# Patient Record
Sex: FEMALE | Race: WHITE | NOT HISPANIC OR LATINO
[De-identification: names, ages, dates, MRNs, and addresses within clinical notes are randomized per-mention and may not be internally consistent; named-entity substitution may affect disease eponyms.]

---

## 2017-07-20 ENCOUNTER — APPOINTMENT (OUTPATIENT)
Dept: BARIATRICS | Facility: CLINIC | Age: 62
End: 2017-07-20

## 2017-07-20 VITALS
DIASTOLIC BLOOD PRESSURE: 86 MMHG | SYSTOLIC BLOOD PRESSURE: 132 MMHG | WEIGHT: 198 LBS | HEIGHT: 64 IN | BODY MASS INDEX: 33.8 KG/M2

## 2017-07-20 RX ORDER — BUSPIRONE HYDROCHLORIDE 10 MG/1
10 TABLET ORAL
Qty: 60 | Refills: 0 | Status: COMPLETED | COMMUNITY
Start: 2017-04-12

## 2018-02-22 ENCOUNTER — OUTPATIENT (OUTPATIENT)
Dept: OUTPATIENT SERVICES | Facility: HOSPITAL | Age: 63
LOS: 1 days | End: 2018-02-22
Payer: COMMERCIAL

## 2018-02-22 ENCOUNTER — TRANSCRIPTION ENCOUNTER (OUTPATIENT)
Age: 63
End: 2018-02-22

## 2018-02-22 ENCOUNTER — APPOINTMENT (OUTPATIENT)
Dept: BARIATRICS | Facility: CLINIC | Age: 63
End: 2018-02-22
Payer: COMMERCIAL

## 2018-02-22 VITALS
BODY MASS INDEX: 33.46 KG/M2 | DIASTOLIC BLOOD PRESSURE: 100 MMHG | HEIGHT: 64 IN | OXYGEN SATURATION: 98 % | SYSTOLIC BLOOD PRESSURE: 150 MMHG | WEIGHT: 195.99 LBS | HEART RATE: 74 BPM

## 2018-02-22 DIAGNOSIS — E66.9 OBESITY, UNSPECIFIED: ICD-10-CM

## 2018-02-22 DIAGNOSIS — Z98.84 BARIATRIC SURGERY STATUS: ICD-10-CM

## 2018-02-22 PROCEDURE — 74220 X-RAY XM ESOPHAGUS 1CNTRST: CPT

## 2018-02-22 PROCEDURE — 74220 X-RAY XM ESOPHAGUS 1CNTRST: CPT | Mod: 26

## 2018-02-22 PROCEDURE — 99213 OFFICE O/P EST LOW 20 MIN: CPT

## 2018-02-22 RX ORDER — PAROXETINE HYDROCHLORIDE 20 MG/1
20 TABLET, FILM COATED ORAL
Qty: 30 | Refills: 0 | Status: COMPLETED | COMMUNITY
Start: 2017-01-31 | End: 2018-02-22

## 2018-09-13 ENCOUNTER — APPOINTMENT (OUTPATIENT)
Dept: BARIATRICS | Facility: CLINIC | Age: 63
End: 2018-09-13
Payer: COMMERCIAL

## 2018-09-13 VITALS
OXYGEN SATURATION: 99 % | HEART RATE: 70 BPM | SYSTOLIC BLOOD PRESSURE: 130 MMHG | DIASTOLIC BLOOD PRESSURE: 90 MMHG | WEIGHT: 201 LBS | BODY MASS INDEX: 34.31 KG/M2 | HEIGHT: 64 IN

## 2018-09-13 PROCEDURE — 99214 OFFICE O/P EST MOD 30 MIN: CPT

## 2019-01-16 ENCOUNTER — RX RENEWAL (OUTPATIENT)
Age: 64
End: 2019-01-16

## 2019-01-31 ENCOUNTER — APPOINTMENT (OUTPATIENT)
Dept: BARIATRICS | Facility: CLINIC | Age: 64
End: 2019-01-31
Payer: COMMERCIAL

## 2019-01-31 VITALS
HEIGHT: 64 IN | OXYGEN SATURATION: 98 % | HEART RATE: 82 BPM | WEIGHT: 208.33 LBS | BODY MASS INDEX: 35.57 KG/M2 | SYSTOLIC BLOOD PRESSURE: 118 MMHG | DIASTOLIC BLOOD PRESSURE: 80 MMHG

## 2019-01-31 PROCEDURE — 99214 OFFICE O/P EST MOD 30 MIN: CPT

## 2019-01-31 NOTE — REASON FOR VISIT
[Follow-Up Visit] : a follow-up visit for [Morbid Obesity (BMI<40)] : morbid obesity (bmi<40) [S/P Bariatric Surgery] : s/p bariatric surgery

## 2019-02-04 NOTE — ASSESSMENT
[FreeTextEntry1] : 63year old F s/p lap band surgery APS WITH GREATER CURVATURE PLICATION- here for follow up visit. Weight gain since last visit.  + hx of maladaptive eating patterns/ snacking grazing during day./ poor food choices. History of depression /anxiety. \par \par No Lap Band adjustment performed. \par \par Nutritional counseling has been provided. The patient is encouraged to remain calorie conscious and continue a low fat, low carbohydrate, protein focus diet. Pt encouraged to participate in a daily exercise regimen incorporating cardio and  strength training. Encouraged to keep a food journal.\par \par May consider attending  a nutrition support class. \par \par Continue to follow up regularly with private Psychiatrist. \par \par Return to office in 3 MONTHS or earlier if any concerns. \par

## 2019-02-04 NOTE — HISTORY OF PRESENT ILLNESS
[Procedure: ___] : Procedure performed: [unfilled]  [Date of Surgery: ___] : Date of Surgery:   [unfilled] [Surgeon Name:   ___] : Surgeon Name: Dr. BUSCH [Pre-Op Weight ___] : Pre-op weight was [unfilled] lbs [de-identified] : 63 year old F s/p lap band surgery APS WITH GREATER CURVATURE PLICATION- here for follow up visit. Weight gain since last visit. Pt continues to work on getting back  on track and admits she makes some poor food choices at times. - Snacking/ grazing between meals - skipping meals. due to being busy caring for 5 grandchildren. Currently seeing her private psychiatrist due to history of anxiety / depression. Stable on medication .Pt is aware she needs to work on consuming consistent protein focus meals and consuming a sufficient amount of zero calorie liquid per day.  Pt is aware she  needs to make better dietary choices at times. Refusing to follow up with nutritionist at this time - may consider attending a post op nutrition class. Denies any reflux symptoms. Mild constipation due to not consuming enough daily liquid during the day.. No nausea or vomiting. Pt indicated that she needs to increase daily  exercise incorporating cardio and strength training. Pt denies port site pain. Pt is not requesting a slight lap band adjustment today.\par

## 2019-02-04 NOTE — PHYSICAL EXAM
[Obese, well nourished, in no acute distress] : obese, well nourished, in no acute distress [Normal] : affect appropriate [de-identified] : normoactive bowel sounds, soft and non tender, no hepatosplenomegaly or masses appreciated.

## 2019-02-04 NOTE — REVIEW OF SYSTEMS
[Recent Change In Weight] : ~T recent weight change [Constipation] : constipation [Anxiety] : anxiety [Depression] : depression [Negative] : Heme/Lymph [Dysphagia] : no dysphagia [Chest Pain] : no chest pain [Palpitations] : no palpitations [Lower Ext Edema] : no lower extremity edema [Shortness Of Breath] : no shortness of breath [Abdominal Pain] : no abdominal pain [Vomiting] : no vomiting [Diarrhea] : no diarrhea [Reflux/Heartburn] : no reflux/ heartburn [Dysuria] : no dysuria [Incontinence] : no incontinence [Suicidal] : not suicidal [FreeTextEntry2] : weight gain  [FreeTextEntry7] : hx of intermittent constipation.  [de-identified] : history- stable on medication

## 2019-04-16 ENCOUNTER — TRANSCRIPTION ENCOUNTER (OUTPATIENT)
Age: 64
End: 2019-04-16

## 2019-04-16 ENCOUNTER — RX CHANGE (OUTPATIENT)
Age: 64
End: 2019-04-16

## 2019-05-14 ENCOUNTER — RX RENEWAL (OUTPATIENT)
Age: 64
End: 2019-05-14

## 2019-06-14 ENCOUNTER — RX RENEWAL (OUTPATIENT)
Age: 64
End: 2019-06-14

## 2019-07-25 ENCOUNTER — APPOINTMENT (OUTPATIENT)
Dept: INTERNAL MEDICINE | Facility: CLINIC | Age: 64
End: 2019-07-25
Payer: MEDICARE

## 2019-07-25 VITALS
WEIGHT: 203 LBS | SYSTOLIC BLOOD PRESSURE: 124 MMHG | RESPIRATION RATE: 16 BRPM | HEIGHT: 64 IN | BODY MASS INDEX: 34.66 KG/M2 | DIASTOLIC BLOOD PRESSURE: 82 MMHG

## 2019-07-25 PROCEDURE — 99213 OFFICE O/P EST LOW 20 MIN: CPT

## 2019-07-25 NOTE — HEALTH RISK ASSESSMENT
[Patient reported colonoscopy was normal] : Patient reported colonoscopy was normal [ColonoscopyDate] : 1/13 [ColonoscopyComments] : nj

## 2019-07-25 NOTE — HISTORY OF PRESENT ILLNESS
[FreeTextEntry1] : f/u anxiety [de-identified] : on effexor for anxiety (no chart) and depression--was seeing psyc--reviewed bariatric surgery notes--analilia 7 yrs ago.Saw nsx (jaylan) for hx meningioma--no progressive neuro symps

## 2019-08-08 ENCOUNTER — APPOINTMENT (OUTPATIENT)
Dept: BARIATRICS | Facility: CLINIC | Age: 64
End: 2019-08-08

## 2019-08-22 ENCOUNTER — APPOINTMENT (OUTPATIENT)
Dept: BARIATRICS | Facility: CLINIC | Age: 64
End: 2019-08-22
Payer: MEDICARE

## 2019-08-22 VITALS
BODY MASS INDEX: 36.17 KG/M2 | SYSTOLIC BLOOD PRESSURE: 120 MMHG | HEART RATE: 79 BPM | DIASTOLIC BLOOD PRESSURE: 80 MMHG | WEIGHT: 211.86 LBS | HEIGHT: 64 IN | OXYGEN SATURATION: 98 %

## 2019-08-22 PROCEDURE — 99214 OFFICE O/P EST MOD 30 MIN: CPT

## 2019-08-22 RX ORDER — BUSPIRONE HYDROCHLORIDE 15 MG/1
15 TABLET ORAL
Qty: 60 | Refills: 0 | Status: COMPLETED | COMMUNITY
Start: 2017-06-07 | End: 2019-08-22

## 2019-08-22 RX ORDER — VENLAFAXINE HYDROCHLORIDE 75 MG/1
75 CAPSULE, EXTENDED RELEASE ORAL
Qty: 90 | Refills: 0 | Status: COMPLETED | COMMUNITY
Start: 2017-06-07 | End: 2019-08-22

## 2019-08-27 NOTE — PHYSICAL EXAM
[Obese, well nourished, in no acute distress] : obese, well nourished, in no acute distress [Normal] : affect appropriate [de-identified] : normoactive bowel sounds, soft and non tender, no hepatosplenomegaly or masses appreciated.

## 2019-08-27 NOTE — HISTORY OF PRESENT ILLNESS
[Procedure: ___] : Procedure performed: [unfilled]  [Date of Surgery: ___] : Date of Surgery:   [unfilled] [Surgeon Name:   ___] : Surgeon Name: Dr. BUSCH [Pre-Op Weight ___] : Pre-op weight was [unfilled] lbs [de-identified] : 63 year old F s/p lap band surgery APS WITH GREATER CURVATURE PLICATION- here for follow up visit. Weight gain since last visit. Pt continues to work on getting back  on track and admits she makes some poor food choices at times. - Pt continues to work on making healthier food choices since last visit.Admits to snacking on cheese/ crackers / consuming 2-3 glasses of wine per night.  Pt states she is very busy caring for 5 grandchildren. Recently getting over gastroenteritis ~ 2 weeks. ago. Pain/ discomfort has fully resolved.  Currently seeing her private psychiatrist due to history of anxiety / depression.Pt is aware she needs to work on consuming consistent protein focus meals and consuming a sufficient amount of zero calorie liquid per day.  Pt is aware she needs to make better dietary choices at times. Refusing to follow up with nutritionist at this time - may consider attending a post op nutrition class. Denies any reflux symptoms. Mild constipation due to not consuming enough daily liquid during the day.. No nausea or vomiting. Pt indicated that she needs to increase daily  exercise incorporating cardio and strength training. Pt denies port site pain. Pt is not requesting a slight lap band adjustment today.\par

## 2019-08-27 NOTE — ASSESSMENT
[FreeTextEntry1] : 63year old F s/p lap band surgery APS WITH GREATER CURVATURE PLICATION- here for follow up visit. Weight gain since last visit.  + hx of maladaptive eating patterns/ snacking grazing during day./ poor food choices. History of depression /anxiety. \par \par No Lap Band adjustment performed. Aware she needs to make behavioral/ mechanical changes. \par \par Nutritional counseling has been provided. The patient is encouraged to remain calorie conscious and continue a low fat, low carbohydrate, protein focus diet. Pt encouraged to participate in a daily exercise regimen incorporating cardio and  strength training. Encouraged to keep a food journal.\par \par Plan to schedule follow up visit with Dr. Ledezma - psych - due to history of emotional / stress eating. \par \par Continue to follow up regularly with private Psychiatrist. \par \par Return to office in 6-8 weeks or earlier if any concerns. \par

## 2019-08-27 NOTE — REVIEW OF SYSTEMS
[Recent Change In Weight] : ~T recent weight change [Anxiety] : anxiety [Depression] : depression [Negative] : Heme/Lymph [Dysphagia] : dysphagia [Chest Pain] : no chest pain [Palpitations] : no palpitations [Lower Ext Edema] : no lower extremity edema [Shortness Of Breath] : no shortness of breath [Abdominal Pain] : no abdominal pain [Vomiting] : no vomiting [Constipation] : no constipation [Diarrhea] : no diarrhea [Reflux/Heartburn] : no reflux/ heartburn [Dysuria] : no dysuria [Incontinence] : no incontinence [Suicidal] : not suicidal [FreeTextEntry2] : weight gain  [FreeTextEntry4] : occasionally with solid foods if eating too fast.  [de-identified] : history- stable on medication

## 2019-09-20 ENCOUNTER — APPOINTMENT (OUTPATIENT)
Dept: BARIATRICS | Facility: CLINIC | Age: 64
End: 2019-09-20
Payer: MEDICARE

## 2019-09-20 ENCOUNTER — OUTPATIENT (OUTPATIENT)
Dept: OUTPATIENT SERVICES | Facility: HOSPITAL | Age: 64
LOS: 1 days | End: 2019-09-20
Payer: MEDICARE

## 2019-09-20 VITALS
OXYGEN SATURATION: 98 % | HEIGHT: 64 IN | SYSTOLIC BLOOD PRESSURE: 118 MMHG | HEART RATE: 71 BPM | DIASTOLIC BLOOD PRESSURE: 74 MMHG | BODY MASS INDEX: 35.64 KG/M2 | WEIGHT: 208.77 LBS

## 2019-09-20 DIAGNOSIS — R63.4 ABNORMAL WEIGHT LOSS: ICD-10-CM

## 2019-09-20 DIAGNOSIS — E66.9 OBESITY, UNSPECIFIED: ICD-10-CM

## 2019-09-20 DIAGNOSIS — Z98.84 BARIATRIC SURGERY STATUS: ICD-10-CM

## 2019-09-20 DIAGNOSIS — Z72.89 OTHER PROBLEMS RELATED TO LIFESTYLE: ICD-10-CM

## 2019-09-20 PROCEDURE — 99214 OFFICE O/P EST MOD 30 MIN: CPT

## 2019-09-20 PROCEDURE — 74220 X-RAY XM ESOPHAGUS 1CNTRST: CPT

## 2019-09-20 PROCEDURE — 74220 X-RAY XM ESOPHAGUS 1CNTRST: CPT | Mod: 26

## 2019-09-23 NOTE — PHYSICAL EXAM
[Obese, well nourished, in no acute distress] : obese, well nourished, in no acute distress [Normal] : affect appropriate [de-identified] : normoactive bowel sounds, soft and non tender, no hepatosplenomegaly or masses appreciated.

## 2019-09-23 NOTE — ASSESSMENT
[FreeTextEntry1] : 63 year old F s/p lap band surgery APS WITH GREATER CURVATURE PLICATION- here for follow up visit. Weight loss since last visit. History of maladaptive eating patterns/ snacking grazing during day./ poor food choices. History of depression /anxiety. \par \par Pt saw nutritionist today - Amanda GARCES discussed and reviewed nutritional guidelines and weight loss strategies.\par \par No Lap Band adjustment performed. Discussed with patient that she will continue to work on making behavioral/ mechanical change prior to making lap band tighter/ will continue to work on eating slower.  \par \par Nutritional counseling has been provided. The patient is encouraged to remain calorie conscious and continue a low fat, low carbohydrate, protein focus diet. Pt encouraged to participate in a daily exercise regimen incorporating cardio. Pt is aware she needs to start some strength training. Encouraged to keep a food journal.\par \par Pt scheduled visit with Dr. Ledezma  - psych - in October 2019. due to history of emotional / stress eating. \par \par Return to office in 6-8 weeks or earlier if any concerns. \par

## 2019-09-23 NOTE — DATA REVIEWED
[FreeTextEntry1] :   PROCEDURE DATE: 09/20/2019     INTERPRETATION: Clinical information: Morbid obesity, bariatric surgery  status    view demonstrates lap band left upper quadrant, lap band port, left  lower quadrant. This exam was video recorded. Surgical clips present right  upper quadrant.   Under fluoroscopic observation the patient ingested barium. There was no  evidence of obstruction. Barium flowed through the esophagus, passed the lap  band, and filled the stomach.   There was no evidence of prolapse.   IMPRESSION: No evidence of prolapse. No evidence of obstruction.          MARCO A MONTEIRO M.D.,ATTENDING RADIOLOGIST  This document has been electronically signed. Sep 20 2019 10:00AM

## 2019-09-23 NOTE — HISTORY OF PRESENT ILLNESS
[Procedure: ___] : Procedure performed: [unfilled]  [Date of Surgery: ___] : Date of Surgery:   [unfilled] [Surgeon Name:   ___] : Surgeon Name: Dr. BUSCH [Pre-Op Weight ___] : Pre-op weight was [unfilled] lbs [de-identified] : 63 year old F s/p lap band surgery APS WITH GREATER CURVATURE PLICATION- here for follow up visit. Weight loss since last visit. Pt states she is making healthier food choices  since last visit.Pt is not consuming any ETOH since last visit.  Pt states she is very busy caring for 5 grandchildren. Recently getting over gastroenteritis ~ 2 weeks. ago. Pain/ discomfort has fully resolved. Patient continues to see her private psychiatrist due to history of anxiety / depression.Pt is aware she needs to work on consuming consistent protein focus meals and consuming a sufficient amount of zero calorie liquid per day. Requesting to see nutritionist today to discuss and review nutritional guidelines and weight loss strategies. Denies any reflux symptoms. Mild constipation due to not consuming enough daily liquid during the day.. No nausea or vomiting. Pt indicated that she needs to increase daily  exercise incorporating cardio and strength training. Pt denies port site pain. Pt would like to discuss whether she would benefit from having a slight lap band adjustment today.\par \par Video Esophagram performed today - No obstruction No prolapse noted. No pouch dilatation. Unremarkable video esophagram. \par

## 2019-09-23 NOTE — REVIEW OF SYSTEMS
[Recent Change In Weight] : ~T recent weight change [Anxiety] : anxiety [Depression] : depression [Negative] : Heme/Lymph [Dysphagia] : no dysphagia [Chest Pain] : no chest pain [Palpitations] : no palpitations [Lower Ext Edema] : no lower extremity edema [Shortness Of Breath] : no shortness of breath [Vomiting] : no vomiting [Abdominal Pain] : no abdominal pain [Constipation] : no constipation [Reflux/Heartburn] : no reflux/ heartburn [Diarrhea] : no diarrhea [Dysuria] : no dysuria [Suicidal] : not suicidal [Incontinence] : no incontinence [FreeTextEntry2] : weight loss  [FreeTextEntry4] : continues to work on eating slower.  [de-identified] : history- stable on medication

## 2019-10-07 ENCOUNTER — TRANSCRIPTION ENCOUNTER (OUTPATIENT)
Age: 64
End: 2019-10-07

## 2019-10-10 ENCOUNTER — APPOINTMENT (OUTPATIENT)
Dept: INTERNAL MEDICINE | Facility: CLINIC | Age: 64
End: 2019-10-10

## 2019-10-17 ENCOUNTER — APPOINTMENT (OUTPATIENT)
Dept: BARIATRICS/WEIGHT MGMT | Facility: CLINIC | Age: 64
End: 2019-10-17

## 2019-10-30 ENCOUNTER — APPOINTMENT (OUTPATIENT)
Dept: NEUROSURGERY | Facility: CLINIC | Age: 64
End: 2019-10-30
Payer: MEDICARE

## 2019-10-30 PROCEDURE — 99202 OFFICE O/P NEW SF 15 MIN: CPT

## 2019-11-01 ENCOUNTER — APPOINTMENT (OUTPATIENT)
Dept: BARIATRICS | Facility: CLINIC | Age: 64
End: 2019-11-01

## 2020-02-03 ENCOUNTER — RX RENEWAL (OUTPATIENT)
Age: 65
End: 2020-02-03

## 2020-03-25 ENCOUNTER — APPOINTMENT (OUTPATIENT)
Dept: INTERNAL MEDICINE | Facility: CLINIC | Age: 65
End: 2020-03-25
Payer: MEDICARE

## 2020-03-25 VITALS
WEIGHT: 211 LBS | HEART RATE: 80 BPM | TEMPERATURE: 99.2 F | BODY MASS INDEX: 36.02 KG/M2 | HEIGHT: 64 IN | SYSTOLIC BLOOD PRESSURE: 110 MMHG | DIASTOLIC BLOOD PRESSURE: 60 MMHG | OXYGEN SATURATION: 99 %

## 2020-03-25 DIAGNOSIS — Z87.440 PERSONAL HISTORY OF URINARY (TRACT) INFECTIONS: ICD-10-CM

## 2020-03-25 LAB
BILIRUB UR QL STRIP: NORMAL
CLARITY UR: NORMAL
COLLECTION METHOD: NORMAL
GLUCOSE UR-MCNC: NEGATIVE
HCG UR QL: 0.2 EU/DL
HGB UR QL STRIP.AUTO: NORMAL
KETONES UR-MCNC: NEGATIVE
LEUKOCYTE ESTERASE UR QL STRIP: NORMAL
NITRITE UR QL STRIP: POSITIVE
PH UR STRIP: 6
PROT UR STRIP-MCNC: NORMAL
SP GR UR STRIP: 1.02

## 2020-03-25 PROCEDURE — 99213 OFFICE O/P EST LOW 20 MIN: CPT | Mod: 25

## 2020-03-25 PROCEDURE — G0444 DEPRESSION SCREEN ANNUAL: CPT | Mod: 59

## 2020-03-25 PROCEDURE — 81002 URINALYSIS NONAUTO W/O SCOPE: CPT

## 2020-03-25 NOTE — HISTORY OF PRESENT ILLNESS
[FreeTextEntry8] : developed lower abd discomfort on right with blood tinged urine and dysuria for 24hrs--no fever or urinary retention\par No GI or resp symps\par Saw Dr. Sherman (nsx) to have possible meningioma excision in next few months\par stable on effexor

## 2020-03-25 NOTE — PHYSICAL EXAM
[Normal] : normal rate, regular rhythm, normal S1 and S2 and no murmur heard [de-identified] : mild right lower abd pain--no rebound--no cvat

## 2020-03-25 NOTE — ASSESSMENT
[FreeTextEntry1] : UA dip\par cipro 500mg bid for 5 days\par pt to contact provider if persistent hematuria, urinary symps after abx finished

## 2020-03-28 ENCOUNTER — TRANSCRIPTION ENCOUNTER (OUTPATIENT)
Age: 65
End: 2020-03-28

## 2020-03-28 LAB — BACTERIA UR CULT: ABNORMAL

## 2020-06-02 ENCOUNTER — RX RENEWAL (OUTPATIENT)
Age: 65
End: 2020-06-02

## 2020-06-10 ENCOUNTER — OUTPATIENT (OUTPATIENT)
Dept: OUTPATIENT SERVICES | Facility: HOSPITAL | Age: 65
LOS: 1 days | End: 2020-06-10
Payer: MEDICARE

## 2020-06-10 ENCOUNTER — APPOINTMENT (OUTPATIENT)
Dept: NEUROSURGERY | Facility: CLINIC | Age: 65
End: 2020-06-10
Payer: MEDICARE

## 2020-06-10 ENCOUNTER — APPOINTMENT (OUTPATIENT)
Dept: MRI IMAGING | Facility: CLINIC | Age: 65
End: 2020-06-10
Payer: MEDICARE

## 2020-06-10 DIAGNOSIS — D32.9 BENIGN NEOPLASM OF MENINGES, UNSPECIFIED: ICD-10-CM

## 2020-06-10 PROCEDURE — 70553 MRI BRAIN STEM W/O & W/DYE: CPT | Mod: 26

## 2020-06-10 PROCEDURE — 70553 MRI BRAIN STEM W/O & W/DYE: CPT

## 2020-06-10 PROCEDURE — A9585: CPT

## 2020-06-10 PROCEDURE — 99213 OFFICE O/P EST LOW 20 MIN: CPT

## 2020-06-10 NOTE — PHYSICAL EXAM
[General Appearance - In No Acute Distress] : in no acute distress [General Appearance - Alert] : alert [Oriented To Time, Place, And Person] : oriented to person, place, and time [Impaired Insight] : insight and judgment were intact [Affect] : the affect was normal [Person] : oriented to person [Place] : oriented to place [Short Term Intact] : short term memory intact [Time] : oriented to time [Span Intact] : the attention span was normal [Remote Intact] : remote memory intact [Concentration Intact] : normal concentrating ability [Comprehension] : comprehension intact [Fluency] : fluency intact [Vocabulary] : adequate range of vocabulary [Past History] : adequate knowledge of personal past history [Current Events] : adequate knowledge of current events [Cranial Nerves Optic (II)] : visual acuity intact bilaterally,  pupils equal round and reactive to light [Cranial Nerves Oculomotor (III)] : extraocular motion intact [Cranial Nerves Trigeminal (V)] : facial sensation intact symmetrically [Cranial Nerves Facial (VII)] : face symmetrical [Cranial Nerves Vestibulocochlear (VIII)] : hearing was intact bilaterally [Cranial Nerves Accessory (XI - Cranial And Spinal)] : head turning and shoulder shrug symmetric [Cranial Nerves Glossopharyngeal (IX)] : tongue and palate midline [Cranial Nerves Hypoglossal (XII)] : there was no tongue deviation with protrusion [Motor Tone] : muscle tone was normal in all four extremities [Motor Strength] : muscle strength was normal in all four extremities [No Muscle Atrophy] : normal bulk in all four extremities [Sensation Tactile Decrease] : light touch was intact [Abnormal Walk] : normal gait [Balance] : balance was intact [2+] : Patella left 2+ [Past-pointing] : there was no past-pointing [Tremor] : no tremor present

## 2020-06-10 NOTE — REASON FOR VISIT
[Follow-Up: _____] : a [unfilled] follow-up visit [Spouse] : spouse [FreeTextEntry1] : Patient comes to discusses treatment options for enlarging right parasagittal parietal meningioma.\par \par Patient fell in 2011, had a CT and MRI of her brain which showed a small right parasagittal parietal dural lesion. She has been followed since then. Lately the lesion markedly enlarged. The patient denies HA, vertigo, left sided sensory or motor dysfunction. No seizures, no visual dysfunction. She noted some forgetfulness, no other cognitive problems. \par \par The patient suffers from bipolar disorder and takes efexor; no other medication.

## 2020-06-10 NOTE — ASSESSMENT
[FreeTextEntry1] : - asymptomatic right parietal meningoma, no adjacent brain edema. Despite growth of the lesion between 2011 and 09/19, there has been barely any change in size between 09/19 and today's MRI. For this reason we recommend further observation, MRI w/o contrast in 12 month.

## 2020-10-27 ENCOUNTER — NON-APPOINTMENT (OUTPATIENT)
Age: 65
End: 2020-10-27

## 2020-10-27 ENCOUNTER — APPOINTMENT (OUTPATIENT)
Dept: INTERNAL MEDICINE | Facility: CLINIC | Age: 65
End: 2020-10-27
Payer: MEDICARE

## 2020-10-27 VITALS
WEIGHT: 215 LBS | RESPIRATION RATE: 16 BRPM | BODY MASS INDEX: 36.7 KG/M2 | OXYGEN SATURATION: 97 % | HEART RATE: 80 BPM | HEIGHT: 64 IN | TEMPERATURE: 98.5 F

## 2020-10-27 VITALS — SYSTOLIC BLOOD PRESSURE: 128 MMHG | DIASTOLIC BLOOD PRESSURE: 78 MMHG

## 2020-10-27 PROCEDURE — G0439: CPT

## 2020-10-27 PROCEDURE — 36415 COLL VENOUS BLD VENIPUNCTURE: CPT

## 2020-10-27 PROCEDURE — 93000 ELECTROCARDIOGRAM COMPLETE: CPT | Mod: 59

## 2020-10-27 PROCEDURE — G0444 DEPRESSION SCREEN ANNUAL: CPT | Mod: 59

## 2020-10-27 NOTE — ASSESSMENT
[FreeTextEntry1] : pt to contact her gastro about colon\par mammo\par gyn f/u\par labs\par ecg\par wt loss\par

## 2020-10-27 NOTE — HISTORY OF PRESENT ILLNESS
[FreeTextEntry1] : cpx [de-identified] : cpx\par reviewed nsx notes\par no new cardiac symps--hx LBBB\par on stable dose effexor

## 2020-10-27 NOTE — HEALTH RISK ASSESSMENT
[With Significant Other] : lives with significant other [] :  [Fully functional (bathing, dressing, toileting, transferring, walking, feeding)] : Fully functional (bathing, dressing, toileting, transferring, walking, feeding) [Fully functional (using the telephone, shopping, preparing meals, housekeeping, doing laundry, using] : Fully functional and needs no help or supervision to perform IADLs (using the telephone, shopping, preparing meals, housekeeping, doing laundry, using transportation, managing medications and managing finances) [MammogramDate] : 8/19

## 2020-10-28 ENCOUNTER — TRANSCRIPTION ENCOUNTER (OUTPATIENT)
Age: 65
End: 2020-10-28

## 2020-10-28 ENCOUNTER — NON-APPOINTMENT (OUTPATIENT)
Age: 65
End: 2020-10-28

## 2020-10-28 LAB
ALBUMIN SERPL ELPH-MCNC: 5 G/DL
ALP BLD-CCNC: 57 U/L
ALT SERPL-CCNC: 31 U/L
ANION GAP SERPL CALC-SCNC: 12 MMOL/L
APPEARANCE: CLEAR
AST SERPL-CCNC: 24 U/L
BACTERIA: NEGATIVE
BASOPHILS # BLD AUTO: 0.05 K/UL
BASOPHILS NFR BLD AUTO: 0.8 %
BILIRUB SERPL-MCNC: 0.4 MG/DL
BILIRUBIN URINE: NEGATIVE
BLOOD URINE: NORMAL
BUN SERPL-MCNC: 17 MG/DL
CALCIUM SERPL-MCNC: 9.1 MG/DL
CHLORIDE SERPL-SCNC: 100 MMOL/L
CHOLEST SERPL-MCNC: 289 MG/DL
CO2 SERPL-SCNC: 26 MMOL/L
COLOR: YELLOW
CREAT SERPL-MCNC: 0.68 MG/DL
EOSINOPHIL # BLD AUTO: 0.11 K/UL
EOSINOPHIL NFR BLD AUTO: 1.8 %
ESTIMATED AVERAGE GLUCOSE: 120 MG/DL
GLUCOSE QUALITATIVE U: NEGATIVE
GLUCOSE SERPL-MCNC: 100 MG/DL
HBA1C MFR BLD HPLC: 5.8 %
HCT VFR BLD CALC: 43.6 %
HDLC SERPL-MCNC: 77 MG/DL
HGB BLD-MCNC: 14.2 G/DL
HYALINE CASTS: 1 /LPF
IMM GRANULOCYTES NFR BLD AUTO: 0.3 %
KETONES URINE: NEGATIVE
LDLC SERPL CALC-MCNC: 178 MG/DL
LEUKOCYTE ESTERASE URINE: NEGATIVE
LYMPHOCYTES # BLD AUTO: 2.38 K/UL
LYMPHOCYTES NFR BLD AUTO: 38.4 %
MAN DIFF?: NORMAL
MCHC RBC-ENTMCNC: 29.4 PG
MCHC RBC-ENTMCNC: 32.6 GM/DL
MCV RBC AUTO: 90.3 FL
MICROSCOPIC-UA: NORMAL
MONOCYTES # BLD AUTO: 0.55 K/UL
MONOCYTES NFR BLD AUTO: 8.9 %
NEUTROPHILS # BLD AUTO: 3.09 K/UL
NEUTROPHILS NFR BLD AUTO: 49.8 %
NITRITE URINE: NEGATIVE
NONHDLC SERPL-MCNC: 212 MG/DL
PH URINE: 5.5
PLATELET # BLD AUTO: 218 K/UL
POTASSIUM SERPL-SCNC: 4.4 MMOL/L
PROT SERPL-MCNC: 7.1 G/DL
PROTEIN URINE: NORMAL
RBC # BLD: 4.83 M/UL
RBC # FLD: 13 %
RED BLOOD CELLS URINE: 3 /HPF
SODIUM SERPL-SCNC: 139 MMOL/L
SPECIFIC GRAVITY URINE: 1.03
SQUAMOUS EPITHELIAL CELLS: 5 /HPF
TRIGL SERPL-MCNC: 170 MG/DL
TSH SERPL-ACNC: 1.34 UIU/ML
UROBILINOGEN URINE: NORMAL
WBC # FLD AUTO: 6.2 K/UL
WHITE BLOOD CELLS URINE: 4 /HPF

## 2020-11-05 ENCOUNTER — RX RENEWAL (OUTPATIENT)
Age: 65
End: 2020-11-05

## 2020-12-23 PROBLEM — Z87.440 HISTORY OF URINARY TRACT INFECTION: Status: RESOLVED | Noted: 2020-03-25 | Resolved: 2020-12-23

## 2021-02-22 ENCOUNTER — APPOINTMENT (OUTPATIENT)
Dept: OBGYN | Facility: CLINIC | Age: 66
End: 2021-02-22
Payer: MEDICARE

## 2021-02-22 VITALS
WEIGHT: 220 LBS | DIASTOLIC BLOOD PRESSURE: 108 MMHG | SYSTOLIC BLOOD PRESSURE: 177 MMHG | BODY MASS INDEX: 37.56 KG/M2 | HEIGHT: 64 IN

## 2021-02-22 PROCEDURE — G0101: CPT

## 2021-02-22 PROCEDURE — 99386 PREV VISIT NEW AGE 40-64: CPT | Mod: GY

## 2021-02-22 NOTE — DISCUSSION/SUMMARY
[FreeTextEntry1] : 65 yr old new patient had a lapband and gained weight again and  watches her grandchildren and then stays up late at night and told her not good for health and should get enough sleep. Referral for mammography and beast sonogram. She  has not has a GYN exam for 10 yr. has 2 children  male 1981 and female in 1982 both vaginal ; Sent Estrace to pharmacy and explained how to take. her B/P was 177/108.  We were talking a lot and did not realize her high B/P and called her cell and home and left message to call me as want to put her on  Altace. I will keep calling her. her Dr took her off meds after she lost a lot of weight but gained again. got a hold of patient and told her high blood pressure and sent altace 2..5 to pharmacy.

## 2021-02-22 NOTE — PHYSICAL EXAM
[Appropriately responsive] : appropriately responsive [Alert] : alert [No Acute Distress] : no acute distress [No Lymphadenopathy] : no lymphadenopathy [Regular Rate Rhythm] : regular rate rhythm [No Murmurs] : no murmurs [Clear to Auscultation B/L] : clear to auscultation bilaterally [Oriented x3] : oriented x3 [Examination Of The Breasts] : a normal appearance [No Masses] : no breast masses were palpable [Labia Majora] : normal [Normal] : normal [FreeTextEntry3] : no thyromegaly [FreeTextEntry7] : large and gained weight afttg lap band [FreeTextEntry5] : non tender PAP done [FreeTextEntry4] : she feels dry and painful sex [FreeTextEntry6] : no uterine or adnexal masses [FreeTextEntry8] : nl bimanual

## 2021-02-26 LAB — CYTOLOGY CVX/VAG DOC THIN PREP: ABNORMAL

## 2021-05-05 ENCOUNTER — RX RENEWAL (OUTPATIENT)
Age: 66
End: 2021-05-05

## 2021-06-02 ENCOUNTER — RX RENEWAL (OUTPATIENT)
Age: 66
End: 2021-06-02

## 2021-06-09 ENCOUNTER — OUTPATIENT (OUTPATIENT)
Dept: OUTPATIENT SERVICES | Facility: HOSPITAL | Age: 66
LOS: 1 days | End: 2021-06-09
Payer: MEDICARE

## 2021-06-09 ENCOUNTER — APPOINTMENT (OUTPATIENT)
Dept: MRI IMAGING | Facility: IMAGING CENTER | Age: 66
End: 2021-06-09
Payer: MEDICARE

## 2021-06-09 ENCOUNTER — APPOINTMENT (OUTPATIENT)
Dept: NEUROSURGERY | Facility: CLINIC | Age: 66
End: 2021-06-09
Payer: MEDICARE

## 2021-06-09 DIAGNOSIS — D32.9 BENIGN NEOPLASM OF MENINGES, UNSPECIFIED: ICD-10-CM

## 2021-06-09 PROCEDURE — 70551 MRI BRAIN STEM W/O DYE: CPT | Mod: 26,MH

## 2021-06-09 PROCEDURE — 70551 MRI BRAIN STEM W/O DYE: CPT

## 2021-06-09 PROCEDURE — 99213 OFFICE O/P EST LOW 20 MIN: CPT

## 2021-06-15 VITALS
DIASTOLIC BLOOD PRESSURE: 88 MMHG | OXYGEN SATURATION: 99 % | HEART RATE: 75 BPM | RESPIRATION RATE: 16 BRPM | SYSTOLIC BLOOD PRESSURE: 138 MMHG

## 2021-06-15 NOTE — PHYSICAL EXAM
[General Appearance - Alert] : alert [General Appearance - In No Acute Distress] : in no acute distress [Oriented To Time, Place, And Person] : oriented to person, place, and time [Impaired Insight] : insight and judgment were intact [Affect] : the affect was normal [Person] : oriented to person [Place] : oriented to place [Time] : oriented to time [Short Term Intact] : short term memory intact [Remote Intact] : remote memory intact [Span Intact] : the attention span was normal [Concentration Intact] : normal concentrating ability [Fluency] : fluency intact [Comprehension] : comprehension intact [Current Events] : adequate knowledge of current events [Past History] : adequate knowledge of personal past history [Vocabulary] : adequate range of vocabulary [Cranial Nerves Oculomotor (III)] : extraocular motion intact [Cranial Nerves Optic (II)] : visual acuity intact bilaterally,  pupils equal round and reactive to light [Cranial Nerves Trigeminal (V)] : facial sensation intact symmetrically [Cranial Nerves Facial (VII)] : face symmetrical [Cranial Nerves Vestibulocochlear (VIII)] : hearing was intact bilaterally [Cranial Nerves Glossopharyngeal (IX)] : tongue and palate midline [Cranial Nerves Accessory (XI - Cranial And Spinal)] : head turning and shoulder shrug symmetric [Cranial Nerves Hypoglossal (XII)] : there was no tongue deviation with protrusion [Motor Strength] : muscle strength was normal in all four extremities [No Muscle Atrophy] : normal bulk in all four extremities [Sensation Tactile Decrease] : light touch was intact [Balance] : balance was intact [Past-pointing] : there was no past-pointing [Tremor] : no tremor present [2+] : Patella left 2+ [Sclera] : the sclera and conjunctiva were normal [PERRL With Normal Accommodation] : pupils were equal in size, round, reactive to light, with normal accommodation [Extraocular Movements] : extraocular movements were intact [Outer Ear] : the ears and nose were normal in appearance [Oropharynx] : the oropharynx was normal [Abnormal Walk] : normal gait [Nail Clubbing] : no clubbing  or cyanosis of the fingernails [Musculoskeletal - Swelling] : no joint swelling seen [Motor Tone] : muscle strength and tone were normal [Skin Color & Pigmentation] : normal skin color and pigmentation [Skin Turgor] : normal skin turgor [] : no rash

## 2021-06-15 NOTE — REASON FOR VISIT
[Follow-Up: _____] : a [unfilled] follow-up visit [Family Member] : family member [FreeTextEntry1] : Patient here for follow-up for meningioma and MRI review.\par Patient without new complaints. She continues to have frontal sinus pressure headaches. she also reports mild short term memory difficulties. No weakness, seizures noted.

## 2021-06-15 NOTE — DATA REVIEWED
[de-identified] : EXAM: MR BRAIN\par \par \par PROCEDURE DATE: 06/09/2021\par \par \par \par INTERPRETATION: Non-contrast MRI of the brain.\par \par CLINICAL INDICATION: Follow-up for meningioma\par \par TECHNIQUE: Multiplanar, multisequence MR images of the brain were obtained without the administration of intravenous contrast.\par \par COMPARISON: Brain MRI dated 6/10/2020\par \par FINDINGS:Redemonstrated is a right parietal extra-axial mass measuring 3.3 cm AP by 3.1 cm transverse by 1.9 cm cc, not significantly changed in appearance or size when compared with the prior examination.\par \par No hydrocephalus, midline shift or extra-axial collections are present.\par \par Major flow-voids at the skull base are within normal limits.\par \par The orbits are not remarkable in appearance.\par \par The visualized paranasal sinuses and tympanomastoid cavities are free of acute disease. A small left maxillary sinus polyp versus retention cyst is noted.\par \par IMPRESSION:\par \par Unchanged right parietal meningioma.

## 2021-06-15 NOTE — ASSESSMENT
[FreeTextEntry1] : Discussion:\par - Reviewed MRI results. Stable exam. No growth noted. No cerebral edema.\par - Patient remains asymptomatic.\par - No neurosurgical intervention is recommended.\par - Continue to follow with yearly MRI no contract and OV.

## 2021-06-15 NOTE — HISTORY OF PRESENT ILLNESS
[FreeTextEntry1] : Incidental finding of right parasagittal meningioma in 2011.\par Monitoring for growth with MRI. \par Patient has been asymptomatic.

## 2021-09-07 ENCOUNTER — NON-APPOINTMENT (OUTPATIENT)
Age: 66
End: 2021-09-07

## 2021-09-07 ENCOUNTER — RX RENEWAL (OUTPATIENT)
Age: 66
End: 2021-09-07

## 2021-11-16 ENCOUNTER — APPOINTMENT (OUTPATIENT)
Dept: INTERNAL MEDICINE | Facility: CLINIC | Age: 66
End: 2021-11-16
Payer: MEDICARE

## 2021-11-16 ENCOUNTER — NON-APPOINTMENT (OUTPATIENT)
Age: 66
End: 2021-11-16

## 2021-11-16 VITALS
BODY MASS INDEX: 37.9 KG/M2 | HEIGHT: 64 IN | WEIGHT: 222 LBS | HEART RATE: 75 BPM | TEMPERATURE: 98.3 F | OXYGEN SATURATION: 97 %

## 2021-11-16 VITALS — DIASTOLIC BLOOD PRESSURE: 74 MMHG | SYSTOLIC BLOOD PRESSURE: 132 MMHG

## 2021-11-16 DIAGNOSIS — I44.7 LEFT BUNDLE-BRANCH BLOCK, UNSPECIFIED: ICD-10-CM

## 2021-11-16 PROCEDURE — 93000 ELECTROCARDIOGRAM COMPLETE: CPT

## 2021-11-16 PROCEDURE — 36415 COLL VENOUS BLD VENIPUNCTURE: CPT

## 2021-11-16 PROCEDURE — G0439: CPT

## 2021-11-16 NOTE — HISTORY OF PRESENT ILLNESS
[FreeTextEntry1] : cpx [de-identified] : cpx\par wt gain noted\par started on altace 2.5mg by gyn in 2/21 for elevated bp ---she admits to missing doses\par pt is covid vaccinated

## 2021-11-17 ENCOUNTER — NON-APPOINTMENT (OUTPATIENT)
Age: 66
End: 2021-11-17

## 2021-11-17 LAB
ALBUMIN SERPL ELPH-MCNC: 4.7 G/DL
ALP BLD-CCNC: 48 U/L
ALT SERPL-CCNC: 35 U/L
ANION GAP SERPL CALC-SCNC: 15 MMOL/L
APPEARANCE: CLEAR
AST SERPL-CCNC: 26 U/L
BACTERIA: ABNORMAL
BASOPHILS # BLD AUTO: 0.03 K/UL
BASOPHILS NFR BLD AUTO: 0.5 %
BILIRUB SERPL-MCNC: 0.2 MG/DL
BILIRUBIN URINE: NEGATIVE
BLOOD URINE: ABNORMAL
BUN SERPL-MCNC: 17 MG/DL
CALCIUM SERPL-MCNC: 9.7 MG/DL
CHLORIDE SERPL-SCNC: 102 MMOL/L
CHOLEST SERPL-MCNC: 293 MG/DL
CO2 SERPL-SCNC: 24 MMOL/L
COLOR: NORMAL
CREAT SERPL-MCNC: 0.8 MG/DL
EOSINOPHIL # BLD AUTO: 0.14 K/UL
EOSINOPHIL NFR BLD AUTO: 2.5 %
ESTIMATED AVERAGE GLUCOSE: 128 MG/DL
GLUCOSE QUALITATIVE U: NEGATIVE
GLUCOSE SERPL-MCNC: 122 MG/DL
HBA1C MFR BLD HPLC: 6.1 %
HCT VFR BLD CALC: 44.6 %
HDLC SERPL-MCNC: 67 MG/DL
HGB BLD-MCNC: 14.1 G/DL
HYALINE CASTS: 0 /LPF
IMM GRANULOCYTES NFR BLD AUTO: 0.2 %
KETONES URINE: NEGATIVE
LDLC SERPL CALC-MCNC: 152 MG/DL
LDLC SERPL DIRECT ASSAY-MCNC: 176 MG/DL
LEUKOCYTE ESTERASE URINE: ABNORMAL
LYMPHOCYTES # BLD AUTO: 1.91 K/UL
LYMPHOCYTES NFR BLD AUTO: 34.7 %
MAN DIFF?: NORMAL
MCHC RBC-ENTMCNC: 29.6 PG
MCHC RBC-ENTMCNC: 31.6 GM/DL
MCV RBC AUTO: 93.5 FL
MICROSCOPIC-UA: NORMAL
MONOCYTES # BLD AUTO: 0.48 K/UL
MONOCYTES NFR BLD AUTO: 8.7 %
NEUTROPHILS # BLD AUTO: 2.93 K/UL
NEUTROPHILS NFR BLD AUTO: 53.4 %
NITRITE URINE: NEGATIVE
NONHDLC SERPL-MCNC: 227 MG/DL
PH URINE: 6
PLATELET # BLD AUTO: 208 K/UL
POTASSIUM SERPL-SCNC: 4.8 MMOL/L
PROT SERPL-MCNC: 6.9 G/DL
PROTEIN URINE: NEGATIVE
RBC # BLD: 4.77 M/UL
RBC # FLD: 13.1 %
RED BLOOD CELLS URINE: 3 /HPF
SODIUM SERPL-SCNC: 141 MMOL/L
SPECIFIC GRAVITY URINE: 1.02
SQUAMOUS EPITHELIAL CELLS: 2 /HPF
TRIGL SERPL-MCNC: 374 MG/DL
TSH SERPL-ACNC: 2.12 UIU/ML
UROBILINOGEN URINE: NORMAL
WBC # FLD AUTO: 5.5 K/UL
WHITE BLOOD CELLS URINE: 24 /HPF

## 2021-11-23 ENCOUNTER — RX RENEWAL (OUTPATIENT)
Age: 66
End: 2021-11-23

## 2022-05-18 ENCOUNTER — RX RENEWAL (OUTPATIENT)
Age: 67
End: 2022-05-18

## 2022-06-13 ENCOUNTER — OUTPATIENT (OUTPATIENT)
Dept: OUTPATIENT SERVICES | Facility: HOSPITAL | Age: 67
LOS: 1 days | End: 2022-06-13
Payer: MEDICARE

## 2022-06-13 ENCOUNTER — APPOINTMENT (OUTPATIENT)
Dept: NEUROSURGERY | Facility: CLINIC | Age: 67
End: 2022-06-13
Payer: MEDICARE

## 2022-06-13 ENCOUNTER — APPOINTMENT (OUTPATIENT)
Dept: MRI IMAGING | Facility: IMAGING CENTER | Age: 67
End: 2022-06-13
Payer: MEDICARE

## 2022-06-13 DIAGNOSIS — D32.9 BENIGN NEOPLASM OF MENINGES, UNSPECIFIED: ICD-10-CM

## 2022-06-13 PROCEDURE — 70551 MRI BRAIN STEM W/O DYE: CPT | Mod: 26,MH

## 2022-06-13 PROCEDURE — 99213 OFFICE O/P EST LOW 20 MIN: CPT

## 2022-06-13 PROCEDURE — 70551 MRI BRAIN STEM W/O DYE: CPT

## 2022-06-13 NOTE — REASON FOR VISIT
[Follow-Up: _____] : a [unfilled] follow-up visit [Spouse] : spouse [FreeTextEntry1] : Annual follow up visit with yearly MRI brain non con done 6/13/2022

## 2022-06-13 NOTE — ASSESSMENT
[FreeTextEntry1] : IMPRESSION: 66F with right parasagittal parietal meningioma\par \par Annual MRI brain non con done 6/13 shows no interval change in the size of the RIGHT parietal convexity meningioma compared to MRI from 2021. It currently measures 2.7 cm AP by 3.5 cm TRV by 2.0 cm CC. Minimal mucosal thickening in the posterior LEFT ethmoid sinus.\par \par Discussion:\par - Reviewed MRI results. Stable exam. No growth noted. No cerebral edema.\par - Patient remains asymptomatic.\par - No neurosurgical intervention is recommended.\par - Continue to follow with yearly MRI no contrast and OV. \par \par \par \par PLAN:\par MRI brain non con next year\par Follow up in office to review results

## 2022-06-13 NOTE — HISTORY OF PRESENT ILLNESS
[FreeTextEntry1] : MANDO MAYO is a right handed 66 year old female s/p fall in 2011, incidental finding of right parasagittal meningioma in 2011. Being conservatively managed and monitored for growth with serial scans. Patient has been asymptomatic. Denies headaches, weakness, seizure activity and other symptoms of motor, sensory, speech, or visual abnormalities. States she has noticed she drops things more frequently from the right hand.

## 2022-06-16 ENCOUNTER — RX RENEWAL (OUTPATIENT)
Age: 67
End: 2022-06-16

## 2022-06-29 ENCOUNTER — APPOINTMENT (OUTPATIENT)
Dept: INTERNAL MEDICINE | Facility: CLINIC | Age: 67
End: 2022-06-29

## 2022-06-29 VITALS
HEART RATE: 99 BPM | WEIGHT: 227 LBS | BODY MASS INDEX: 38.76 KG/M2 | HEIGHT: 64 IN | TEMPERATURE: 99 F | OXYGEN SATURATION: 97 %

## 2022-06-29 VITALS — DIASTOLIC BLOOD PRESSURE: 78 MMHG | SYSTOLIC BLOOD PRESSURE: 122 MMHG

## 2022-06-29 PROCEDURE — 99213 OFFICE O/P EST LOW 20 MIN: CPT

## 2022-06-29 NOTE — HISTORY OF PRESENT ILLNESS
[FreeTextEntry1] : f/u htn and mood [de-identified] : lost wt with intermittent fasting then regained when she had her kitchen redone\par reviewed nsx notes\par didn’t take meds today

## 2022-06-30 ENCOUNTER — TRANSCRIPTION ENCOUNTER (OUTPATIENT)
Age: 67
End: 2022-06-30

## 2022-06-30 LAB
ANION GAP SERPL CALC-SCNC: 12 MMOL/L
BUN SERPL-MCNC: 22 MG/DL
CALCIUM SERPL-MCNC: 9.4 MG/DL
CHLORIDE SERPL-SCNC: 104 MMOL/L
CHOLEST SERPL-MCNC: 281 MG/DL
CO2 SERPL-SCNC: 26 MMOL/L
CREAT SERPL-MCNC: 1.16 MG/DL
EGFR: 53 ML/MIN/1.73M2
ESTIMATED AVERAGE GLUCOSE: 137 MG/DL
GLUCOSE SERPL-MCNC: 119 MG/DL
HBA1C MFR BLD HPLC: 6.4 %
HDLC SERPL-MCNC: 60 MG/DL
LDLC SERPL CALC-MCNC: 159 MG/DL
NONHDLC SERPL-MCNC: 221 MG/DL
POTASSIUM SERPL-SCNC: 4.3 MMOL/L
SODIUM SERPL-SCNC: 142 MMOL/L
TRIGL SERPL-MCNC: 307 MG/DL

## 2022-11-21 ENCOUNTER — APPOINTMENT (OUTPATIENT)
Dept: INTERNAL MEDICINE | Facility: CLINIC | Age: 67
End: 2022-11-21

## 2022-11-21 VITALS — SYSTOLIC BLOOD PRESSURE: 138 MMHG | DIASTOLIC BLOOD PRESSURE: 82 MMHG

## 2022-11-21 VITALS
HEART RATE: 82 BPM | TEMPERATURE: 98.1 F | OXYGEN SATURATION: 98 % | WEIGHT: 228 LBS | HEIGHT: 64 IN | BODY MASS INDEX: 38.93 KG/M2

## 2022-11-21 DIAGNOSIS — F41.9 ANXIETY DISORDER, UNSPECIFIED: ICD-10-CM

## 2022-11-21 DIAGNOSIS — Z00.00 ENCOUNTER FOR GENERAL ADULT MEDICAL EXAMINATION W/OUT ABNORMAL FINDINGS: ICD-10-CM

## 2022-11-21 PROCEDURE — 36415 COLL VENOUS BLD VENIPUNCTURE: CPT

## 2022-11-21 PROCEDURE — G0009: CPT

## 2022-11-21 PROCEDURE — G0008: CPT

## 2022-11-21 PROCEDURE — G0447 BEHAVIOR COUNSEL OBESITY 15M: CPT | Mod: 59

## 2022-11-21 PROCEDURE — G0439: CPT

## 2022-11-21 PROCEDURE — 90677 PCV20 VACCINE IM: CPT

## 2022-11-21 PROCEDURE — 90686 IIV4 VACC NO PRSV 0.5 ML IM: CPT

## 2022-11-21 NOTE — COUNSELING
[Potential consequences of obesity discussed] : Potential consequences of obesity discussed [Structured Weight Management Program suggested:] : Structured weight management program suggested [Encouraged to maintain food diary] : Encouraged to maintain food diary [Encouraged to increase physical activity] : Encouraged to increase physical activity [Good understanding] : Patient has a good understanding of disease, goals and obesity follow-up plan [FreeTextEntry2] : discussed GLP1-a tx. [FreeTextEntry4] : 15

## 2022-11-21 NOTE — HISTORY OF PRESENT ILLNESS
[FreeTextEntry1] : cpx [de-identified] : some  upper GI symps s/p lapband--plans to see bariatric surgeon\par same meds--effexor /ramipril--not today\par seeing cardio ([facility]) for workup for cardiac type symps\par seeing nsx for yearly eval for meningioma

## 2022-11-22 ENCOUNTER — TRANSCRIPTION ENCOUNTER (OUTPATIENT)
Age: 67
End: 2022-11-22

## 2022-11-22 LAB
ALBUMIN SERPL ELPH-MCNC: 4.6 G/DL
ALP BLD-CCNC: 50 U/L
ALT SERPL-CCNC: 33 U/L
ANION GAP SERPL CALC-SCNC: 11 MMOL/L
APPEARANCE: ABNORMAL
AST SERPL-CCNC: 22 U/L
BACTERIA: NEGATIVE
BASOPHILS # BLD AUTO: 0.05 K/UL
BASOPHILS NFR BLD AUTO: 1 %
BILIRUB SERPL-MCNC: 0.2 MG/DL
BILIRUBIN URINE: NEGATIVE
BLOOD URINE: ABNORMAL
BUN SERPL-MCNC: 16 MG/DL
CALCIUM SERPL-MCNC: 9.3 MG/DL
CHLORIDE SERPL-SCNC: 102 MMOL/L
CHOLEST SERPL-MCNC: 261 MG/DL
CO2 SERPL-SCNC: 27 MMOL/L
COLOR: NORMAL
CREAT SERPL-MCNC: 0.69 MG/DL
EGFR: 97 ML/MIN/1.73M2
EOSINOPHIL # BLD AUTO: 0.12 K/UL
EOSINOPHIL NFR BLD AUTO: 2.4 %
ESTIMATED AVERAGE GLUCOSE: 128 MG/DL
GLUCOSE QUALITATIVE U: NEGATIVE
GLUCOSE SERPL-MCNC: 123 MG/DL
HBA1C MFR BLD HPLC: 6.1 %
HCT VFR BLD CALC: 44.9 %
HDLC SERPL-MCNC: 66 MG/DL
HGB BLD-MCNC: 14.2 G/DL
HYALINE CASTS: 0 /LPF
IMM GRANULOCYTES NFR BLD AUTO: 0.2 %
KETONES URINE: NEGATIVE
LDLC SERPL CALC-MCNC: 166 MG/DL
LEUKOCYTE ESTERASE URINE: NEGATIVE
LYMPHOCYTES # BLD AUTO: 1.83 K/UL
LYMPHOCYTES NFR BLD AUTO: 37 %
MAN DIFF?: NORMAL
MCHC RBC-ENTMCNC: 30.5 PG
MCHC RBC-ENTMCNC: 31.6 GM/DL
MCV RBC AUTO: 96.6 FL
MICROSCOPIC-UA: NORMAL
MONOCYTES # BLD AUTO: 0.49 K/UL
MONOCYTES NFR BLD AUTO: 9.9 %
NEUTROPHILS # BLD AUTO: 2.45 K/UL
NEUTROPHILS NFR BLD AUTO: 49.5 %
NITRITE URINE: NEGATIVE
NONHDLC SERPL-MCNC: 195 MG/DL
PH URINE: 5.5
PLATELET # BLD AUTO: 194 K/UL
POTASSIUM SERPL-SCNC: 4.4 MMOL/L
PROT SERPL-MCNC: 6.7 G/DL
PROTEIN URINE: NORMAL
RBC # BLD: 4.65 M/UL
RBC # FLD: 13.6 %
RED BLOOD CELLS URINE: 1 /HPF
SODIUM SERPL-SCNC: 140 MMOL/L
SPECIFIC GRAVITY URINE: 1.02
SQUAMOUS EPITHELIAL CELLS: 7 /HPF
TRIGL SERPL-MCNC: 146 MG/DL
TSH SERPL-ACNC: 2.15 UIU/ML
URIC ACID CRYSTALS: ABNORMAL
UROBILINOGEN URINE: NORMAL
WBC # FLD AUTO: 4.95 K/UL
WHITE BLOOD CELLS URINE: 2 /HPF

## 2022-11-28 ENCOUNTER — TRANSCRIPTION ENCOUNTER (OUTPATIENT)
Age: 67
End: 2022-11-28

## 2022-12-22 ENCOUNTER — RX RENEWAL (OUTPATIENT)
Age: 67
End: 2022-12-22

## 2023-04-08 ENCOUNTER — NON-APPOINTMENT (OUTPATIENT)
Age: 68
End: 2023-04-08

## 2023-04-20 ENCOUNTER — OUTPATIENT (OUTPATIENT)
Dept: OUTPATIENT SERVICES | Facility: HOSPITAL | Age: 68
LOS: 1 days | End: 2023-04-20
Payer: MEDICARE

## 2023-04-20 ENCOUNTER — APPOINTMENT (OUTPATIENT)
Dept: BARIATRICS | Facility: CLINIC | Age: 68
End: 2023-04-20
Payer: MEDICARE

## 2023-04-20 VITALS
DIASTOLIC BLOOD PRESSURE: 78 MMHG | OXYGEN SATURATION: 99 % | BODY MASS INDEX: 39.67 KG/M2 | SYSTOLIC BLOOD PRESSURE: 130 MMHG | HEIGHT: 64 IN | HEART RATE: 87 BPM | TEMPERATURE: 97 F | WEIGHT: 232.36 LBS

## 2023-04-20 DIAGNOSIS — Z98.84 BARIATRIC SURGERY STATUS: ICD-10-CM

## 2023-04-20 DIAGNOSIS — R13.10 DYSPHAGIA, UNSPECIFIED: ICD-10-CM

## 2023-04-20 PROCEDURE — 74220 X-RAY XM ESOPHAGUS 1CNTRST: CPT

## 2023-04-20 PROCEDURE — S2083 ADJUSTMENT GASTRIC BAND: CPT

## 2023-04-20 PROCEDURE — 74220 X-RAY XM ESOPHAGUS 1CNTRST: CPT | Mod: 26

## 2023-04-20 PROCEDURE — 99215 OFFICE O/P EST HI 40 MIN: CPT | Mod: 25

## 2023-04-20 RX ORDER — CIPROFLOXACIN HYDROCHLORIDE 500 MG/1
500 TABLET, FILM COATED ORAL
Qty: 10 | Refills: 0 | Status: DISCONTINUED | COMMUNITY
Start: 2020-03-25 | End: 2023-04-20

## 2023-04-20 RX ORDER — ESTRADIOL 0.1 MG/G
0.1 CREAM VAGINAL
Qty: 1 | Refills: 6 | Status: DISCONTINUED | COMMUNITY
Start: 2021-02-22 | End: 2023-04-20

## 2023-05-01 NOTE — REVIEW OF SYSTEMS
[Constipation] : constipation [Reflux/Heartburn] : reflux/heartburn [Negative] : Allergic/Immunologic [FreeTextEntry7] : see HPI

## 2023-05-01 NOTE — PROCEDURE
[FreeTextEntry1] : BAND adjustment was performed today. Using standard sterile technique, a 20 - gauge Esposito needle was inserted into the LAP- BAND port.\par \par Under sterile conditions, the port was accessed without complication using manual guidance and non-coring needle. On access, no fluid was found, so port was flushed but with resistance.

## 2023-05-01 NOTE — ADDENDUM
[FreeTextEntry1] : Pt seen with PA. \par VE Performed today.  Personally reviewed and findings shared with patent.\par No evidence of prolapse or erosion and orientation of the band remains appropriate however reflux is noted.   Attempts at adjusting the band were met with a great deal of resistance.  Suspect kink in tubing at abdominal wall fracture of tubing resulting in collapse with negative pressure.\par \par The patient has gained weight since previous encounter and may benefit from revisional surgery (ie Band to sleeve).  I"ve recommended she undergo EGD and or manometry. Studies to assess the integrity of the esophagus after which more appropriate discussion as to revision vs lap band removal alone will be had.

## 2023-05-01 NOTE — PHYSICAL EXAM
[Normal] : affect appropriate [de-identified] : No visualized JVD or audible bruits  [de-identified] : Equal chest rise, non-labored respirations, no audible wheezing  [de-identified] : Regular rate, no audible carotid bruits or JVD appreciated  [de-identified] : soft, NT, ND, no diastasis or hernias appreciated, incision from band well healed. port to lateral Left flank without tenderness, erythema or swelling [de-identified] : BRIDGETTE

## 2023-05-01 NOTE — HISTORY OF PRESENT ILLNESS
[Procedure: ___] : Procedure performed: [unfilled]  [Date of Surgery: ___] : Date of Surgery:   [unfilled] [Surgeon Name:   ___] : Surgeon Name: Dr. BUSCH [Pre-Op Weight ___] : Pre-op weight was [unfilled] lbs [de-identified] : 67 year old F is s/p lap band placement. Presents today for routine follow up. Weight gain since last visit 2019. Consuming 2 meals/day, having coffee for breakfast. Daytime reflux to solid foods for about a year. Pt adds epigastric pain occasionally after eating for the last few months. Otherwise no nausea or vomiting. Drinking 36 oz/day of zero calorie liquid. Pt adds drinks half a glass of wine as stress relief. Reports constipation, taking Dulcolax daily. Pt would like to discuss alternative weight loss options if band was to be removed.\par

## 2023-05-01 NOTE — ASSESSMENT
[FreeTextEntry1] : 67 year old F is s/p lap band placement. Presents today for routine follow up. Weight gain since last visit 2019. Pt would like to discuss alternative weight loss options if band was to be removed.\par Doing well overall.\par \par VE today normal: no obstruction, prolapse or other abnormality\par Most likely band not functioning due to resistance noted on accessing the port \par Discussed surgical options with pt. Risks/benefits/alternatives also discussed.\par \par Greater than 15 minutes spent with pt discussing importance of health maintenance principles including dietary and lifestyle changes as well as long-term weight maintenance\par Reviewed guidelines about nutrition and snacking - protein focus diet with 3 meals/day\par Encouraged better food choices - maintain food log\par Daily zero calorie liquid intake goal of 64 oz/day\par Encouraged to participate in a daily exercise regimen incorporating cardio and strength training\par Track steps - goal approximately 8-10k steps per day\par Decrease caffeine intake\par Stress reduction modalities\par \par GI referral for reflux workup\par Return to office after reflux workup\par Alternative options to weight loss discussed - surgical option will be dependent on findings of reflux workup\par All questions answered\par \par Pt seen with Dr. Kowalski \par \par Additional time spent before and after visit reviewing chart.

## 2023-05-24 ENCOUNTER — RX RENEWAL (OUTPATIENT)
Age: 68
End: 2023-05-24

## 2023-05-25 ENCOUNTER — RX RENEWAL (OUTPATIENT)
Age: 68
End: 2023-05-25

## 2023-06-07 ENCOUNTER — OUTPATIENT (OUTPATIENT)
Dept: OUTPATIENT SERVICES | Facility: HOSPITAL | Age: 68
LOS: 1 days | End: 2023-06-07
Payer: MEDICARE

## 2023-06-07 ENCOUNTER — APPOINTMENT (OUTPATIENT)
Dept: NEUROSURGERY | Facility: CLINIC | Age: 68
End: 2023-06-07
Payer: MEDICARE

## 2023-06-07 ENCOUNTER — APPOINTMENT (OUTPATIENT)
Dept: MRI IMAGING | Facility: IMAGING CENTER | Age: 68
End: 2023-06-07
Payer: MEDICARE

## 2023-06-07 VITALS — OXYGEN SATURATION: 98 % | BODY MASS INDEX: 39.27 KG/M2 | RESPIRATION RATE: 16 BRPM | HEIGHT: 64 IN | WEIGHT: 230 LBS

## 2023-06-07 DIAGNOSIS — D32.9 BENIGN NEOPLASM OF MENINGES, UNSPECIFIED: ICD-10-CM

## 2023-06-07 PROCEDURE — 70551 MRI BRAIN STEM W/O DYE: CPT

## 2023-06-07 PROCEDURE — 99214 OFFICE O/P EST MOD 30 MIN: CPT

## 2023-06-07 PROCEDURE — 70551 MRI BRAIN STEM W/O DYE: CPT | Mod: 26,MH

## 2023-06-07 NOTE — ASSESSMENT
[FreeTextEntry1] : Ms Portre is a 67 year old female with an incidental right parasagittal meningoma in 2011. She has been monitored yearly with serial MRIs. She reports feeling well and reports no neurological deficits. \par \par Discussion\par The tumor has grown significantly from 2011  \par Radiation is not the best option because of the size of tumor \par Resecting the meningioma is the best option- the tumor will likely not grow back or require radiation \par Pt and  agree  would like tumor to be removed before symptoms manifest- would prefer surgery in 7/2023\par Functional MRI preop.\par Medical clearance \par PST\par \par Surgery recommendation was result of shared decision making.All surgical  and non-surgical options were discussed. After detailed imaging review and patient examination surgical intervention was discussed with the patient to halt progression of symptoms. \par Potential surgical risks and benefits and alternative discussed with time allowed for questions and answers given. \par Pre-op requirements and postop recovery and expectations discussed regarding the benefits and risks for surgery.\par Instructions to Stop taking all non-steroidal anti-inflammatory medicines (ibuprofen, naproxen, etc.) and blood thinners (Coumadin, Plavix, aspirin, etc.) 7-10 days before surgery. Stop using nicotine and drinking alcohol 1 week before and 2 weeks after surgery to avoid bleeding and healing problems.\par \par .Filemon ABDI evaluated the patient with the nurse practitioner Joann Barthelemy and established the plan of care. I personally discuss this patient with the nurse practitioner at the time of the visit. I agree with the assessment and plan as written, unless noted below. \par  \par \par

## 2023-06-07 NOTE — PHYSICAL EXAM
[General Appearance - Alert] : alert [General Appearance - In No Acute Distress] : in no acute distress [General Appearance - Well Nourished] : well nourished [Oriented To Time, Place, And Person] : oriented to person, place, and time [Impaired Insight] : insight and judgment were intact [Affect] : the affect was normal [Memory Recent] : recent memory was not impaired [Memory Remote] : remote memory was not impaired [Person] : oriented to person [Place] : oriented to place [Time] : oriented to time [Motor Strength] : muscle strength was normal in all four extremities [Balance] : balance was intact [PERRL With Normal Accommodation] : pupils were equal in size, round, reactive to light, with normal accommodation [Extraocular Movements] : extraocular movements were intact [Neck Appearance] : the appearance of the neck was normal [] : no respiratory distress [Abnormal Walk] : normal gait [Involuntary Movements] : no involuntary movements were seen [Motor Tone] : muscle strength and tone were normal

## 2023-06-07 NOTE — REASON FOR VISIT
[Follow-Up: _____] : a [unfilled] follow-up visit [Spouse] : spouse [FreeTextEntry1] : MANDO MAYO is a right handed 67 year old female s/p fall in 2011, incidental finding of right parasagittal meningioma in 2011. Being conservatively managed and monitored for growth with serial scans. Patient has been asymptomatic. \par \par Today she reports feeling well. Denies headaches, weakness, seizure activity and other symptoms of motor, sensory, speech, or visual abnormalities.

## 2023-06-22 ENCOUNTER — APPOINTMENT (OUTPATIENT)
Dept: GASTROENTEROLOGY | Facility: CLINIC | Age: 68
End: 2023-06-22
Payer: MEDICARE

## 2023-06-22 VITALS
BODY MASS INDEX: 39.27 KG/M2 | OXYGEN SATURATION: 97 % | TEMPERATURE: 97.2 F | WEIGHT: 230 LBS | SYSTOLIC BLOOD PRESSURE: 125 MMHG | HEIGHT: 64 IN | DIASTOLIC BLOOD PRESSURE: 81 MMHG | HEART RATE: 145 BPM

## 2023-06-22 DIAGNOSIS — K59.09 OTHER CONSTIPATION: ICD-10-CM

## 2023-06-22 PROCEDURE — 99205 OFFICE O/P NEW HI 60 MIN: CPT

## 2023-06-22 NOTE — PHYSICAL EXAM
[Alert] : alert [Normal Voice/Communication] : normal voice/communication [Healthy Appearing] : healthy appearing [No Acute Distress] : no acute distress [Sclera] : the sclera and conjunctiva were normal [Hearing Threshold Finger Rub Not Armstrong] : hearing was normal [Normal Lips/Gums] : the lips and gums were normal [Oropharynx] : the oropharynx was normal [Normal Appearance] : the appearance of the neck was normal [No Neck Mass] : no neck mass was observed [No Respiratory Distress] : no respiratory distress [No Acc Muscle Use] : no accessory muscle use [Respiration, Rhythm And Depth] : normal respiratory rhythm and effort [Auscultation Breath Sounds / Voice Sounds] : lungs were clear to auscultation bilaterally [Heart Rate And Rhythm] : heart rate was normal and rhythm regular [Normal S1, S2] : normal S1 and S2 [Murmurs] : no murmurs [Bowel Sounds] : normal bowel sounds [Abdomen Tenderness] : non-tender [No Masses] : no abdominal mass palpated [Abdomen Soft] : soft [] : no hepatosplenomegaly [Oriented To Time, Place, And Person] : oriented to person, place, and time

## 2023-06-22 NOTE — ASSESSMENT
[FreeTextEntry1] : 67-year-old female with BMI of 39.5/history of lap band/HTN/HLD/depression here for evaluation of reflux preceding potential weight loss surgery.  Patient will need lap band to be removed, if patient has abnormal reflux profile, then she should undergo Jean Pierre-en-Y bypass.  If her reflux profile appears normal, then she should also be considered for a potential gastric sleeve.\par \par Patient to undergo meningioma surgery mid July, I counseled her that she should have her reflux studies performed postsurgically, typically we will allow recovery time of 4 weeks to ensure healing.\par \par 1.  EGD plus Bravo 96-hour pH capsule off of antacids\par The risks, benefits and alternatives of the procedure were discussed with the patient in detail. Risks include bleeding, infection, bowel perforation, dysphagia and chest discomfort from the BRAVO capsule. The patient was also instructed that an MRI is not permitted within 30 days after BRAVO is placed, unless there is confirmed expulsion of the capsule. All questions were answered. The patient expressed understanding of these risks and is agreeable to proceed. I instructed the patient to discontinue his/her PPI x 7 days prior to procedure.\par \par

## 2023-06-22 NOTE — CONSULT LETTER
[Dear  ___] : Dear  [unfilled], [Consult Letter:] : I had the pleasure of evaluating your patient, [unfilled]. [Please see my note below.] : Please see my note below. [Consult Closing:] : Thank you very much for allowing me to participate in the care of this patient.  If you have any questions, please do not hesitate to contact me. [Sincerely,] : Sincerely, [FreeTextEntry2] : Dr. Jarred Kowalski [FreeTextEntry3] : Jennifer Espinoza MD\par Director of GI Motility, [facility]\par  of Medicine\par Division of Gastroenterology\par [facility] at [address], [address]\[place] [address]\par Tel: [phone]\par Motility Appts-Motility Coordinator: Ade Balbuena: [phone]\par

## 2023-06-22 NOTE — HISTORY OF PRESENT ILLNESS
[FreeTextEntry1] : 67-year-old pleasant female with history of obesity/HLD/being referred by Dr. Jarred Kowalski for reflux work-up\par Patient has history of lap band placement, has gained weight since 2019, she has seen her bariatric surgeon to discuss alternate weight loss options if band were to be removed.  Dr. Kowalski attempted to deflate the band, but found it to be difficult to deflate.  Subsequently the patient underwent esophagram which demonstrated mild reflux.  There was mild esophageal dysmotility.  Gastric band was in place.  There was free passage of contrast.\par \par On GI review of systems, patient does have intermittent dysphagia to specific solids, such as bread.  She has no dysphagia to liquids.  She denies any regurgitant symptoms.  She denies any chest pain, retrosternal burning or classic symptoms of heartburn.  She has a known meningioma and is scheduled for surgery July 18.  Her last endoscopy was done in 2013.  Her last colonoscopy was performed 9/2022, 3 polyps were removed.

## 2023-06-27 ENCOUNTER — OUTPATIENT (OUTPATIENT)
Dept: OUTPATIENT SERVICES | Facility: HOSPITAL | Age: 68
LOS: 1 days | End: 2023-06-27
Payer: MEDICARE

## 2023-06-27 VITALS
HEART RATE: 88 BPM | TEMPERATURE: 98 F | RESPIRATION RATE: 16 BRPM | DIASTOLIC BLOOD PRESSURE: 80 MMHG | SYSTOLIC BLOOD PRESSURE: 122 MMHG | HEIGHT: 64 IN | WEIGHT: 231.04 LBS | OXYGEN SATURATION: 97 %

## 2023-06-27 DIAGNOSIS — Z01.818 ENCOUNTER FOR OTHER PREPROCEDURAL EXAMINATION: ICD-10-CM

## 2023-06-27 DIAGNOSIS — Z29.9 ENCOUNTER FOR PROPHYLACTIC MEASURES, UNSPECIFIED: ICD-10-CM

## 2023-06-27 DIAGNOSIS — G93.89 OTHER SPECIFIED DISORDERS OF BRAIN: ICD-10-CM

## 2023-06-27 DIAGNOSIS — G47.33 OBSTRUCTIVE SLEEP APNEA (ADULT) (PEDIATRIC): ICD-10-CM

## 2023-06-27 DIAGNOSIS — Z98.84 BARIATRIC SURGERY STATUS: Chronic | ICD-10-CM

## 2023-06-27 DIAGNOSIS — I10 ESSENTIAL (PRIMARY) HYPERTENSION: ICD-10-CM

## 2023-06-27 DIAGNOSIS — D32.9 BENIGN NEOPLASM OF MENINGES, UNSPECIFIED: ICD-10-CM

## 2023-06-27 DIAGNOSIS — Z86.59 PERSONAL HISTORY OF OTHER MENTAL AND BEHAVIORAL DISORDERS: ICD-10-CM

## 2023-06-27 LAB
ALBUMIN SERPL ELPH-MCNC: 4.8 G/DL — SIGNIFICANT CHANGE UP (ref 3.3–5)
ALP SERPL-CCNC: 52 U/L — SIGNIFICANT CHANGE UP (ref 40–120)
ALT FLD-CCNC: 23 U/L — SIGNIFICANT CHANGE UP (ref 10–45)
ANION GAP SERPL CALC-SCNC: 16 MMOL/L — SIGNIFICANT CHANGE UP (ref 5–17)
AST SERPL-CCNC: 21 U/L — SIGNIFICANT CHANGE UP (ref 10–40)
BILIRUB SERPL-MCNC: 0.4 MG/DL — SIGNIFICANT CHANGE UP (ref 0.2–1.2)
BLD GP AB SCN SERPL QL: NEGATIVE — SIGNIFICANT CHANGE UP
BUN SERPL-MCNC: 19 MG/DL — SIGNIFICANT CHANGE UP (ref 7–23)
CALCIUM SERPL-MCNC: 9.6 MG/DL — SIGNIFICANT CHANGE UP (ref 8.4–10.5)
CHLORIDE SERPL-SCNC: 103 MMOL/L — SIGNIFICANT CHANGE UP (ref 96–108)
CO2 SERPL-SCNC: 24 MMOL/L — SIGNIFICANT CHANGE UP (ref 22–31)
CREAT SERPL-MCNC: 1.08 MG/DL — SIGNIFICANT CHANGE UP (ref 0.5–1.3)
EGFR: 57 ML/MIN/1.73M2 — LOW
GLUCOSE SERPL-MCNC: 141 MG/DL — HIGH (ref 70–99)
HCT VFR BLD CALC: 42.4 % — SIGNIFICANT CHANGE UP (ref 34.5–45)
HGB BLD-MCNC: 14.1 G/DL — SIGNIFICANT CHANGE UP (ref 11.5–15.5)
MCHC RBC-ENTMCNC: 29.4 PG — SIGNIFICANT CHANGE UP (ref 27–34)
MCHC RBC-ENTMCNC: 33.3 GM/DL — SIGNIFICANT CHANGE UP (ref 32–36)
MCV RBC AUTO: 88.5 FL — SIGNIFICANT CHANGE UP (ref 80–100)
NRBC # BLD: 0 /100 WBCS — SIGNIFICANT CHANGE UP (ref 0–0)
PLATELET # BLD AUTO: 209 K/UL — SIGNIFICANT CHANGE UP (ref 150–400)
POTASSIUM SERPL-MCNC: 4.2 MMOL/L — SIGNIFICANT CHANGE UP (ref 3.5–5.3)
POTASSIUM SERPL-SCNC: 4.2 MMOL/L — SIGNIFICANT CHANGE UP (ref 3.5–5.3)
PROT SERPL-MCNC: 7.3 G/DL — SIGNIFICANT CHANGE UP (ref 6–8.3)
RBC # BLD: 4.79 M/UL — SIGNIFICANT CHANGE UP (ref 3.8–5.2)
RBC # FLD: 13.1 % — SIGNIFICANT CHANGE UP (ref 10.3–14.5)
RH IG SCN BLD-IMP: POSITIVE — SIGNIFICANT CHANGE UP
SODIUM SERPL-SCNC: 143 MMOL/L — SIGNIFICANT CHANGE UP (ref 135–145)
WBC # BLD: 5.86 K/UL — SIGNIFICANT CHANGE UP (ref 3.8–10.5)
WBC # FLD AUTO: 5.86 K/UL — SIGNIFICANT CHANGE UP (ref 3.8–10.5)

## 2023-06-27 PROCEDURE — G0463: CPT

## 2023-06-27 PROCEDURE — 86850 RBC ANTIBODY SCREEN: CPT

## 2023-06-27 PROCEDURE — 87641 MR-STAPH DNA AMP PROBE: CPT

## 2023-06-27 PROCEDURE — 87640 STAPH A DNA AMP PROBE: CPT

## 2023-06-27 PROCEDURE — 85027 COMPLETE CBC AUTOMATED: CPT

## 2023-06-27 PROCEDURE — 86901 BLOOD TYPING SEROLOGIC RH(D): CPT

## 2023-06-27 PROCEDURE — 86900 BLOOD TYPING SEROLOGIC ABO: CPT

## 2023-06-27 PROCEDURE — 80053 COMPREHEN METABOLIC PANEL: CPT

## 2023-06-27 PROCEDURE — 83036 HEMOGLOBIN GLYCOSYLATED A1C: CPT

## 2023-06-27 RX ORDER — CHLORHEXIDINE GLUCONATE 213 G/1000ML
1 SOLUTION TOPICAL ONCE
Refills: 0 | Status: DISCONTINUED | OUTPATIENT
Start: 2023-07-18 | End: 2023-07-22

## 2023-06-27 NOTE — H&P PST ADULT - HISTORY OF PRESENT ILLNESS
2011 fainted [facility] brain tumor, meningioma,   gastric band 2013  right repaired meniscus  laminectomy lumbar  gallbladder  colonoscopy 9/2022 -   covid    68 yo female, PMH HTN, HLD, depression, pre-diabetic, fatty liver, morbid obesity s/p lap band 2013, lost weight and gained it back - recent visit to bariatric GI - unable to deflate due to resistance on accessing port (note in chart) Pt. reports that in 2011 she fainted while in [employer], taken to [facility] and CT scan revealed a cerebral tumor, which was later diagnosed as a meningioma - she has been under observation and it has grown in size. Pt. presents to PST for scheduled Right Craniotomy for Tumor on 7/18/23. Pt. reports SCOTT which is unchanged, does not have an active lifestyle Denies dizziness, vision changes, headaches, recent fever, chills, chest pain, SOB, changes in bowel/urinary habits or recent exposure to COVID-19 infection. Pt. denies clotting or bleeding disorders.

## 2023-06-27 NOTE — H&P PST ADULT - EYES
Medication name: tramadol-acetaminophen (ULTRACET) 37.5-325 MG per tablet  Last Refill Details: Date 04/13/2023, Quantity:  15, refills 0   Ordering provider name: Washington Perez MD  Last office visit: 01/10/2023 with provider Washington Perez MD   Unable to refill medication per established guidelines, request forwarded to provider for review.  Caller advised to contact office for follow-up.     PERRL/conjunctiva clear

## 2023-06-27 NOTE — H&P PST ADULT - NEGATIVE ENMT SYMPTOMS
no sinus symptoms/no nasal congestion/no throat pain/no dysphagia no vertigo/no sinus symptoms/no nasal congestion/no throat pain/no dysphagia

## 2023-06-27 NOTE — H&P PST ADULT - NEGATIVE NEUROLOGICAL SYMPTOMS
no weakness/no paresthesias/no syncope/no vertigo/no headache no weakness/no paresthesias/no generalized seizures/no focal seizures/no syncope/no tremors/no vertigo/no loss of sensation/no difficulty walking/no headache/no confusion

## 2023-06-27 NOTE — H&P PST ADULT - ANESTHESIA, PREVIOUS REACTION, PROFILE
none last time under anethesia 9/2022 without having lap band deflated - no issues with aspiration/none

## 2023-06-27 NOTE — H&P PST ADULT - ASSESSMENT
Activity: Can walk 2-3 blocks, one flight of stairs, carries laundry    DASI scale:     Mallampati: 3    Dentition: Denies loose teeth/dentures, saw dentist 4 years ago Activity: Can walk 2-3 blocks, one flight of stairs, carries laundry    DASI scale: 4.64    Mallampati: 3    Dentition: Denies loose teeth/dentures, saw dentist 4 years ago    CAPRINI VTE 2.0 SCORE [CLOT updated 2019]    AGE RELATED RISK FACTORS                                                       MOBILITY RELATED FACTORS  [ ] Age 41-60 years                                            (1 Point)                    [ ] Bed rest                                                        (1 Point)  [x ] Age: 61-74 years                                           (2 Points)                  [ ] Plaster cast                                                   (2 Points)  [ ] Age= 75 years                                              (3 Points)                    [ ] Bed bound for more than 72 hours                 (2 Points)    DISEASE RELATED RISK FACTORS                                               GENDER SPECIFIC FACTORS  [ ] Edema in the lower extremities                       (1 Point)              [ ] Pregnancy                                                     (1 Point)  [ ] Varicose veins                                               (1 Point)                     [ ] Post-partum < 6 weeks                                   (1 Point)             [x ] BMI > 25 Kg/m2                                            (1 Point)                     [ ] Hormonal therapy  or oral contraception          (1 Point)                 [ ] Sepsis (in the previous month)                        (1 Point)               [ ] History of pregnancy complications                 (1 point)  [ ] Pneumonia or serious lung disease                                               [ ] Unexplained or recurrent                     (1 Point)           (in the previous month)                               (1 Point)  [ ] Abnormal pulmonary function test                     (1 Point)                 SURGERY RELATED RISK FACTORS  [ ] Acute myocardial infarction                              (1 Point)               [ ]  Section                                             (1 Point)  [ ] Congestive heart failure (in the previous month)  (1 Point)      [ ] Minor surgery                                                  (1 Point)   [ ] Inflammatory bowel disease                             (1 Point)               [ ] Arthroscopic surgery                                        (2 Points)  [ ] Central venous access                                      (2 Points)                [ x] General surgery lasting more than 45 minutes (2 points)  [ ] Malignancy- Present or previous                   (2 Points)                [ ] Elective arthroplasty                                         (5 points)    [ ] Stroke (in the previous month)                          (5 Points)                                                                                                                                                           HEMATOLOGY RELATED FACTORS                                                 TRAUMA RELATED RISK FACTORS  [ ] Prior episodes of VTE                                     (3 Points)                [ ] Fracture of the hip, pelvis, or leg                       (5 Points)  [ ] Positive family history for VTE                         (3 Points)             [ ] Acute spinal cord injury (in the previous month)  (5 Points)  [ ] Prothrombin 73543 A                                     (3 Points)               [ ] Paralysis  (less than 1 month)                             (5 Points)  [ ] Factor V Leiden                                             (3 Points)                  [ ] Multiple Trauma within 1 month                        (5 Points)  [ ] Lupus anticoagulants                                     (3 Points)                                                           [ ] Anticardiolipin antibodies                               (3 Points)                                                       [ ] High homocysteine in the blood                      (3 Points)                                             [ ] Other congenital or acquired thrombophilia      (3 Points)                                                [ ] Heparin induced thrombocytopenia                  (3 Points)                                     Total Score [    5      ]

## 2023-06-27 NOTE — H&P PST ADULT - ATTENDING COMMENTS
The patient has a right parietal meningioma that has continued to enlarge steadily on MRI over the last decade. She has no deficit from this. No edema on MRI. She requested removal of the tumor to avoid further enlargement and then developing symptoms in the coming years.     P) Right craniotomy for tumor removal. I have discussed the risks, benefits, and alternatives to surgery with the patient and her . In particular, the risks of bleeding, infection, seizures, and new neurological deficit (especially left sided weakness and sensory loss) were discussed. All questions were answered.

## 2023-06-27 NOTE — H&P PST ADULT - NSICDXPASTSURGICALHX_GEN_ALL_CORE_FT
PAST SURGICAL HISTORY:  S/P cholecystectomy 2011    S/P knee surgery right knee meniscus repair 2010    S/P laminectomy 2000    Uterine disorder ablation 2005     PAST SURGICAL HISTORY:  H/O laparoscopic adjustable gastric banding     S/P cholecystectomy 2011    S/P knee surgery right knee meniscus repair 2010    S/P laminectomy 2000    Uterine disorder ablation 2005

## 2023-06-27 NOTE — H&P PST ADULT - ENMT COMMENTS
sometimes food gets stuck and goes away after I drink water "sometimes food gets stuck and goes away after I drink water"

## 2023-06-27 NOTE — H&P PST ADULT - PROBLEM SELECTOR PLAN 1
Right Craniotomy for Tumor on 7/18/23  Pre-op instructions, including Chlorhexidine soap, provided - all questions answered  Labs done at PST

## 2023-06-27 NOTE — H&P PST ADULT - OTHER CARE PROVIDERS
Dr. Jarred Kowalski, GI, [phone], Dr. Norberto Soto, [phone]; Dr. Jarred Kowalski, , [phone],

## 2023-06-27 NOTE — H&P PST ADULT - NSICDXPASTMEDICALHX_GEN_ALL_CORE_FT
PAST MEDICAL HISTORY:  Depression     Diabetes mellitus Patient reports that she had started Metformin and c/o dizziness. PMD decided to d/c medication d/t r/o hypoglycemia. Patient reports not taking any medication at this time    Hemangioma     Hypertension     Sleep apnea uses CPAP machine    Tinea versicolor      PAST MEDICAL HISTORY:  Depression     H/O colonoscopy     Hemangioma     Hypertension     Meningioma     Pre-diabetes     Sleep apnea uses CPAP machine    Tinea versicolor

## 2023-06-27 NOTE — H&P PST ADULT - NEGATIVE GENERAL GENITOURINARY SYMPTOMS
no bladder infections/no incontinence/normal urinary frequency no bladder infections/no dysuria/normal urinary frequency

## 2023-06-28 LAB
A1C WITH ESTIMATED AVERAGE GLUCOSE RESULT: 6.7 % — HIGH (ref 4–5.6)
ESTIMATED AVERAGE GLUCOSE: 146 MG/DL — HIGH (ref 68–114)
MRSA PCR RESULT.: SIGNIFICANT CHANGE UP
S AUREUS DNA NOSE QL NAA+PROBE: SIGNIFICANT CHANGE UP

## 2023-07-07 RX ORDER — RAMIPRIL 2.5 MG/1
2.5 CAPSULE ORAL
Qty: 90 | Refills: 1 | Status: DISCONTINUED | COMMUNITY
Start: 2021-02-22 | End: 2023-07-07

## 2023-07-09 ENCOUNTER — TRANSCRIPTION ENCOUNTER (OUTPATIENT)
Age: 68
End: 2023-07-09

## 2023-07-10 ENCOUNTER — APPOINTMENT (OUTPATIENT)
Dept: MRI IMAGING | Facility: HOSPITAL | Age: 68
End: 2023-07-10

## 2023-07-10 ENCOUNTER — OUTPATIENT (OUTPATIENT)
Dept: OUTPATIENT SERVICES | Facility: HOSPITAL | Age: 68
LOS: 1 days | End: 2023-07-10
Payer: MEDICARE

## 2023-07-10 DIAGNOSIS — Z98.84 BARIATRIC SURGERY STATUS: Chronic | ICD-10-CM

## 2023-07-10 DIAGNOSIS — Z00.00 ENCOUNTER FOR GENERAL ADULT MEDICAL EXAMINATION WITHOUT ABNORMAL FINDINGS: ICD-10-CM

## 2023-07-10 DIAGNOSIS — D32.9 BENIGN NEOPLASM OF MENINGES, UNSPECIFIED: ICD-10-CM

## 2023-07-10 PROCEDURE — 70553 MRI BRAIN STEM W/O & W/DYE: CPT | Mod: 26,MH

## 2023-07-10 PROCEDURE — 70555 FMRI BRAIN BY PHYS/PSYCH: CPT

## 2023-07-10 PROCEDURE — 70553 MRI BRAIN STEM W/O & W/DYE: CPT

## 2023-07-10 PROCEDURE — 70555 FMRI BRAIN BY PHYS/PSYCH: CPT | Mod: 26,59,MH

## 2023-07-10 PROCEDURE — A9585: CPT

## 2023-07-12 ENCOUNTER — APPOINTMENT (OUTPATIENT)
Dept: INTERNAL MEDICINE | Facility: CLINIC | Age: 68
End: 2023-07-12
Payer: MEDICARE

## 2023-07-12 VITALS
BODY MASS INDEX: 39.95 KG/M2 | WEIGHT: 234 LBS | SYSTOLIC BLOOD PRESSURE: 123 MMHG | TEMPERATURE: 98.1 F | HEART RATE: 99 BPM | OXYGEN SATURATION: 98 % | HEIGHT: 64 IN | DIASTOLIC BLOOD PRESSURE: 76 MMHG

## 2023-07-12 DIAGNOSIS — Z23 ENCOUNTER FOR IMMUNIZATION: ICD-10-CM

## 2023-07-12 DIAGNOSIS — Z92.29 PERSONAL HISTORY OF OTHER DRUG THERAPY: ICD-10-CM

## 2023-07-12 DIAGNOSIS — Z01.419 ENCOUNTER FOR GYNECOLOGICAL EXAMINATION (GENERAL) (ROUTINE) W/OUT ABNORMAL FINDINGS: ICD-10-CM

## 2023-07-12 DIAGNOSIS — F54 PSYCHOLOGICAL AND BEHAVIORAL FACTORS ASSOCIATED WITH DISORDERS OR DISEASES CLASSIFIED ELSEWHERE: ICD-10-CM

## 2023-07-12 DIAGNOSIS — F32.A DEPRESSION, UNSPECIFIED: ICD-10-CM

## 2023-07-12 DIAGNOSIS — N94.10 UNSPECIFIED DYSPAREUNIA: ICD-10-CM

## 2023-07-12 DIAGNOSIS — R14.2 ERUCTATION: ICD-10-CM

## 2023-07-12 DIAGNOSIS — I10 ESSENTIAL (PRIMARY) HYPERTENSION: ICD-10-CM

## 2023-07-12 DIAGNOSIS — R73.03 PREDIABETES.: ICD-10-CM

## 2023-07-12 DIAGNOSIS — Z01.818 ENCOUNTER FOR OTHER PREPROCEDURAL EXAMINATION: ICD-10-CM

## 2023-07-12 DIAGNOSIS — E66.9 OBESITY, UNSPECIFIED: ICD-10-CM

## 2023-07-12 DIAGNOSIS — D32.9 BENIGN NEOPLASM OF MENINGES, UNSPECIFIED: ICD-10-CM

## 2023-07-12 PROCEDURE — 99214 OFFICE O/P EST MOD 30 MIN: CPT

## 2023-07-12 NOTE — ASSESSMENT
[Patient Optimized for Surgery] : Patient optimized for surgery [Continue medications as is] : Continue current medications [FreeTextEntry4] : CARDIAC CLEARANCE UNDER SEPARATE COVER (ADEEL)

## 2023-07-12 NOTE — HISTORY OF PRESENT ILLNESS
[Moderate (4-6 METs)] : Moderate (4-6 METs) [FreeTextEntry1] : Craniotomy [FreeTextEntry2] : 7/18/23 [FreeTextEntry3] : Nick [FreeTextEntry4] : on metoprolol, effexor, crestor [FreeTextEntry7] : echo 8/22 nlm

## 2023-07-17 ENCOUNTER — TRANSCRIPTION ENCOUNTER (OUTPATIENT)
Age: 68
End: 2023-07-17

## 2023-07-17 NOTE — PROGRESS NOTE ADULT - SUBJECTIVE AND OBJECTIVE BOX
67 F admitted with history of headache and history of giddiness while shopping [place] [employer] [place].   No history of speech or sensory motor deficits    On examination   Moves all extremities 5/5. Pupils equally reactive and symmetrical    MRI  Right parietal Convexity meningioma (Dural based enhancing lesion) T1 iso to hypo and T2 hyper    Hb/TLC/PLT: 14.2/ 5800/ 410K  Na/K: 142/4.3; U/Cr 21/0.7  Plan: Right parietal craniotomy and tumour excision  Under Dr Filemon Gonzales  on 07/18/23 67 F admitted with history of headache and history of giddiness while shopping at [employer] mall.   No history of speech or sensory motor deficits    On examination   AOx3, FC, PERRL, EOMI, no facial   5/5 throughout, no drift  SILT  no clonus    MRI  Right parietal Convexity meningioma (Dural based enhancing lesion) T1 iso to hypo and T2 hyper    Hb/TLC/PLT: 14.2/ 5800/ 410K  Na/K: 142/4.3; U/Cr 21/0.7  Plan: Right parietal craniotomy and tumour excision  Under Dr Filemon Gonzales  on 07/18/23

## 2023-07-17 NOTE — PROGRESS NOTE ADULT - ASSESSMENT
PLAN  For OR today; likely lateral with phase reversal/mep/ssep, r craniotomy for tumor resection (likely meningioma over motor strip). Risks benefits of surgery discussed with patient. Possible small csf cleft around medial/posterior aspect of lesion.  PLAN  For OR today; likely lateral with phase reversal/mep/ssep, r craniotomy for tumor resection (likely meningioma over motor strip). Risks benefits of surgery discussed with patient. Possible small csf cleft around medial/posterior aspect of lesion.     LIZZ TORRES.

## 2023-07-18 ENCOUNTER — INPATIENT (INPATIENT)
Facility: HOSPITAL | Age: 68
LOS: 3 days | Discharge: HOME | DRG: 25 | End: 2023-07-22
Attending: NEUROLOGICAL SURGERY | Admitting: NEUROLOGICAL SURGERY
Payer: MEDICARE

## 2023-07-18 ENCOUNTER — APPOINTMENT (OUTPATIENT)
Dept: NEUROSURGERY | Facility: HOSPITAL | Age: 68
End: 2023-07-18

## 2023-07-18 ENCOUNTER — RESULT REVIEW (OUTPATIENT)
Age: 68
End: 2023-07-18

## 2023-07-18 ENCOUNTER — TRANSCRIPTION ENCOUNTER (OUTPATIENT)
Age: 68
End: 2023-07-18

## 2023-07-18 VITALS
WEIGHT: 231.04 LBS | OXYGEN SATURATION: 97 % | HEART RATE: 94 BPM | HEIGHT: 64 IN | RESPIRATION RATE: 18 BRPM | SYSTOLIC BLOOD PRESSURE: 127 MMHG | DIASTOLIC BLOOD PRESSURE: 87 MMHG | TEMPERATURE: 98 F

## 2023-07-18 DIAGNOSIS — Z98.890 OTHER SPECIFIED POSTPROCEDURAL STATES: Chronic | ICD-10-CM

## 2023-07-18 DIAGNOSIS — G93.89 OTHER SPECIFIED DISORDERS OF BRAIN: ICD-10-CM

## 2023-07-18 DIAGNOSIS — Z98.84 BARIATRIC SURGERY STATUS: Chronic | ICD-10-CM

## 2023-07-18 LAB
ALBUMIN SERPL ELPH-MCNC: 3.8 G/DL — SIGNIFICANT CHANGE UP (ref 3.3–5)
ALP SERPL-CCNC: 50 U/L — SIGNIFICANT CHANGE UP (ref 40–120)
ALT FLD-CCNC: 79 U/L — HIGH (ref 10–45)
ANION GAP SERPL CALC-SCNC: 13 MMOL/L — SIGNIFICANT CHANGE UP (ref 5–17)
ANION GAP SERPL CALC-SCNC: 16 MMOL/L — SIGNIFICANT CHANGE UP (ref 5–17)
AST SERPL-CCNC: 103 U/L — HIGH (ref 10–40)
BASE EXCESS BLDA CALC-SCNC: -3.9 MMOL/L — LOW (ref -2–3)
BASE EXCESS BLDA CALC-SCNC: -5.5 MMOL/L — LOW (ref -2–3)
BILIRUB SERPL-MCNC: 0.5 MG/DL — SIGNIFICANT CHANGE UP (ref 0.2–1.2)
BUN SERPL-MCNC: 16 MG/DL — SIGNIFICANT CHANGE UP (ref 7–23)
BUN SERPL-MCNC: 17 MG/DL — SIGNIFICANT CHANGE UP (ref 7–23)
CALCIUM SERPL-MCNC: 8.5 MG/DL — SIGNIFICANT CHANGE UP (ref 8.4–10.5)
CALCIUM SERPL-MCNC: 8.7 MG/DL — SIGNIFICANT CHANGE UP (ref 8.4–10.5)
CHLORIDE SERPL-SCNC: 101 MMOL/L — SIGNIFICANT CHANGE UP (ref 96–108)
CHLORIDE SERPL-SCNC: 103 MMOL/L — SIGNIFICANT CHANGE UP (ref 96–108)
CO2 BLDA-SCNC: 24 MMOL/L — SIGNIFICANT CHANGE UP (ref 19–24)
CO2 BLDA-SCNC: 24 MMOL/L — SIGNIFICANT CHANGE UP (ref 19–24)
CO2 SERPL-SCNC: 20 MMOL/L — LOW (ref 22–31)
CO2 SERPL-SCNC: 22 MMOL/L — SIGNIFICANT CHANGE UP (ref 22–31)
CREAT SERPL-MCNC: 0.63 MG/DL — SIGNIFICANT CHANGE UP (ref 0.5–1.3)
CREAT SERPL-MCNC: 0.63 MG/DL — SIGNIFICANT CHANGE UP (ref 0.5–1.3)
EGFR: 98 ML/MIN/1.73M2 — SIGNIFICANT CHANGE UP
EGFR: 98 ML/MIN/1.73M2 — SIGNIFICANT CHANGE UP
GLUCOSE BLDC GLUCOMTR-MCNC: 125 MG/DL — HIGH (ref 70–99)
GLUCOSE BLDC GLUCOMTR-MCNC: 130 MG/DL — HIGH (ref 70–99)
GLUCOSE BLDC GLUCOMTR-MCNC: 174 MG/DL — HIGH (ref 70–99)
GLUCOSE BLDC GLUCOMTR-MCNC: 178 MG/DL — HIGH (ref 70–99)
GLUCOSE SERPL-MCNC: 183 MG/DL — HIGH (ref 70–99)
GLUCOSE SERPL-MCNC: 199 MG/DL — HIGH (ref 70–99)
HCO3 BLDA-SCNC: 22 MMOL/L — SIGNIFICANT CHANGE UP (ref 21–28)
HCO3 BLDA-SCNC: 23 MMOL/L — SIGNIFICANT CHANGE UP (ref 21–28)
HCT VFR BLD CALC: 36.4 % — SIGNIFICANT CHANGE UP (ref 34.5–45)
HCT VFR BLD CALC: 37.4 % — SIGNIFICANT CHANGE UP (ref 34.5–45)
HGB BLD-MCNC: 11.8 G/DL — SIGNIFICANT CHANGE UP (ref 11.5–15.5)
HGB BLD-MCNC: 12.1 G/DL — SIGNIFICANT CHANGE UP (ref 11.5–15.5)
HOROWITZ INDEX BLDA+IHG-RTO: 40 — SIGNIFICANT CHANGE UP
MAGNESIUM SERPL-MCNC: 2.2 MG/DL — SIGNIFICANT CHANGE UP (ref 1.6–2.6)
MCHC RBC-ENTMCNC: 29 PG — SIGNIFICANT CHANGE UP (ref 27–34)
MCHC RBC-ENTMCNC: 29.3 PG — SIGNIFICANT CHANGE UP (ref 27–34)
MCHC RBC-ENTMCNC: 32.4 GM/DL — SIGNIFICANT CHANGE UP (ref 32–36)
MCHC RBC-ENTMCNC: 32.4 GM/DL — SIGNIFICANT CHANGE UP (ref 32–36)
MCV RBC AUTO: 89.4 FL — SIGNIFICANT CHANGE UP (ref 80–100)
MCV RBC AUTO: 90.6 FL — SIGNIFICANT CHANGE UP (ref 80–100)
NRBC # BLD: 0 /100 WBCS — SIGNIFICANT CHANGE UP (ref 0–0)
NRBC # BLD: 0 /100 WBCS — SIGNIFICANT CHANGE UP (ref 0–0)
PCO2 BLDA: 43 MMHG — SIGNIFICANT CHANGE UP (ref 32–45)
PCO2 BLDA: 54 MMHG — HIGH (ref 32–45)
PH BLDA: 7.23 — LOW (ref 7.35–7.45)
PH BLDA: 7.32 — LOW (ref 7.35–7.45)
PHOSPHATE SERPL-MCNC: 4.6 MG/DL — HIGH (ref 2.5–4.5)
PLATELET # BLD AUTO: 177 K/UL — SIGNIFICANT CHANGE UP (ref 150–400)
PLATELET # BLD AUTO: 182 K/UL — SIGNIFICANT CHANGE UP (ref 150–400)
PO2 BLDA: 120 MMHG — HIGH (ref 83–108)
PO2 BLDA: 83 MMHG — SIGNIFICANT CHANGE UP (ref 83–108)
POTASSIUM SERPL-MCNC: 3.9 MMOL/L — SIGNIFICANT CHANGE UP (ref 3.5–5.3)
POTASSIUM SERPL-MCNC: 4.5 MMOL/L — SIGNIFICANT CHANGE UP (ref 3.5–5.3)
POTASSIUM SERPL-SCNC: 3.9 MMOL/L — SIGNIFICANT CHANGE UP (ref 3.5–5.3)
POTASSIUM SERPL-SCNC: 4.5 MMOL/L — SIGNIFICANT CHANGE UP (ref 3.5–5.3)
PROT SERPL-MCNC: 6.1 G/DL — SIGNIFICANT CHANGE UP (ref 6–8.3)
RBC # BLD: 4.07 M/UL — SIGNIFICANT CHANGE UP (ref 3.8–5.2)
RBC # BLD: 4.13 M/UL — SIGNIFICANT CHANGE UP (ref 3.8–5.2)
RBC # FLD: 12.9 % — SIGNIFICANT CHANGE UP (ref 10.3–14.5)
RBC # FLD: 13 % — SIGNIFICANT CHANGE UP (ref 10.3–14.5)
SAO2 % BLDA: 96.5 % — SIGNIFICANT CHANGE UP (ref 94–98)
SAO2 % BLDA: 99 % — HIGH (ref 94–98)
SODIUM SERPL-SCNC: 137 MMOL/L — SIGNIFICANT CHANGE UP (ref 135–145)
SODIUM SERPL-SCNC: 138 MMOL/L — SIGNIFICANT CHANGE UP (ref 135–145)
WBC # BLD: 6.27 K/UL — SIGNIFICANT CHANGE UP (ref 3.8–10.5)
WBC # BLD: 8.07 K/UL — SIGNIFICANT CHANGE UP (ref 3.8–10.5)
WBC # FLD AUTO: 6.27 K/UL — SIGNIFICANT CHANGE UP (ref 3.8–10.5)
WBC # FLD AUTO: 8.07 K/UL — SIGNIFICANT CHANGE UP (ref 3.8–10.5)

## 2023-07-18 PROCEDURE — 88307 TISSUE EXAM BY PATHOLOGIST: CPT | Mod: 26

## 2023-07-18 PROCEDURE — 88342 IMHCHEM/IMCYTCHM 1ST ANTB: CPT | Mod: 26,59

## 2023-07-18 PROCEDURE — 61512 CRNEC TREPH EXC MNGIOMA STTL: CPT

## 2023-07-18 PROCEDURE — 61781 SCAN PROC CRANIAL INTRA: CPT

## 2023-07-18 PROCEDURE — 88360 TUMOR IMMUNOHISTOCHEM/MANUAL: CPT | Mod: 26

## 2023-07-18 DEVICE — KIT A-LINE 1LUM 20G X 12CM SAFE KIT: Type: IMPLANTABLE DEVICE | Site: RIGHT | Status: FUNCTIONAL

## 2023-07-18 DEVICE — PLATE BONE MICRO 10MM 2H: Type: IMPLANTABLE DEVICE | Site: RIGHT | Status: FUNCTIONAL

## 2023-07-18 DEVICE — DVC ADHERUS AUTOSPRAY W/ DURAL SEALANT EXT TIP: Type: IMPLANTABLE DEVICE | Site: RIGHT | Status: FUNCTIONAL

## 2023-07-18 DEVICE — SURGIFOAM PAD 8CM X 12.5CM X 10MM (100): Type: IMPLANTABLE DEVICE | Site: RIGHT | Status: FUNCTIONAL

## 2023-07-18 DEVICE — SURGICEL FIBRILLAR 2 X 4": Type: IMPLANTABLE DEVICE | Site: RIGHT | Status: FUNCTIONAL

## 2023-07-18 DEVICE — SCREW UN3 AXS SELF DRILL 1.5X4MM: Type: IMPLANTABLE DEVICE | Site: RIGHT | Status: FUNCTIONAL

## 2023-07-18 DEVICE — SURGIFLO MATRIX WITH THROMBIN KIT: Type: IMPLANTABLE DEVICE | Site: RIGHT | Status: FUNCTIONAL

## 2023-07-18 DEVICE — PLATE COVER BURRHOLE UN3 W/TAB 14MM: Type: IMPLANTABLE DEVICE | Site: RIGHT | Status: FUNCTIONAL

## 2023-07-18 RX ORDER — CEFAZOLIN SODIUM 1 G
2000 VIAL (EA) INJECTION ONCE
Refills: 0 | Status: COMPLETED | OUTPATIENT
Start: 2023-07-18 | End: 2023-07-18

## 2023-07-18 RX ORDER — OXYCODONE HYDROCHLORIDE 5 MG/1
10 TABLET ORAL EVERY 4 HOURS
Refills: 0 | Status: DISCONTINUED | OUTPATIENT
Start: 2023-07-18 | End: 2023-07-22

## 2023-07-18 RX ORDER — ATORVASTATIN CALCIUM 80 MG/1
80 TABLET, FILM COATED ORAL AT BEDTIME
Refills: 0 | Status: DISCONTINUED | OUTPATIENT
Start: 2023-07-18 | End: 2023-07-22

## 2023-07-18 RX ORDER — ACETAMINOPHEN 500 MG
1000 TABLET ORAL ONCE
Refills: 0 | Status: COMPLETED | OUTPATIENT
Start: 2023-07-18 | End: 2023-07-18

## 2023-07-18 RX ORDER — LIDOCAINE HCL 20 MG/ML
0.2 VIAL (ML) INJECTION ONCE
Refills: 0 | Status: DISCONTINUED | OUTPATIENT
Start: 2023-07-18 | End: 2023-07-18

## 2023-07-18 RX ORDER — SENNA PLUS 8.6 MG/1
2 TABLET ORAL AT BEDTIME
Refills: 0 | Status: DISCONTINUED | OUTPATIENT
Start: 2023-07-18 | End: 2023-07-22

## 2023-07-18 RX ORDER — NAFCILLIN 10 G/100ML
2 INJECTION, POWDER, FOR SOLUTION INTRAVENOUS EVERY 4 HOURS
Refills: 0 | Status: COMPLETED | OUTPATIENT
Start: 2023-07-18 | End: 2023-07-19

## 2023-07-18 RX ORDER — INSULIN LISPRO 100/ML
VIAL (ML) SUBCUTANEOUS EVERY 6 HOURS
Refills: 0 | Status: DISCONTINUED | OUTPATIENT
Start: 2023-07-18 | End: 2023-07-20

## 2023-07-18 RX ORDER — LEVETIRACETAM 250 MG/1
1000 TABLET, FILM COATED ORAL EVERY 12 HOURS
Refills: 0 | Status: DISCONTINUED | OUTPATIENT
Start: 2023-07-18 | End: 2023-07-18

## 2023-07-18 RX ORDER — PANTOPRAZOLE SODIUM 20 MG/1
40 TABLET, DELAYED RELEASE ORAL DAILY
Refills: 0 | Status: DISCONTINUED | OUTPATIENT
Start: 2023-07-18 | End: 2023-07-22

## 2023-07-18 RX ORDER — FENTANYL CITRATE 50 UG/ML
25 INJECTION INTRAVENOUS
Refills: 0 | Status: DISCONTINUED | OUTPATIENT
Start: 2023-07-18 | End: 2023-07-19

## 2023-07-18 RX ORDER — DEXAMETHASONE 0.5 MG/5ML
4 ELIXIR ORAL EVERY 6 HOURS
Refills: 0 | Status: DISCONTINUED | OUTPATIENT
Start: 2023-07-18 | End: 2023-07-18

## 2023-07-18 RX ORDER — LISINOPRIL 2.5 MG/1
10 TABLET ORAL DAILY
Refills: 0 | Status: DISCONTINUED | OUTPATIENT
Start: 2023-07-18 | End: 2023-07-22

## 2023-07-18 RX ORDER — SODIUM CHLORIDE 9 MG/ML
1000 INJECTION INTRAMUSCULAR; INTRAVENOUS; SUBCUTANEOUS
Refills: 0 | Status: DISCONTINUED | OUTPATIENT
Start: 2023-07-18 | End: 2023-07-20

## 2023-07-18 RX ORDER — OXYCODONE HYDROCHLORIDE 5 MG/1
5 TABLET ORAL ONCE
Refills: 0 | Status: DISCONTINUED | OUTPATIENT
Start: 2023-07-18 | End: 2023-07-18

## 2023-07-18 RX ORDER — LEVETIRACETAM 250 MG/1
1000 TABLET, FILM COATED ORAL EVERY 12 HOURS
Refills: 0 | Status: DISCONTINUED | OUTPATIENT
Start: 2023-07-18 | End: 2023-07-22

## 2023-07-18 RX ORDER — DEXAMETHASONE 0.5 MG/5ML
4 ELIXIR ORAL EVERY 6 HOURS
Refills: 0 | Status: DISCONTINUED | OUTPATIENT
Start: 2023-07-18 | End: 2023-07-20

## 2023-07-18 RX ORDER — FENTANYL CITRATE 50 UG/ML
50 INJECTION INTRAVENOUS
Refills: 0 | Status: DISCONTINUED | OUTPATIENT
Start: 2023-07-18 | End: 2023-07-19

## 2023-07-18 RX ORDER — SODIUM CHLORIDE 9 MG/ML
3 INJECTION INTRAMUSCULAR; INTRAVENOUS; SUBCUTANEOUS EVERY 8 HOURS
Refills: 0 | Status: DISCONTINUED | OUTPATIENT
Start: 2023-07-18 | End: 2023-07-18

## 2023-07-18 RX ORDER — ONDANSETRON 8 MG/1
4 TABLET, FILM COATED ORAL ONCE
Refills: 0 | Status: DISCONTINUED | OUTPATIENT
Start: 2023-07-18 | End: 2023-07-19

## 2023-07-18 RX ORDER — ACETAMINOPHEN 500 MG
650 TABLET ORAL EVERY 6 HOURS
Refills: 0 | Status: DISCONTINUED | OUTPATIENT
Start: 2023-07-18 | End: 2023-07-22

## 2023-07-18 RX ORDER — SODIUM CHLORIDE 9 MG/ML
500 INJECTION INTRAMUSCULAR; INTRAVENOUS; SUBCUTANEOUS ONCE
Refills: 0 | Status: COMPLETED | OUTPATIENT
Start: 2023-07-18 | End: 2023-07-18

## 2023-07-18 RX ORDER — OXYCODONE HYDROCHLORIDE 5 MG/1
5 TABLET ORAL EVERY 4 HOURS
Refills: 0 | Status: DISCONTINUED | OUTPATIENT
Start: 2023-07-18 | End: 2023-07-22

## 2023-07-18 RX ORDER — ONDANSETRON 8 MG/1
4 TABLET, FILM COATED ORAL EVERY 6 HOURS
Refills: 0 | Status: DISCONTINUED | OUTPATIENT
Start: 2023-07-18 | End: 2023-07-22

## 2023-07-18 RX ADMIN — SODIUM CHLORIDE 75 MILLILITER(S): 9 INJECTION INTRAMUSCULAR; INTRAVENOUS; SUBCUTANEOUS at 21:10

## 2023-07-18 RX ADMIN — ATORVASTATIN CALCIUM 80 MILLIGRAM(S): 80 TABLET, FILM COATED ORAL at 20:54

## 2023-07-18 RX ADMIN — NAFCILLIN 200 GRAM(S): 10 INJECTION, POWDER, FOR SOLUTION INTRAVENOUS at 21:36

## 2023-07-18 RX ADMIN — NAFCILLIN 200 GRAM(S): 10 INJECTION, POWDER, FOR SOLUTION INTRAVENOUS at 17:17

## 2023-07-18 RX ADMIN — Medication 1000 MILLIGRAM(S): at 21:30

## 2023-07-18 RX ADMIN — SENNA PLUS 2 TABLET(S): 8.6 TABLET ORAL at 20:54

## 2023-07-18 RX ADMIN — LEVETIRACETAM 400 MILLIGRAM(S): 250 TABLET, FILM COATED ORAL at 20:09

## 2023-07-18 RX ADMIN — Medication 4 MILLIGRAM(S): at 17:22

## 2023-07-18 RX ADMIN — SODIUM CHLORIDE 1000 MILLILITER(S): 9 INJECTION INTRAMUSCULAR; INTRAVENOUS; SUBCUTANEOUS at 21:00

## 2023-07-18 RX ADMIN — Medication 2: at 17:31

## 2023-07-18 RX ADMIN — OXYCODONE HYDROCHLORIDE 5 MILLIGRAM(S): 5 TABLET ORAL at 20:30

## 2023-07-18 RX ADMIN — SODIUM CHLORIDE 75 MILLILITER(S): 9 INJECTION INTRAMUSCULAR; INTRAVENOUS; SUBCUTANEOUS at 17:17

## 2023-07-18 RX ADMIN — Medication 400 MILLIGRAM(S): at 21:00

## 2023-07-18 RX ADMIN — OXYCODONE HYDROCHLORIDE 5 MILLIGRAM(S): 5 TABLET ORAL at 19:04

## 2023-07-18 RX ADMIN — OXYCODONE HYDROCHLORIDE 5 MILLIGRAM(S): 5 TABLET ORAL at 20:00

## 2023-07-18 RX ADMIN — SODIUM CHLORIDE 75 MILLILITER(S): 9 INJECTION INTRAMUSCULAR; INTRAVENOUS; SUBCUTANEOUS at 16:04

## 2023-07-18 RX ADMIN — OXYCODONE HYDROCHLORIDE 5 MILLIGRAM(S): 5 TABLET ORAL at 19:01

## 2023-07-18 RX ADMIN — Medication 400 MILLIGRAM(S): at 16:16

## 2023-07-18 RX ADMIN — Medication 1000 MILLIGRAM(S): at 16:16

## 2023-07-18 NOTE — PROGRESS NOTE ADULT - ASSESSMENT
ASSESSMENT/PLAN:   POD1 - resection of convexity brain tumor    NEURO:  CT Head in PACU if the patient is not following commands  MRI in 48 hours  Surgical drains per NSGY  tylenol and oxycodone PRN   Activity: [] OOB as tolerated [] Bedrest [X] PT [X] OT [] PMNR    PULM:  Incentive spirometry, mobilize as tolerated    CV:  -160mmHg  d/c a-line in AM    RENAL:  IVF until good PO intake  d/c gutierrez in AM    GI:  Diet: advance diet as tolerated when fully awake  sennna and miralax for bowel regimen  Last Bowel Movement:   GI prophylaxis [] not indicated [] PPI [] other:    ENDO:   Goal euglycemia (-180)    HEME/ONC:  VTE prophylaxis: [X] SCDs [] chemoprophylaxis [] hold chemoprophylaxis due to: [] high risk of DVT/PE on admission due to:    ID:  Kaleigh-op antibiotics

## 2023-07-18 NOTE — PROGRESS NOTE ADULT - SUBJECTIVE AND OBJECTIVE BOX
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    24 hour Events: Allergies    No Known Allergies    Intolerances    REVIEW OF SYSTEMS: [X] Unable to Assess due to neurologic exam   [ ] All ROS addressed below are non-contributory, except:  Neuro: [ ] Headache [ ] Back pain [ ] Numbness [ ] Weakness [ ] Ataxia [ ] Dizziness [ ] Aphasia [ ] Dysarthria [ ] Visual disturbance  Resp: [ ] Shortness of breath/dyspnea, [ ] Orthopnea [ ] Cough  CV: [ ] Chest pain [ ] Palpitation [ ] Lightheadedness [ ] Syncope  Renal: [ ] Thirst [ ] Edema  GI: [ ] Nausea [ ] Emesis [ ] Abdominal pain [ ] Constipation [ ] Diarrhea  Hem: [ ] Hematemesis [ ] bright red blood per rectum  ID: [ ] Fever [ ] Chills [ ] Dysuria  ENT: [ ] Rhinorrhea      DEVICES:   [ ] Restraints [ ] ET tube [ ] central line [ ] arterial line [ ] gutierrez [ ] NGT/OGT [ ] EVD [ ] LD [ ] ROSETTA/HMV [ ] Trach [ ] PEG [ ] Chest Tube     VITALS:   Vital Signs Last 24 Hrs  T(C): 36.2 (18 Jul 2023 14:44), Max: 36.5 (18 Jul 2023 06:55)  T(F): 97.2 (18 Jul 2023 14:44), Max: 97.7 (18 Jul 2023 06:55)  HR: 102 (18 Jul 2023 17:30) (89 - 102)  BP: 122/81 (18 Jul 2023 17:30) (99/55 - 127/87)  BP(mean): 96 (18 Jul 2023 17:30) (73 - 96)  RR: 16 (18 Jul 2023 17:30) (14 - 18)  SpO2: 100% (18 Jul 2023 17:30) (91% - 100%)    Parameters below as of 18 Jul 2023 18:00  Patient On (Oxygen Delivery Method): nasal cannula  O2 Flow (L/min): 3    CAPILLARY BLOOD GLUCOSE      POCT Blood Glucose.: 174 mg/dL (18 Jul 2023 17:29)  POCT Blood Glucose.: 178 mg/dL (18 Jul 2023 15:51)  POCT Blood Glucose.: 125 mg/dL (18 Jul 2023 06:47)    I&O's Summary    18 Jul 2023 07:01  -  18 Jul 2023 18:01  --------------------------------------------------------  IN: 475 mL / OUT: 575 mL / NET: -100 mL        Respiratory:    ABG - ( 18 Jul 2023 17:12 )  pH, Arterial: 7.32  pH, Blood: x     /  pCO2: 43    /  pO2: 120   / HCO3: 22    / Base Excess: -3.9  /  SaO2: 99.0      LABS:                        12.1   6.27  )-----------( 182      ( 18 Jul 2023 15:07 )             37.4     07-18    137  |  101  |  17  ----------------------------<  199<H>  3.9   |  20<L>  |  0.63      MEDICATION LEVELS:     IVF FLUIDS/MEDICATIONS:   MEDICATIONS  (STANDING):  atorvastatin 80 milliGRAM(s) Oral at bedtime  chlorhexidine 2% Cloths 1 Application(s) Topical once  dexAMETHasone  Injectable 4 milliGRAM(s) IV Push every 6 hours  insulin lispro (ADMELOG) corrective regimen sliding scale   SubCutaneous every 6 hours  levETIRAcetam  IVPB 1000 milliGRAM(s) IV Intermittent every 12 hours  lisinopril 10 milliGRAM(s) Oral daily  nafcillin  IVPB 2 Gram(s) IV Intermittent every 4 hours  pantoprazole  Injectable 40 milliGRAM(s) IV Push daily  senna 2 Tablet(s) Oral at bedtime  sodium chloride 0.9%. 1000 milliLiter(s) (75 mL/Hr) IV Continuous <Continuous>    MEDICATIONS  (PRN):  acetaminophen     Tablet .. 650 milliGRAM(s) Oral every 6 hours PRN Temp greater or equal to 38C (100.4F), Mild Pain (1 - 3)  fentaNYL    Injectable 25 MICROGram(s) IV Push every 5 minutes PRN Moderate Pain (4 - 6)  fentaNYL    Injectable 50 MICROGram(s) IV Push every 10 minutes PRN Severe Pain (7 - 10)  ondansetron Injectable 4 milliGRAM(s) IV Push every 6 hours PRN Nausea and/or Vomiting  ondansetron Injectable 4 milliGRAM(s) IV Push once PRN Nausea and/or Vomiting  oxyCODONE    IR 5 milliGRAM(s) Oral every 4 hours PRN Moderate Pain (4 - 6)  oxyCODONE    IR 10 milliGRAM(s) Oral every 4 hours PRN Severe Pain (7 - 10)    EXAMINATION:  PHYSICAL EXAM:    Constitutional: No Acute Distress     Neurological: Awake, not following Commands, PERRL, EOMI, No Gaze Preference, Face Symmetrical, Speech Fluent.  localizes to pain. moves all 4 ext spontaneously                                            Sensation: [X] intact to light touch  [ ] decreased:     Reflexes: Deep Tendon Reflexes Intact     Pulmonary: Clear to Auscultation, No rales, No rhonchi, No wheezes     Cardiovascular: S1, S2, Regular rate and rhythm     Gastrointestinal: Soft, Non-tender, Non-distended     Extremities: No calf tenderness     Incision: clean and covered

## 2023-07-18 NOTE — PATIENT PROFILE ADULT - FALL HARM RISK - UNIVERSAL INTERVENTIONS
Bed in lowest position, wheels locked, appropriate side rails in place/Call bell, personal items and telephone in reach/Instruct patient to call for assistance before getting out of bed or chair/Non-slip footwear when patient is out of bed/[place] to call system/Physically safe environment - no spills, clutter or unnecessary equipment/Purposeful Proactive Rounding/Room/bathroom lighting operational, light cord in reach

## 2023-07-19 LAB
ANION GAP SERPL CALC-SCNC: 12 MMOL/L — SIGNIFICANT CHANGE UP (ref 5–17)
BUN SERPL-MCNC: 12 MG/DL — SIGNIFICANT CHANGE UP (ref 7–23)
CALCIUM SERPL-MCNC: 8.1 MG/DL — LOW (ref 8.4–10.5)
CHLORIDE SERPL-SCNC: 108 MMOL/L — SIGNIFICANT CHANGE UP (ref 96–108)
CO2 SERPL-SCNC: 21 MMOL/L — LOW (ref 22–31)
CREAT SERPL-MCNC: 0.53 MG/DL — SIGNIFICANT CHANGE UP (ref 0.5–1.3)
EGFR: 103 ML/MIN/1.73M2 — SIGNIFICANT CHANGE UP
GLUCOSE BLDC GLUCOMTR-MCNC: 160 MG/DL — HIGH (ref 70–99)
GLUCOSE BLDC GLUCOMTR-MCNC: 162 MG/DL — HIGH (ref 70–99)
GLUCOSE BLDC GLUCOMTR-MCNC: 176 MG/DL — HIGH (ref 70–99)
GLUCOSE SERPL-MCNC: 169 MG/DL — HIGH (ref 70–99)
HCT VFR BLD CALC: 32.3 % — LOW (ref 34.5–45)
HGB BLD-MCNC: 10.7 G/DL — LOW (ref 11.5–15.5)
MAGNESIUM SERPL-MCNC: 2.1 MG/DL — SIGNIFICANT CHANGE UP (ref 1.6–2.6)
MCHC RBC-ENTMCNC: 29.5 PG — SIGNIFICANT CHANGE UP (ref 27–34)
MCHC RBC-ENTMCNC: 33.1 GM/DL — SIGNIFICANT CHANGE UP (ref 32–36)
MCV RBC AUTO: 89 FL — SIGNIFICANT CHANGE UP (ref 80–100)
NRBC # BLD: 0 /100 WBCS — SIGNIFICANT CHANGE UP (ref 0–0)
PHOSPHATE SERPL-MCNC: 4.4 MG/DL — SIGNIFICANT CHANGE UP (ref 2.5–4.5)
PLATELET # BLD AUTO: 152 K/UL — SIGNIFICANT CHANGE UP (ref 150–400)
POTASSIUM SERPL-MCNC: 4 MMOL/L — SIGNIFICANT CHANGE UP (ref 3.5–5.3)
POTASSIUM SERPL-SCNC: 4 MMOL/L — SIGNIFICANT CHANGE UP (ref 3.5–5.3)
RBC # BLD: 3.63 M/UL — LOW (ref 3.8–5.2)
RBC # FLD: 12.9 % — SIGNIFICANT CHANGE UP (ref 10.3–14.5)
SODIUM SERPL-SCNC: 141 MMOL/L — SIGNIFICANT CHANGE UP (ref 135–145)
TSH SERPL-MCNC: 0.5 UIU/ML — SIGNIFICANT CHANGE UP (ref 0.27–4.2)
WBC # BLD: 9.27 K/UL — SIGNIFICANT CHANGE UP (ref 3.8–10.5)
WBC # FLD AUTO: 9.27 K/UL — SIGNIFICANT CHANGE UP (ref 3.8–10.5)

## 2023-07-19 PROCEDURE — 70450 CT HEAD/BRAIN W/O DYE: CPT | Mod: 26

## 2023-07-19 PROCEDURE — 93970 EXTREMITY STUDY: CPT | Mod: 26

## 2023-07-19 PROCEDURE — 99233 SBSQ HOSP IP/OBS HIGH 50: CPT

## 2023-07-19 RX ORDER — ENOXAPARIN SODIUM 100 MG/ML
30 INJECTION SUBCUTANEOUS
Refills: 0 | Status: DISCONTINUED | OUTPATIENT
Start: 2023-07-19 | End: 2023-07-22

## 2023-07-19 RX ORDER — SODIUM CHLORIDE 9 MG/ML
500 INJECTION INTRAMUSCULAR; INTRAVENOUS; SUBCUTANEOUS ONCE
Refills: 0 | Status: COMPLETED | OUTPATIENT
Start: 2023-07-19 | End: 2023-07-19

## 2023-07-19 RX ADMIN — Medication 4 MILLIGRAM(S): at 12:03

## 2023-07-19 RX ADMIN — Medication 650 MILLIGRAM(S): at 11:29

## 2023-07-19 RX ADMIN — SODIUM CHLORIDE 75 MILLILITER(S): 9 INJECTION INTRAMUSCULAR; INTRAVENOUS; SUBCUTANEOUS at 00:25

## 2023-07-19 RX ADMIN — NAFCILLIN 200 GRAM(S): 10 INJECTION, POWDER, FOR SOLUTION INTRAVENOUS at 10:27

## 2023-07-19 RX ADMIN — Medication 2: at 07:05

## 2023-07-19 RX ADMIN — Medication 4 MILLIGRAM(S): at 00:11

## 2023-07-19 RX ADMIN — NAFCILLIN 200 GRAM(S): 10 INJECTION, POWDER, FOR SOLUTION INTRAVENOUS at 02:13

## 2023-07-19 RX ADMIN — ENOXAPARIN SODIUM 30 MILLIGRAM(S): 100 INJECTION SUBCUTANEOUS at 21:16

## 2023-07-19 RX ADMIN — Medication 2: at 12:25

## 2023-07-19 RX ADMIN — OXYCODONE HYDROCHLORIDE 5 MILLIGRAM(S): 5 TABLET ORAL at 05:49

## 2023-07-19 RX ADMIN — Medication 650 MILLIGRAM(S): at 12:00

## 2023-07-19 RX ADMIN — OXYCODONE HYDROCHLORIDE 5 MILLIGRAM(S): 5 TABLET ORAL at 17:56

## 2023-07-19 RX ADMIN — OXYCODONE HYDROCHLORIDE 5 MILLIGRAM(S): 5 TABLET ORAL at 06:15

## 2023-07-19 RX ADMIN — Medication 0: at 00:10

## 2023-07-19 RX ADMIN — SODIUM CHLORIDE 75 MILLILITER(S): 9 INJECTION INTRAMUSCULAR; INTRAVENOUS; SUBCUTANEOUS at 22:38

## 2023-07-19 RX ADMIN — LEVETIRACETAM 400 MILLIGRAM(S): 250 TABLET, FILM COATED ORAL at 09:24

## 2023-07-19 RX ADMIN — Medication 4 MILLIGRAM(S): at 17:18

## 2023-07-19 RX ADMIN — SENNA PLUS 2 TABLET(S): 8.6 TABLET ORAL at 21:17

## 2023-07-19 RX ADMIN — NAFCILLIN 200 GRAM(S): 10 INJECTION, POWDER, FOR SOLUTION INTRAVENOUS at 06:13

## 2023-07-19 RX ADMIN — Medication 4 MILLIGRAM(S): at 05:49

## 2023-07-19 RX ADMIN — PANTOPRAZOLE SODIUM 40 MILLIGRAM(S): 20 TABLET, DELAYED RELEASE ORAL at 12:03

## 2023-07-19 RX ADMIN — SODIUM CHLORIDE 1000 MILLILITER(S): 9 INJECTION INTRAMUSCULAR; INTRAVENOUS; SUBCUTANEOUS at 00:26

## 2023-07-19 RX ADMIN — Medication 2: at 16:24

## 2023-07-19 RX ADMIN — SODIUM CHLORIDE 75 MILLILITER(S): 9 INJECTION INTRAMUSCULAR; INTRAVENOUS; SUBCUTANEOUS at 05:49

## 2023-07-19 RX ADMIN — OXYCODONE HYDROCHLORIDE 5 MILLIGRAM(S): 5 TABLET ORAL at 17:24

## 2023-07-19 RX ADMIN — ATORVASTATIN CALCIUM 80 MILLIGRAM(S): 80 TABLET, FILM COATED ORAL at 21:19

## 2023-07-19 RX ADMIN — LEVETIRACETAM 400 MILLIGRAM(S): 250 TABLET, FILM COATED ORAL at 21:16

## 2023-07-19 RX ADMIN — NAFCILLIN 200 GRAM(S): 10 INJECTION, POWDER, FOR SOLUTION INTRAVENOUS at 14:22

## 2023-07-19 NOTE — PHYSICAL THERAPY INITIAL EVALUATION ADULT - PLANNED THERAPY INTERVENTIONS, PT EVAL
GOAL: Pt will negotiate 10 steps with 1 hand rail and step to pattern independently in 4 weeks./balance training/bed mobility training/gait training/transfer training

## 2023-07-19 NOTE — PHYSICAL THERAPY INITIAL EVALUATION ADULT - ADDITIONAL COMMENTS
pt lives in private home with , 3 steps to enter. Pt stays on the first floor. Prior to admission, pt was I with all functional mobility and ADLs without AD. R handed.

## 2023-07-19 NOTE — PROGRESS NOTE ADULT - ASSESSMENT
CT Brain in AM  MRI w/wo before discharge  No EEG unless has another seizure  Brachial lacy, d/c in AM with gutierrez  LED-p  PT, dispo planning

## 2023-07-19 NOTE — OCCUPATIONAL THERAPY INITIAL EVALUATION ADULT - PERTINENT HX OF CURRENT PROBLEM, REHAB EVAL
66 yo female, PMH HTN, HLD, depression, pre-diabetic, fatty liver, morbid obesity s/p lap band 2013, lost weight and gained it back - recent visit to bariatric GI - unable to deflate due to resistance on accessing port (note in chart) Pt. reports that in 2011 she fainted while in [employer], taken to [facility] and CT scan revealed a cerebral tumor, which was later diagnosed as a meningioma - she has been under observation and it has grown in size. Pt. presents to PST for scheduled Right Craniotomy for Tumor on 7/18/23.

## 2023-07-19 NOTE — OCCUPATIONAL THERAPY INITIAL EVALUATION ADULT - DIAGNOSIS, OT EVAL
Pt demonstrated decreased strength, balance, ROM, vision impacting pt's ability to participate in functional mobility and ADLs.

## 2023-07-19 NOTE — PROGRESS NOTE ADULT - SUBJECTIVE AND OBJECTIVE BOX
Patient seen and examined at bedside POD 1 meningioma resection w/ intraoperative seizure. Loaded with keppra. No postoperative clinical seizures or concerns for subclinical seizures.     --Anticoagulation--    T(C): 36.5 (07-19-23 @ 00:00), Max: 36.5 (07-18-23 @ 06:55)  HR: 95 (07-19-23 @ 03:00) (89 - 106)  BP: 109/60 (07-19-23 @ 03:00) (99/55 - 127/87)  RR: 16 (07-19-23 @ 03:00) (14 - 18)  SpO2: 97% (07-19-23 @ 03:00) (91% - 100%)  Wt(kg): --    Exam: AOX3, eomi, VFF, perrl, ANGELES 5/5 no drift, SILT    .  LABS:                         11.8   8.07  )-----------( 177      ( 18 Jul 2023 17:51 )             36.4     07-18    138  |  103  |  16  ----------------------------<  183<H>  4.5   |  22  |  0.63    Ca    8.5      18 Jul 2023 17:51  Phos  4.6     07-18  Mg     2.2     07-18    TPro  6.1  /  Alb  3.8  /  TBili  0.5  /  DBili  x   /  AST  103<H>  /  ALT  79<H>  /  AlkPhos  50  07-18      Urinalysis Basic - ( 18 Jul 2023 17:51 )    Color: x / Appearance: x / SG: x / pH: x  Gluc: 183 mg/dL / Ketone: x  / Bili: x / Urobili: x   Blood: x / Protein: x / Nitrite: x   Leuk Esterase: x / RBC: x / WBC x   Sq Epi: x / Non Sq Epi: x / Bacteria: x            RADIOLOGY, EKG & ADDITIONAL TESTS: Reviewed.

## 2023-07-19 NOTE — PROGRESS NOTE ADULT - ASSESSMENT
ASSESSMENT/PLAN:   POD1 - resection of convexity brain tumor    NEURO:  CT Head pending   MRI in 48 hours  tylenol and oxycodone PRN   Activity: [] OOB as tolerated [] Bedrest [X] PT [X] OT [] PMNR    PULM:  Incentive spirometry, mobilize as tolerated    CV:  -160mmHg  d/c a-line     RENAL:  IVF until good PO intake  d/c gutierrez    GI:  Diet: advance diet as tolerated   sennna and miralax for bowel regimen  Last Bowel Movement:   GI prophylaxis [X] not indicated [] PPI [] other:    ENDO:   Goal euglycemia (-180)    HEME/ONC:  VTE prophylaxis: [X] SCDs [] chemoprophylaxis [] hold chemoprophylaxis due to: [] high risk of DVT/PE on admission due to:    Dispo: patient hemodynamically stable if CT Head stable can be down graded to floor.    Case D/w Dr. Recinos and nsx team.       ID:  Kaleigh-op antibiotics

## 2023-07-19 NOTE — PHYSICAL THERAPY INITIAL EVALUATION ADULT - NSPTDISCHREC_GEN_A_CORE
pt prefers home. If DC to home, pt would require home PT to inc strength and balance; assist rec for all mobility./Acute Inpatient Rehab

## 2023-07-19 NOTE — CONSULT NOTE ADULT - ATTENDING COMMENTS
HPI as per resident note, personally verified by me. Patient with R parietal meningioma resection and seizure intra-operatively. Never had a seizure before or after this. Feels well and denies any focal neurologic deficits.    Neurologic exam as per resident note with additions as below:  AAO x3, speech fluent  CN's II-XII intact with L rotatory on upgaze and L horizontal end gaze fatigable nystagmus  Strength 5/5 all  Sens intact all  FtN intact b/l  Neutral b/l plantar response    TSH WNL, A1C inc 6.7%    A/P:  Seizure  R parietal meningioma s/p resection  HTN  DM type 2  Thrombocytopenia (plt dec 126)    - Etiology for seizure likely secondary to acute event with surgery and meningioma. Unclear if she will develop epilepsy or not at this point but no further seizures and no focal neurologic deficits  - vEEG to evaluate for focal slowing, epileptiform discharges, or seizures  - Keppra 1g PO BID for at least two weeks. Will decide on exact duration and dose pending above work-up and clinical course  - Check labs for additional causes with B12, folate, Vitamin D 25 OH, B6  - Continue to address above medical and surgical issues, as you are doing  - Will continue to follow patient with you    Seizure Precautions discussed:  1. No driving for 1 year from date of last seizure as per [place] law  2. No taking a bath alone or showering with standing water > 2 inches  3. No swimming unsupervised  4. No use of automatic or semi-automatic firearms and no using other firearms unsupervised  5. No use of heavy machinery unsupervised  6. No cooking over an open flame on the front burners (back burners OK)  7. For additional recommendations please discuss with neurology or PCP as outpatient

## 2023-07-19 NOTE — PROGRESS NOTE ADULT - SUBJECTIVE AND OBJECTIVE BOX
· Subjective and Objective:   Patient is 68 yo female, PMH HTN, HLD, depression, pre-diabetic, fatty liver, morbid obesity s/p lap band 2013, lost weight and gained it back - recent visit to bariatric GI - unable to deflate due to resistance on accessing port (note in chart) Pt. reports that in 2011 she fainted while in [employer], taken to [facility] and CT scan revealed a cerebral tumor, which was later diagnosed as a meningioma - she has been under observation and it has grown in size. Pt. presents to PST for scheduled Right Craniotomy for Tumor on 7/18/23. Pt. reports SCOTT which is unchanged, does not have an active lifestyle Denies dizziness, vision changes, headaches, recent fever, chills, chest pain, SOB, changes in bowel/urinary habits or recent exposure to COVID-19 infection. Pt. denies clotting or bleeding disorders.      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 Loaded with keppra.     24 hour Events: no events     No Known Allergies    Intolerances    REVIEW OF SYSTEMS: [] Unable to Assess due to neurologic exam   [X ] All ROS addressed below are non-contributory, except:  Neuro: [ ] Headache [ ] Back pain [ ] Numbness [ ] Weakness [ ] Ataxia [ ] Dizziness [ ] Aphasia [ ] Dysarthria [ ] Visual disturbance  Resp: [ ] Shortness of breath/dyspnea, [ ] Orthopnea [ ] Cough  CV: [ ] Chest pain [ ] Palpitation [ ] Lightheadedness [ ] Syncope  Renal: [ ] Thirst [ ] Edema  GI: [ ] Nausea [ ] Emesis [ ] Abdominal pain [ ] Constipation [ ] Diarrhea  Hem: [ ] Hematemesis [ ] bright red blood per rectum  ID: [ ] Fever [ ] Chills [ ] Dysuria  ENT: [ ] Rhinorrhea      DEVICES:   [ ] Restraints [ ] ET tube [ ] central line [ ] arterial line [ ] gutierrez [ ] NGT/OGT [ ] EVD [ ] LD [ ] ROSETTA/HMV [ ] Trach [ ] PEG [ ] Chest Tube     VITALS:   ICU Vital Signs Last 24 Hrs  T(C): 36.8 (19 Jul 2023 08:00), Max: 36.8 (19 Jul 2023 08:00)  T(F): 98.3 (19 Jul 2023 08:00), Max: 98.3 (19 Jul 2023 08:00)  HR: 102 (19 Jul 2023 08:00) (89 - 106)  BP: 109/68 (19 Jul 2023 08:00) (99/55 - 125/74)  BP(mean): 84 (19 Jul 2023 08:00) (73 - 96)  ABP: 123/73 (19 Jul 2023 06:15) (98/60 - 169/69)  ABP(mean): 95 (19 Jul 2023 06:15) (75 - 128)  RR: 16 (19 Jul 2023 08:00) (14 - 18)  SpO2: 95% (19 Jul 2023 08:00) (91% - 100%)    O2 Parameters below as of 19 Jul 2023 08:00  Patient On (Oxygen Delivery Method): room air    .  LABS:                         10.7   9.27  )-----------( 152      ( 19 Jul 2023 05:49 )             32.3     07-19    141  |  108  |  12  ----------------------------<  169<H>  4.0   |  21<L>  |  0.53    Ca    8.1<L>      19 Jul 2023 05:49  Phos  4.4     07-19  Mg     2.1     07-19    TPro  6.1  /  Alb  3.8  /  TBili  0.5  /  DBili  x   /  AST  103<H>  /  ALT  79<H>  /  AlkPhos  50  07-18      Urinalysis Basic - ( 19 Jul 2023 05:49 )    Color: x / Appearance: x / SG: x / pH: x  Gluc: 169 mg/dL / Ketone: x  / Bili: x / Urobili: x   Blood: x / Protein: x / Nitrite: x   Leuk Esterase: x / RBC: x / WBC x   Sq Epi: x / Non Sq Epi: x / Bacteria: x            RADIOLOGY, EKG & ADDITIONAL TESTS: Reviewed.       MEDICATION LEVELS:     MEDICATIONS  (STANDING):  atorvastatin 80 milliGRAM(s) Oral at bedtime  chlorhexidine 2% Cloths 1 Application(s) Topical once  dexAMETHasone  Injectable 4 milliGRAM(s) IV Push every 6 hours  insulin lispro (ADMELOG) corrective regimen sliding scale   SubCutaneous every 6 hours  levETIRAcetam  IVPB 1000 milliGRAM(s) IV Intermittent every 12 hours  lisinopril 10 milliGRAM(s) Oral daily  nafcillin  IVPB 2 Gram(s) IV Intermittent every 4 hours  pantoprazole  Injectable 40 milliGRAM(s) IV Push daily  senna 2 Tablet(s) Oral at bedtime  sodium chloride 0.9%. 1000 milliLiter(s) (75 mL/Hr) IV Continuous <Continuous>    MEDICATIONS  (PRN):  acetaminophen     Tablet .. 650 milliGRAM(s) Oral every 6 hours PRN Temp greater or equal to 38C (100.4F), Mild Pain (1 - 3)  ondansetron Injectable 4 milliGRAM(s) IV Push every 6 hours PRN Nausea and/or Vomiting  ondansetron Injectable 4 milliGRAM(s) IV Push once PRN Nausea and/or Vomiting  oxyCODONE    IR 10 milliGRAM(s) Oral every 4 hours PRN Severe Pain (7 - 10)  oxyCODONE    IR 5 milliGRAM(s) Oral every 4 hours PRN Moderate Pain (4 - 6)    EXAMINATION:  PHYSICAL EXAM:    Constitutional: No Acute Distress     Neurological: Awake,  following Commands, PERRL, EOMI, No Gaze Preference, Face Symmetrical, Speech Fluent.  localizes to pain. moves all 4 ext 5/5    Sensation: [X] intact to light touch  [ ] decreased:     Reflexes: Deep Tendon Reflexes Intact     Pulmonary: Clear to Auscultation, No rales, No rhonchi, No wheezes     Cardiovascular: S1, S2, Regular rate and rhythm     Gastrointestinal: Soft, Non-tender, Non-distended     Extremities: No calf tenderness     Incision: clean and covered

## 2023-07-19 NOTE — CONSULT NOTE ADULT - ASSESSMENT
Assessment:    Impression:      Mechanism: Cardioembolic, Large artery atherosclerosis (>50%), Small vessel disease, Embolic stroke of undetermined source, Stroke of other determined etiology    Recommendations:  [ ]    [ ] MRI brain w/o con, MRA head/neck w/o contrast (vessel imaging without contrast) if not contraindicated  [ ] telemetry to check for arrhythmia, EKG, will discuss loop recorder    - Glucose control   - Neuro-checks and VS q4h  - Dysphagia screen. If fails, speech/swallow eval  - aspiration, fall precautions  - STAT CT head non-contrast for change in neuro exam.   - PT/ OT / DVT ppx per primary team     Recommendations will be finalized/amended once signed by attending.  Assessment:    Impression:      Recommendations:  [] Telemetry  [] Seizure, fall and aspiration precautions. Avoid sleep deprivation.   [] Please note that numerous antibiotics may trigger epileptic seizures or status epilepticus by decreasing inhibitory transmission in the brain, thus lowering the seizure threshold. The most potent seizurogenic effect is exerted by penicillins, cephalosporins (fourth generation), fluoroquinolones, and carbapenems.  [] Lorazepam 2 mg IV PRN for seizure activity lasting >3-5mins, >2x/hr, >3x/day, or if GTC , repeat after 1 minute (max dose 0.1 mg/kg)      [] Given concern for seizure, advise the patient with regards to risks and driving privileges associated with the [place] Guidelines. Advise patient regarding the risk of seizures and general seizure safety recommendations including not to be bathing alone, climbing to high places and operating heavy machinery, until cleared by follow-up outpatient Neurology. Reinforce the importance of compliance with medications. Discuss sleep hygiene and the risks of sleep disruption. Discuss the risk of death associated with seizures / SUDEP.    [ ] Please note: if patient has a convulsion, please document length of episode, specifically what patient was doing paying attention to eye opening vs closure, gaze deviation, shaking of extremities, tongue bite, urinary incontinence, any derangement of vital signs. Generalized motor seizures can have dilated and unreactive pupils, absent oculocephalic reflexes (no dolls eyes), and open eyelids (99% of cases). Head turning from sided to side, pelvic thrusting, bicycling movements, hand waving are NOT manifestations of generalized motor seizures.    - Glucose control   - Neuro-checks and VS q4h  - Dysphagia screen. If fails, speech/swallow eval  - aspiration, fall precautions  - STAT CT head non-contrast for change in neuro exam.   - PT/ OT / DVT ppx per primary team     Recommendations will be finalized/amended once signed by attending.  Assessment:  68 y/o F with PMHx significant for HTN, HLD, depression, pre-diabetes, fatty liver, morbid obesity s/p lap band 2013 who presented 7/17/2023 for headaches and giddiness at [employer]. On MRI, found to have progressively enlarging R parietal convexity meningioma, now s/p R craniotomy with resection with neurosurgery 7/18. Today is POD1 meningioma resection. Neurology consulted for intraop seizure s/p loading with keppra.    Impression:    POD1 meningioma resection with craniotomy, neuro consulted for intraop seizure s/p loading with keppra. No further seizure activity.    Recommendations:  [] Telemetry  [] Seizure, fall and aspiration precautions. Avoid sleep deprivation.   [] Please note that numerous antibiotics may trigger epileptic seizures or status epilepticus by decreasing inhibitory transmission in the brain, thus lowering the seizure threshold. The most potent seizurogenic effect is exerted by penicillins, cephalosporins (fourth generation), fluoroquinolones, and carbapenems.  [] Lorazepam 2 mg IV PRN for seizure activity lasting >3-5mins, >2x/hr, >3x/day, or if GTC , repeat after 1 minute (max dose 0.1 mg/kg)      [] Given concern for seizure, advise the patient with regards to risks and driving privileges associated with the [place] Guidelines. Advise patient regarding the risk of seizures and general seizure safety recommendations including not to be bathing alone, climbing to high places and operating heavy machinery, until cleared by follow-up outpatient Neurology. Reinforce the importance of compliance with medications. Discuss sleep hygiene and the risks of sleep disruption. Discuss the risk of death associated with seizures / SUDEP.    [ ] Please note: if patient has a convulsion, please document length of episode, specifically what patient was doing paying attention to eye opening vs closure, gaze deviation, shaking of extremities, tongue bite, urinary incontinence, any derangement of vital signs. Generalized motor seizures can have dilated and unreactive pupils, absent oculocephalic reflexes (no dolls eyes), and open eyelids (99% of cases). Head turning from sided to side, pelvic thrusting, bicycling movements, hand waving are NOT manifestations of generalized motor seizures.    - Glucose control   - Neuro-checks and VS q4h  - Dysphagia screen. If fails, speech/swallow eval  - aspiration, fall precautions  - STAT CT head non-contrast for change in neuro exam.   - PT/ OT / DVT ppx per primary team     Recommendations will be finalized/amended once signed by attending.  Assessment:  66 y/o F with PMHx significant for HTN, HLD, depression, pre-diabetes, fatty liver, morbid obesity s/p lap band 2013 who presented 7/17/2023 for headaches and giddiness at [employer]. On MRI, found to have progressively enlarging R parietal convexity meningioma, now s/p R craniotomy with resection with neurosurgery 7/18. Today is POD1 meningioma resection. Neurology consulted for intraop seizure s/p loading with keppra 1g.    Impression:    POD1 meningioma resection with craniotomy, neuro consulted for intraop seizure s/p loading with keppra 1g. No further seizure activity.    Recommendations:  [] Telemetry  [] continue current regimen with keppra 1g BID  [] Seizure, fall and aspiration precautions. Avoid sleep deprivation.   [] Please note that numerous antibiotics may trigger epileptic seizures or status epilepticus by decreasing inhibitory transmission in the brain, thus lowering the seizure threshold. The most potent seizurogenic effect is exerted by penicillins, cephalosporins (fourth generation), fluoroquinolones, and carbapenems.  [] Lorazepam 2 mg IV PRN for seizure activity lasting >3-5mins, >2x/hr, >3x/day, or if GTC , repeat after 1 minute (max dose 0.1 mg/kg)      [] Given concern for seizure, advise the patient with regards to risks and driving privileges associated with the [place] Guidelines. Advise patient regarding the risk of seizures and general seizure safety recommendations including not to be bathing alone, climbing to high places and operating heavy machinery, until cleared by follow-up outpatient Neurology. Reinforce the importance of compliance with medications. Discuss sleep hygiene and the risks of sleep disruption. Discuss the risk of death associated with seizures / SUDEP.    [ ] Please note: if patient has a convulsion, please document length of episode, specifically what patient was doing paying attention to eye opening vs closure, gaze deviation, shaking of extremities, tongue bite, urinary incontinence, any derangement of vital signs. Generalized motor seizures can have dilated and unreactive pupils, absent oculocephalic reflexes (no dolls eyes), and open eyelids (99% of cases). Head turning from sided to side, pelvic thrusting, bicycling movements, hand waving are NOT manifestations of generalized motor seizures.    - Glucose control   - Neuro-checks and VS q4h  - Dysphagia screen. If fails, speech/swallow eval  - aspiration, fall precautions  - STAT CT head non-contrast for change in neuro exam.   - PT/ OT / DVT ppx per primary team     Recommendations will be finalized/amended once signed by attending.  Assessment:  66 y/o F with PMHx significant for HTN, HLD, depression, pre-diabetes, fatty liver, morbid obesity s/p lap band 2013 who presented 7/17/2023 for headaches and giddiness at [employer]. On MRI, found to have progressively enlarging R parietal convexity meningioma, now s/p R craniotomy with resection with neurosurgery 7/18. Today is POD1 meningioma resection. Neurology consulted for intraop seizure s/p loading with keppra 1g.    Impression:    POD1 meningioma resection with craniotomy, neuro consulted for intraop seizure s/p loading with keppra 1g. No further seizure activity.    Recommendations:  [] Telemetry  [] continue current regimen with keppra 1g BID  [] obtain vEEG 24h  [] Seizure, fall and aspiration precautions. Avoid sleep deprivation.   [] Please note that numerous antibiotics may trigger epileptic seizures or status epilepticus by decreasing inhibitory transmission in the brain, thus lowering the seizure threshold. The most potent seizurogenic effect is exerted by penicillins, cephalosporins (fourth generation), fluoroquinolones, and carbapenems.  [] Lorazepam 2 mg IV PRN for seizure activity lasting >3-5mins, >2x/hr, >3x/day, or if GTC , repeat after 1 minute (max dose 0.1 mg/kg)      [] Given concern for seizure, advise the patient with regards to risks and driving privileges associated with the [place] Guidelines. Advise patient regarding the risk of seizures and general seizure safety recommendations including not to be bathing alone, climbing to high places and operating heavy machinery, until cleared by follow-up outpatient Neurology. Reinforce the importance of compliance with medications. Discuss sleep hygiene and the risks of sleep disruption. Discuss the risk of death associated with seizures / SUDEP.    [ ] Please note: if patient has a convulsion, please document length of episode, specifically what patient was doing paying attention to eye opening vs closure, gaze deviation, shaking of extremities, tongue bite, urinary incontinence, any derangement of vital signs. Generalized motor seizures can have dilated and unreactive pupils, absent oculocephalic reflexes (no dolls eyes), and open eyelids (99% of cases). Head turning from sided to side, pelvic thrusting, bicycling movements, hand waving are NOT manifestations of generalized motor seizures.    - Glucose control   - Neuro-checks and VS q4h  - Dysphagia screen. If fails, speech/swallow eval  - aspiration, fall precautions  - STAT CT head non-contrast for change in neuro exam.   - PT/ OT / DVT ppx per primary team     Recommendations will be finalized/amended once signed by attending.

## 2023-07-19 NOTE — OCCUPATIONAL THERAPY INITIAL EVALUATION ADULT - VISUAL ASSESSMENT: TRACKING
difficult to fully assess, pt demonstrated difficulty keeping eyes open for entire assessment, pt c/o blurry vision t/o session and diplopia with R upward gaze./normal

## 2023-07-19 NOTE — CONSULT NOTE ADULT - SUBJECTIVE AND OBJECTIVE BOX
Neurology - Consult Note    -  Spectra: [phone] ([facility]), [phone] ([facility])  -    HPI: Patient MANDO MAYO is a 67y (1955) man or woman *** with a PMHx significant for ***      Review of Systems:  INCOMPLETE   CONSTITUTIONAL: No fevers or chills  EYES AND ENT: No visual changes or no throat pain   NECK: No pain or stiffness  RESPIRATORY: No hemoptysis or shortness of breath  CARDIOVASCULAR: No chest pain or palpitations  GASTROINTESTINAL: No melena or hematochezia  GENITOURINARY: No dysuria or hematuria  NEUROLOGICAL: +As stated in HPI above  SKIN: No itching, burning, rashes, or lesions   All other review of systems is negative unless indicated above.    Allergies:  No Known Allergies      PMHx/PSHx/Family Hx: As above, otherwise see below   Sleep apnea    S/P cholecystectomy    Uterine disorder    S/P knee surgery    S/P laminectomy    Diabetes mellitus    Hypertension    Depression    Tinea versicolor    Hemangioma    H/O colonoscopy    Pre-diabetes    Meningioma        Social Hx:  No current use of tobacco, alcohol, or illicit drugs  Lives with ***    Medications:  MEDICATIONS  (STANDING):  atorvastatin 80 milliGRAM(s) Oral at bedtime  chlorhexidine 2% Cloths 1 Application(s) Topical once  dexAMETHasone  Injectable 4 milliGRAM(s) IV Push every 6 hours  enoxaparin Injectable 30 milliGRAM(s) SubCutaneous <User Schedule>  insulin lispro (ADMELOG) corrective regimen sliding scale   SubCutaneous every 6 hours  levETIRAcetam  IVPB 1000 milliGRAM(s) IV Intermittent every 12 hours  lisinopril 10 milliGRAM(s) Oral daily  pantoprazole  Injectable 40 milliGRAM(s) IV Push daily  senna 2 Tablet(s) Oral at bedtime  sodium chloride 0.9%. 1000 milliLiter(s) (75 mL/Hr) IV Continuous <Continuous>    MEDICATIONS  (PRN):  acetaminophen     Tablet .. 650 milliGRAM(s) Oral every 6 hours PRN Temp greater or equal to 38C (100.4F), Mild Pain (1 - 3)  ondansetron Injectable 4 milliGRAM(s) IV Push every 6 hours PRN Nausea and/or Vomiting  ondansetron Injectable 4 milliGRAM(s) IV Push once PRN Nausea and/or Vomiting  oxyCODONE    IR 5 milliGRAM(s) Oral every 4 hours PRN Moderate Pain (4 - 6)  oxyCODONE    IR 10 milliGRAM(s) Oral every 4 hours PRN Severe Pain (7 - 10)        Home Medications:  Altace 2.5 mg oral tablet: 1 tab(s) orally once a day (18 Jul 2023 07:28)  Crestor 20 mg oral tablet: 1 tab(s) orally once a day (18 Jul 2023 07:28)  venlafaxine 225 mg oral tablet, extended release: 1 tab(s) orally once a day (18 Jul 2023 07:28)      Vitals:  T(C): 36.4 (07-19-23 @ 12:00), Max: 36.8 (07-19-23 @ 08:00)  HR: 94 (07-19-23 @ 16:00) (92 - 126)  BP: 105/64 (07-19-23 @ 16:00) (105/64 - 125/74)  RR: 12 (07-19-23 @ 16:00) (12 - 18)  SpO2: 95% (07-19-23 @ 16:00) (91% - 100%)    Physical Examination: INCOMPLETE  General - NAD  Cardiovascular - Peripheral pulses palpable, no edema    Neurologic Exam:  Mental status - Awake, Alert, Oriented to person, place, and time. Speech fluent, repetition and naming intact. Follows simple and complex commands.     Cranial nerves - PERRL, VFF, EOMI, face sensation (V1-V3) intact LT, No facial asymmetry b/l, hearing grossly intact b/l, trapezius 5/5 strength b/l, tongue midline on protrusion     Motor - Normal bulk and tone throughout. No pronator drift.  Strength testing            Deltoid      Biceps      Triceps     Wrist Extension    Wrist Flexion       R            5                 5               5                     5                              5                        5  L             5                 5               5                     5                              5                       5              Hip Flexion    Hip Extension    Knee Flexion    Knee Extension    Dorsiflexion    Plantar Flexion  R              5                           5                       5                           5                            5                          5  L              5                           5                        5                           5                            5                          5    Sensation - Light touch/temperature intact throughout    DTR's -             Biceps      Triceps     Brachioradialis      Patellar    Ankle    Toes/plantar response  R             2+             2+                  2+                       2+            2+                 Down  L              2+             2+                 2+                        2+           2+                 Down    Coordination - Finger to Nose intact b/l. No tremors appreciated    Gait and station - Normal casual gait. Romberg (-)    Labs:                        10.7   9.27  )-----------( 152      ( 19 Jul 2023 05:49 )             32.3     07-19    141  |  108  |  12  ----------------------------<  169<H>  4.0   |  21<L>  |  0.53    Ca    8.1<L>      19 Jul 2023 05:49  Phos  4.4     07-19  Mg     2.1     07-19    TPro  6.1  /  Alb  3.8  /  TBili  0.5  /  DBili  x   /  AST  103<H>  /  ALT  79<H>  /  AlkPhos  50  07-18    CAPILLARY BLOOD GLUCOSE      POCT Blood Glucose.: 160 mg/dL (19 Jul 2023 16:20)    LIVER FUNCTIONS - ( 18 Jul 2023 15:07 )  Alb: 3.8 g/dL / Pro: 6.1 g/dL / ALK PHOS: 50 U/L / ALT: 79 U/L / AST: 103 U/L / GGT: x               CSF:                  Radiology:  CT Head No Cont:  (19 Jul 2023 14:40)     Neurology - Consult Note    -  Spectra: [phone] ([facility]), [phone] ([facility])  -    HPI:   68 y/o F with PMHx significant for HTN, HLD, depression, pre-diabetes, fatty liver, morbid obesity s/p lap band 2013 who presented 7/17/2023 for headaches and giddiness at [employer]. On MRI, found to have progressively enlarging R parietal convexity meningioma, now s/p R craniotomy with resection with neurosurgery 7/18. Today is POD1 meningioma resection. Neurology consulted for intraop seizure s/p loading with keppra. No further seizure activity.    Review of Systems:    CONSTITUTIONAL: No fevers or chills  EYES AND ENT: No visual changes or no throat pain   NECK: No pain or stiffness  RESPIRATORY: No hemoptysis or shortness of breath  CARDIOVASCULAR: No chest pain or palpitations  GASTROINTESTINAL: No melena or hematochezia  GENITOURINARY: No dysuria or hematuria  NEUROLOGICAL: +As stated in HPI above  SKIN: No itching, burning, rashes, or lesions   All other review of systems is negative unless indicated above.    Allergies:  No Known Allergies    PMHx/PSHx/Family Hx: As above, otherwise see below   Sleep apnea    S/P cholecystectomy    Uterine disorder    S/P knee surgery    S/P laminectomy    Diabetes mellitus    Hypertension    Depression    Tinea versicolor    Hemangioma    H/O colonoscopy    Pre-diabetes    Meningioma        Social Hx:  No current use of tobacco, alcohol, or illicit drugs    Medications:  MEDICATIONS  (STANDING):  atorvastatin 80 milliGRAM(s) Oral at bedtime  chlorhexidine 2% Cloths 1 Application(s) Topical once  dexAMETHasone  Injectable 4 milliGRAM(s) IV Push every 6 hours  enoxaparin Injectable 30 milliGRAM(s) SubCutaneous <User Schedule>  insulin lispro (ADMELOG) corrective regimen sliding scale   SubCutaneous every 6 hours  levETIRAcetam  IVPB 1000 milliGRAM(s) IV Intermittent every 12 hours  lisinopril 10 milliGRAM(s) Oral daily  pantoprazole  Injectable 40 milliGRAM(s) IV Push daily  senna 2 Tablet(s) Oral at bedtime  sodium chloride 0.9%. 1000 milliLiter(s) (75 mL/Hr) IV Continuous <Continuous>    MEDICATIONS  (PRN):  acetaminophen     Tablet .. 650 milliGRAM(s) Oral every 6 hours PRN Temp greater or equal to 38C (100.4F), Mild Pain (1 - 3)  ondansetron Injectable 4 milliGRAM(s) IV Push every 6 hours PRN Nausea and/or Vomiting  ondansetron Injectable 4 milliGRAM(s) IV Push once PRN Nausea and/or Vomiting  oxyCODONE    IR 5 milliGRAM(s) Oral every 4 hours PRN Moderate Pain (4 - 6)  oxyCODONE    IR 10 milliGRAM(s) Oral every 4 hours PRN Severe Pain (7 - 10)        Home Medications:  Altace 2.5 mg oral tablet: 1 tab(s) orally once a day (18 Jul 2023 07:28)  Crestor 20 mg oral tablet: 1 tab(s) orally once a day (18 Jul 2023 07:28)  venlafaxine 225 mg oral tablet, extended release: 1 tab(s) orally once a day (18 Jul 2023 07:28)      Vitals:  T(C): 36.4 (07-19-23 @ 12:00), Max: 36.8 (07-19-23 @ 08:00)  HR: 94 (07-19-23 @ 16:00) (92 - 126)  BP: 105/64 (07-19-23 @ 16:00) (105/64 - 125/74)  RR: 12 (07-19-23 @ 16:00) (12 - 18)  SpO2: 95% (07-19-23 @ 16:00) (91% - 100%)    Physical Examination:   General - NAD  Cardiovascular - Peripheral pulses palpable, no edema    Neurologic Exam:  Mental status - Awake, Alert, Oriented to person, place, and time. Speech fluent, repetition and naming intact. Follows simple and complex commands.     Cranial nerves - PERRL, VFF, EOMI, face sensation (V1-V3) intact LT, No facial asymmetry b/l, hearing grossly intact b/l, trapezius 5/5 strength b/l, tongue midline on protrusion     Motor - Normal bulk and tone throughout. No pronator drift.  Strength testing            Deltoid      Biceps      Triceps     Wrist Extension    Wrist Flexion       R            5                 5               5                     5                              5                        5  L             5                 5               5                     5                              5                       5              Hip Flexion    Hip Extension    Knee Flexion    Knee Extension    Dorsiflexion    Plantar Flexion  R              5                           5                       5                           5                            5                          5  L              5                           5                        5                           5                            5                          5    Sensation - Light touch/temperature intact throughout    DTR's -             Biceps      Triceps     Brachioradialis      Patellar    Ankle    Toes/plantar response  R             2+             2+                  2+                       2+            2+                 Down  L              2+             2+                 2+                        2+           2+                 Down    Coordination - Finger to Nose intact b/l. No tremors appreciated    Gait and station - Gait not assessed for patient safety.    Labs:                        10.7   9.27  )-----------( 152      ( 19 Jul 2023 05:49 )             32.3     07-19    141  |  108  |  12  ----------------------------<  169<H>  4.0   |  21<L>  |  0.53    Ca    8.1<L>      19 Jul 2023 05:49  Phos  4.4     07-19  Mg     2.1     07-19    TPro  6.1  /  Alb  3.8  /  TBili  0.5  /  DBili  x   /  AST  103<H>  /  ALT  79<H>  /  AlkPhos  50  07-18    CAPILLARY BLOOD GLUCOSE      POCT Blood Glucose.: 160 mg/dL (19 Jul 2023 16:20)    LIVER FUNCTIONS - ( 18 Jul 2023 15:07 )  Alb: 3.8 g/dL / Pro: 6.1 g/dL / ALK PHOS: 50 U/L / ALT: 79 U/L / AST: 103 U/L / GGT: x           Radiology:    < from: [facility] Duplex Lower Ext Vein Scan, Bilat (07.19.23 @ 11:19) >  INTERPRETATION:  CLINICAL INFORMATION: Bedrest. Meningioma.    COMPARISON: None available.    TECHNIQUE: Duplex sonographyof the BILATERAL LOWER extremity veins with   color and spectral Doppler, with and without compression.    FINDINGS:    RIGHT:  Normal compressibility of the RIGHT common femoral, femoral and popliteal   veins.  Doppler examination shows normal spontaneous and phasic flow.  No RIGHT calf vein thrombosis is detected.    LEFT:  Normal compressibility of the LEFT common femoral, femoral and popliteal   veins.  Doppler examination shows normal spontaneous and phasic flow.  No LEFT calf vein thrombosis is detected.    IMPRESSION:  No evidence of deep venous thrombosis in either lower extremity.    < from: CT Head No Cont (07.19.23 @ 14:40) >  INTERPRETATION:  Noncontrast CT of the brain.    CLINICAL INDICATION:  Postoperative imaging    TECHNIQUE : Axial CT scanning of the brain wasobtained from the skull   base to the vertex without the administration of intravenous contrast.    COMPARISON: Brain MRI dated 7/10/2023    FINDINGS:  Right parietal craniotomy changes with resection of right   parietal mass subjacent right postsurgical cavity with a postprocedural   pneumocephaly. Trace right parietal subarachnoid hemorrhage.  Underlying   fluid measuring 1.3 cm  and trace hemorrhage,  No midline shift or hydrocephalus.    The orbits are not remarkable in appearance.    The visualized paranasal sinuses and tympanomastoid cavities are free of   acute disease.    IMPRESSION:    Status post resection of right parietal mass with anticipated   postsurgical changes.    < end of copied text >       Neurology - Consult Note    -  Spectra: [phone] ([facility]), [phone] ([facility])  -    HPI:   68 y/o F with PMHx significant for HTN, HLD, depression, pre-diabetes, fatty liver, morbid obesity s/p lap band 2013 who presented 7/17/2023 for headaches and giddiness at [employer]. On MRI, found to have progressively enlarging R parietal convexity meningioma, now s/p R craniotomy with resection with neurosurgery 7/18. Today is POD1 meningioma resection. Neurology consulted for intraop seizure s/p loading with keppra 1g. No further seizure activity.    Review of Systems:    CONSTITUTIONAL: No fevers or chills  EYES AND ENT: No visual changes or no throat pain   NECK: No pain or stiffness  RESPIRATORY: No hemoptysis or shortness of breath  CARDIOVASCULAR: No chest pain or palpitations  GASTROINTESTINAL: No melena or hematochezia  GENITOURINARY: No dysuria or hematuria  NEUROLOGICAL: +As stated in HPI above  SKIN: No itching, burning, rashes, or lesions   All other review of systems is negative unless indicated above.    Allergies:  No Known Allergies    PMHx/PSHx/Family Hx: As above, otherwise see below   Sleep apnea    S/P cholecystectomy    Uterine disorder    S/P knee surgery    S/P laminectomy    Diabetes mellitus    Hypertension    Depression    Tinea versicolor    Hemangioma    H/O colonoscopy    Pre-diabetes    Meningioma        Social Hx:  No current use of tobacco, alcohol, or illicit drugs    Medications:  MEDICATIONS  (STANDING):  atorvastatin 80 milliGRAM(s) Oral at bedtime  chlorhexidine 2% Cloths 1 Application(s) Topical once  dexAMETHasone  Injectable 4 milliGRAM(s) IV Push every 6 hours  enoxaparin Injectable 30 milliGRAM(s) SubCutaneous <User Schedule>  insulin lispro (ADMELOG) corrective regimen sliding scale   SubCutaneous every 6 hours  levETIRAcetam  IVPB 1000 milliGRAM(s) IV Intermittent every 12 hours  lisinopril 10 milliGRAM(s) Oral daily  pantoprazole  Injectable 40 milliGRAM(s) IV Push daily  senna 2 Tablet(s) Oral at bedtime  sodium chloride 0.9%. 1000 milliLiter(s) (75 mL/Hr) IV Continuous <Continuous>    MEDICATIONS  (PRN):  acetaminophen     Tablet .. 650 milliGRAM(s) Oral every 6 hours PRN Temp greater or equal to 38C (100.4F), Mild Pain (1 - 3)  ondansetron Injectable 4 milliGRAM(s) IV Push every 6 hours PRN Nausea and/or Vomiting  ondansetron Injectable 4 milliGRAM(s) IV Push once PRN Nausea and/or Vomiting  oxyCODONE    IR 5 milliGRAM(s) Oral every 4 hours PRN Moderate Pain (4 - 6)  oxyCODONE    IR 10 milliGRAM(s) Oral every 4 hours PRN Severe Pain (7 - 10)        Home Medications:  Altace 2.5 mg oral tablet: 1 tab(s) orally once a day (18 Jul 2023 07:28)  Crestor 20 mg oral tablet: 1 tab(s) orally once a day (18 Jul 2023 07:28)  venlafaxine 225 mg oral tablet, extended release: 1 tab(s) orally once a day (18 Jul 2023 07:28)      Vitals:  T(C): 36.4 (07-19-23 @ 12:00), Max: 36.8 (07-19-23 @ 08:00)  HR: 94 (07-19-23 @ 16:00) (92 - 126)  BP: 105/64 (07-19-23 @ 16:00) (105/64 - 125/74)  RR: 12 (07-19-23 @ 16:00) (12 - 18)  SpO2: 95% (07-19-23 @ 16:00) (91% - 100%)    Physical Examination:   General - NAD  Cardiovascular - Peripheral pulses palpable, no edema    Neurologic Exam:  Mental status - Awake, Alert, Oriented to person, place, and time. Speech fluent, repetition and naming intact. Follows simple and complex commands.     Cranial nerves - PERRL, VFF, EOMI, face sensation (V1-V3) intact LT, No facial asymmetry b/l, hearing grossly intact b/l, trapezius 5/5 strength b/l, tongue midline on protrusion     Motor - Normal bulk and tone throughout. No pronator drift.  Strength testing            Deltoid      Biceps      Triceps     Wrist Extension    Wrist Flexion       R            5                 5               5                     5                              5                        5  L             5                 5               5                     5                              5                       5              Hip Flexion    Hip Extension    Knee Flexion    Knee Extension    Dorsiflexion    Plantar Flexion  R              5                           5                       5                           5                            5                          5  L              5                           5                        5                           5                            5                          5    Sensation - Light touch/temperature intact throughout    DTR's -             Biceps      Triceps     Brachioradialis      Patellar    Ankle    Toes/plantar response  R             2+             2+                  2+                       2+            2+                 Down  L              2+             2+                 2+                        2+           2+                 Down    Coordination - Finger to Nose intact b/l. No tremors appreciated    Gait and station - Gait not assessed for patient safety.    Labs:                        10.7   9.27  )-----------( 152      ( 19 Jul 2023 05:49 )             32.3     07-19    141  |  108  |  12  ----------------------------<  169<H>  4.0   |  21<L>  |  0.53    Ca    8.1<L>      19 Jul 2023 05:49  Phos  4.4     07-19  Mg     2.1     07-19    TPro  6.1  /  Alb  3.8  /  TBili  0.5  /  DBili  x   /  AST  103<H>  /  ALT  79<H>  /  AlkPhos  50  07-18    CAPILLARY BLOOD GLUCOSE      POCT Blood Glucose.: 160 mg/dL (19 Jul 2023 16:20)    LIVER FUNCTIONS - ( 18 Jul 2023 15:07 )  Alb: 3.8 g/dL / Pro: 6.1 g/dL / ALK PHOS: 50 U/L / ALT: 79 U/L / AST: 103 U/L / GGT: x           Radiology:    < from: [facility] Duplex Lower Ext Vein Scan, Bilat (07.19.23 @ 11:19) >  INTERPRETATION:  CLINICAL INFORMATION: Bedrest. Meningioma.    COMPARISON: None available.    TECHNIQUE: Duplex sonographyof the BILATERAL LOWER extremity veins with   color and spectral Doppler, with and without compression.    FINDINGS:    RIGHT:  Normal compressibility of the RIGHT common femoral, femoral and popliteal   veins.  Doppler examination shows normal spontaneous and phasic flow.  No RIGHT calf vein thrombosis is detected.    LEFT:  Normal compressibility of the LEFT common femoral, femoral and popliteal   veins.  Doppler examination shows normal spontaneous and phasic flow.  No LEFT calf vein thrombosis is detected.    IMPRESSION:  No evidence of deep venous thrombosis in either lower extremity.    < from: CT Head No Cont (07.19.23 @ 14:40) >  INTERPRETATION:  Noncontrast CT of the brain.    CLINICAL INDICATION:  Postoperative imaging    TECHNIQUE : Axial CT scanning of the brain wasobtained from the skull   base to the vertex without the administration of intravenous contrast.    COMPARISON: Brain MRI dated 7/10/2023    FINDINGS:  Right parietal craniotomy changes with resection of right   parietal mass subjacent right postsurgical cavity with a postprocedural   pneumocephaly. Trace right parietal subarachnoid hemorrhage.  Underlying   fluid measuring 1.3 cm  and trace hemorrhage,  No midline shift or hydrocephalus.    The orbits are not remarkable in appearance.    The visualized paranasal sinuses and tympanomastoid cavities are free of   acute disease.    IMPRESSION:    Status post resection of right parietal mass with anticipated   postsurgical changes.    < end of copied text >       Neurology - Consult Note    -  Spectra: [phone] ([facility]), [phone] ([facility])  -    HPI:   68 y/o F with PMHx significant for HTN, HLD, depression, pre-diabetes, fatty liver, morbid obesity s/p lap band 2013 who presented 7/17/2023 for headaches and giddiness at [employer]. On MRI, found to have progressively enlarging R parietal convexity meningioma, now s/p R craniotomy with resection with neurosurgery 7/18. Today is POD1 meningioma resection. Neurology consulted for intraop seizure s/p loading with keppra 1g. No further seizure activity.    Review of Systems:    CONSTITUTIONAL: No fevers or chills  EYES AND ENT: No visual changes or no throat pain   NECK: No pain or stiffness  RESPIRATORY: No hemoptysis or shortness of breath  CARDIOVASCULAR: No chest pain or palpitations  GASTROINTESTINAL: No melena or hematochezia  GENITOURINARY: No dysuria or hematuria  NEUROLOGICAL: +As stated in HPI above  SKIN: No itching, burning, rashes, or lesions   All other review of systems is negative unless indicated above.    Allergies:  No Known Allergies    PMHx/PSHx/Family Hx: As above, otherwise see below   Sleep apnea    S/P cholecystectomy    Uterine disorder    S/P knee surgery    S/P laminectomy    Diabetes mellitus    Hypertension    Depression    Tinea versicolor    Hemangioma    H/O colonoscopy    Pre-diabetes    Meningioma        Social Hx:  No current use of tobacco, alcohol, or illicit drugs    Medications:  MEDICATIONS  (STANDING):  atorvastatin 80 milliGRAM(s) Oral at bedtime  chlorhexidine 2% Cloths 1 Application(s) Topical once  dexAMETHasone  Injectable 4 milliGRAM(s) IV Push every 6 hours  enoxaparin Injectable 30 milliGRAM(s) SubCutaneous <User Schedule>  insulin lispro (ADMELOG) corrective regimen sliding scale   SubCutaneous every 6 hours  levETIRAcetam  IVPB 1000 milliGRAM(s) IV Intermittent every 12 hours  lisinopril 10 milliGRAM(s) Oral daily  pantoprazole  Injectable 40 milliGRAM(s) IV Push daily  senna 2 Tablet(s) Oral at bedtime  sodium chloride 0.9%. 1000 milliLiter(s) (75 mL/Hr) IV Continuous <Continuous>    MEDICATIONS  (PRN):  acetaminophen     Tablet .. 650 milliGRAM(s) Oral every 6 hours PRN Temp greater or equal to 38C (100.4F), Mild Pain (1 - 3)  ondansetron Injectable 4 milliGRAM(s) IV Push every 6 hours PRN Nausea and/or Vomiting  ondansetron Injectable 4 milliGRAM(s) IV Push once PRN Nausea and/or Vomiting  oxyCODONE    IR 5 milliGRAM(s) Oral every 4 hours PRN Moderate Pain (4 - 6)  oxyCODONE    IR 10 milliGRAM(s) Oral every 4 hours PRN Severe Pain (7 - 10)        Home Medications:  Altace 2.5 mg oral tablet: 1 tab(s) orally once a day (18 Jul 2023 07:28)  Crestor 20 mg oral tablet: 1 tab(s) orally once a day (18 Jul 2023 07:28)  venlafaxine 225 mg oral tablet, extended release: 1 tab(s) orally once a day (18 Jul 2023 07:28)      Vitals:  T(C): 36.4 (07-19-23 @ 12:00), Max: 36.8 (07-19-23 @ 08:00)  HR: 94 (07-19-23 @ 16:00) (92 - 126)  BP: 105/64 (07-19-23 @ 16:00) (105/64 - 125/74)  RR: 12 (07-19-23 @ 16:00) (12 - 18)  SpO2: 95% (07-19-23 @ 16:00) (91% - 100%)    Physical Examination:   General - NAD  Cardiovascular - Peripheral pulses palpable, no edema  Eyes - Fundoscopy not well visualized    Neurologic Exam:  Mental status - Awake, Alert, Oriented to person, place, and time. Speech fluent, repetition and naming intact. Follows simple and complex commands. Attention/concentration, recent and remote memory, and fund of knowledge intact    Cranial nerves - PERRL, VFF, EOMI, face sensation (V1-V3) intact LT, No facial asymmetry b/l, hearing grossly intact b/l, trapezius 5/5 strength b/l, tongue midline on protrusion     Motor - Normal bulk and tone throughout. No pronator drift.  Strength testing            Deltoid      Biceps      Triceps     Wrist Extension    Wrist Flexion       R            5                 5               5                     5                              5                        5  L             5                 5               5                     5                              5                       5              Hip Flexion    Hip Extension    Knee Flexion    Knee Extension    Dorsiflexion    Plantar Flexion  R              5                           5                       5                           5                            5                          5  L              5                           5                        5                           5                            5                          5    Sensation - Light touch/temperature intact throughout    DTR's -             Biceps      Triceps     Brachioradialis      Patellar    Ankle    Toes/plantar response  R             2+             2+                  2+                       2+            2+                 Down  L              2+             2+                 2+                        2+           2+                 Down    Coordination - Finger to Nose intact b/l. No tremors appreciated    Gait and station - Not assessed for patient safety.    Labs:                        10.7   9.27  )-----------( 152      ( 19 Jul 2023 05:49 )             32.3     07-19    141  |  108  |  12  ----------------------------<  169<H>  4.0   |  21<L>  |  0.53    Ca    8.1<L>      19 Jul 2023 05:49  Phos  4.4     07-19  Mg     2.1     07-19    TPro  6.1  /  Alb  3.8  /  TBili  0.5  /  DBili  x   /  AST  103<H>  /  ALT  79<H>  /  AlkPhos  50  07-18    CAPILLARY BLOOD GLUCOSE      POCT Blood Glucose.: 160 mg/dL (19 Jul 2023 16:20)    LIVER FUNCTIONS - ( 18 Jul 2023 15:07 )  Alb: 3.8 g/dL / Pro: 6.1 g/dL / ALK PHOS: 50 U/L / ALT: 79 U/L / AST: 103 U/L / GGT: x           Radiology:    < from: [facility] Duplex Lower Ext Vein Scan, Bilat (07.19.23 @ 11:19) >  INTERPRETATION:  CLINICAL INFORMATION: Bedrest. Meningioma.    COMPARISON: None available.    TECHNIQUE: Duplex sonographyof the BILATERAL LOWER extremity veins with   color and spectral Doppler, with and without compression.    FINDINGS:    RIGHT:  Normal compressibility of the RIGHT common femoral, femoral and popliteal   veins.  Doppler examination shows normal spontaneous and phasic flow.  No RIGHT calf vein thrombosis is detected.    LEFT:  Normal compressibility of the LEFT common femoral, femoral and popliteal   veins.  Doppler examination shows normal spontaneous and phasic flow.  No LEFT calf vein thrombosis is detected.    IMPRESSION:  No evidence of deep venous thrombosis in either lower extremity.    < from: CT Head No Cont (07.19.23 @ 14:40) >  INTERPRETATION:  Noncontrast CT of the brain.    CLINICAL INDICATION:  Postoperative imaging    TECHNIQUE : Axial CT scanning of the brain wasobtained from the skull   base to the vertex without the administration of intravenous contrast.    COMPARISON: Brain MRI dated 7/10/2023    FINDINGS:  Right parietal craniotomy changes with resection of right   parietal mass subjacent right postsurgical cavity with a postprocedural   pneumocephaly. Trace right parietal subarachnoid hemorrhage.  Underlying   fluid measuring 1.3 cm  and trace hemorrhage,  No midline shift or hydrocephalus.    The orbits are not remarkable in appearance.    The visualized paranasal sinuses and tympanomastoid cavities are free of   acute disease.    IMPRESSION:    Status post resection of right parietal mass with anticipated   postsurgical changes.    < end of copied text >

## 2023-07-19 NOTE — PHYSICAL THERAPY INITIAL EVALUATION ADULT - PERTINENT HX OF CURRENT PROBLEM, REHAB EVAL
68 yo F presents with brain tumor s/p R craniotomy for meningioma resection w/ intraoperative seizure 7/18. Loaded with keppra. No postoperative clinical seizures or concerns for subclinical seizures.

## 2023-07-20 LAB
ANION GAP SERPL CALC-SCNC: 11 MMOL/L — SIGNIFICANT CHANGE UP (ref 5–17)
BUN SERPL-MCNC: 16 MG/DL — SIGNIFICANT CHANGE UP (ref 7–23)
CALCIUM SERPL-MCNC: 8.4 MG/DL — SIGNIFICANT CHANGE UP (ref 8.4–10.5)
CHLORIDE SERPL-SCNC: 106 MMOL/L — SIGNIFICANT CHANGE UP (ref 96–108)
CO2 SERPL-SCNC: 25 MMOL/L — SIGNIFICANT CHANGE UP (ref 22–31)
CREAT SERPL-MCNC: 0.65 MG/DL — SIGNIFICANT CHANGE UP (ref 0.5–1.3)
EGFR: 98 ML/MIN/1.73M2 — SIGNIFICANT CHANGE UP
GLUCOSE BLDC GLUCOMTR-MCNC: 134 MG/DL — HIGH (ref 70–99)
GLUCOSE BLDC GLUCOMTR-MCNC: 135 MG/DL — HIGH (ref 70–99)
GLUCOSE BLDC GLUCOMTR-MCNC: 142 MG/DL — HIGH (ref 70–99)
GLUCOSE BLDC GLUCOMTR-MCNC: 147 MG/DL — HIGH (ref 70–99)
GLUCOSE BLDC GLUCOMTR-MCNC: 147 MG/DL — HIGH (ref 70–99)
GLUCOSE BLDC GLUCOMTR-MCNC: 153 MG/DL — HIGH (ref 70–99)
GLUCOSE SERPL-MCNC: 144 MG/DL — HIGH (ref 70–99)
HCT VFR BLD CALC: 31.4 % — LOW (ref 34.5–45)
HGB BLD-MCNC: 10.3 G/DL — LOW (ref 11.5–15.5)
MCHC RBC-ENTMCNC: 29.8 PG — SIGNIFICANT CHANGE UP (ref 27–34)
MCHC RBC-ENTMCNC: 32.8 GM/DL — SIGNIFICANT CHANGE UP (ref 32–36)
MCV RBC AUTO: 90.8 FL — SIGNIFICANT CHANGE UP (ref 80–100)
NRBC # BLD: 0 /100 WBCS — SIGNIFICANT CHANGE UP (ref 0–0)
PLATELET # BLD AUTO: 126 K/UL — LOW (ref 150–400)
POTASSIUM SERPL-MCNC: 4.1 MMOL/L — SIGNIFICANT CHANGE UP (ref 3.5–5.3)
POTASSIUM SERPL-SCNC: 4.1 MMOL/L — SIGNIFICANT CHANGE UP (ref 3.5–5.3)
RBC # BLD: 3.46 M/UL — LOW (ref 3.8–5.2)
RBC # FLD: 13.4 % — SIGNIFICANT CHANGE UP (ref 10.3–14.5)
SODIUM SERPL-SCNC: 142 MMOL/L — SIGNIFICANT CHANGE UP (ref 135–145)
WBC # BLD: 6.13 K/UL — SIGNIFICANT CHANGE UP (ref 3.8–10.5)
WBC # FLD AUTO: 6.13 K/UL — SIGNIFICANT CHANGE UP (ref 3.8–10.5)

## 2023-07-20 PROCEDURE — 95720 EEG PHY/QHP EA INCR W/VEEG: CPT

## 2023-07-20 PROCEDURE — 99222 1ST HOSP IP/OBS MODERATE 55: CPT

## 2023-07-20 PROCEDURE — 99223 1ST HOSP IP/OBS HIGH 75: CPT | Mod: GC

## 2023-07-20 RX ORDER — INSULIN LISPRO 100/ML
VIAL (ML) SUBCUTANEOUS AT BEDTIME
Refills: 0 | Status: DISCONTINUED | OUTPATIENT
Start: 2023-07-20 | End: 2023-07-22

## 2023-07-20 RX ORDER — POLYETHYLENE GLYCOL 3350 17 G/17G
17 POWDER, FOR SOLUTION ORAL
Refills: 0 | Status: DISCONTINUED | OUTPATIENT
Start: 2023-07-20 | End: 2023-07-22

## 2023-07-20 RX ORDER — SODIUM CHLORIDE 9 MG/ML
1000 INJECTION, SOLUTION INTRAVENOUS
Refills: 0 | Status: DISCONTINUED | OUTPATIENT
Start: 2023-07-20 | End: 2023-07-22

## 2023-07-20 RX ORDER — DEXTROSE 50 % IN WATER 50 %
25 SYRINGE (ML) INTRAVENOUS ONCE
Refills: 0 | Status: DISCONTINUED | OUTPATIENT
Start: 2023-07-20 | End: 2023-07-22

## 2023-07-20 RX ORDER — DEXAMETHASONE 0.5 MG/5ML
3 ELIXIR ORAL EVERY 6 HOURS
Refills: 0 | Status: DISCONTINUED | OUTPATIENT
Start: 2023-07-20 | End: 2023-07-22

## 2023-07-20 RX ORDER — GLUCAGON INJECTION, SOLUTION 0.5 MG/.1ML
1 INJECTION, SOLUTION SUBCUTANEOUS ONCE
Refills: 0 | Status: DISCONTINUED | OUTPATIENT
Start: 2023-07-20 | End: 2023-07-22

## 2023-07-20 RX ORDER — DEXTROSE 50 % IN WATER 50 %
12.5 SYRINGE (ML) INTRAVENOUS ONCE
Refills: 0 | Status: DISCONTINUED | OUTPATIENT
Start: 2023-07-20 | End: 2023-07-22

## 2023-07-20 RX ORDER — VENLAFAXINE HCL 75 MG
25 CAPSULE, EXT RELEASE 24 HR ORAL DAILY
Refills: 0 | Status: DISCONTINUED | OUTPATIENT
Start: 2023-07-20 | End: 2023-07-22

## 2023-07-20 RX ORDER — INSULIN LISPRO 100/ML
VIAL (ML) SUBCUTANEOUS
Refills: 0 | Status: DISCONTINUED | OUTPATIENT
Start: 2023-07-20 | End: 2023-07-22

## 2023-07-20 RX ORDER — DEXTROSE 50 % IN WATER 50 %
15 SYRINGE (ML) INTRAVENOUS ONCE
Refills: 0 | Status: DISCONTINUED | OUTPATIENT
Start: 2023-07-20 | End: 2023-07-22

## 2023-07-20 RX ADMIN — OXYCODONE HYDROCHLORIDE 10 MILLIGRAM(S): 5 TABLET ORAL at 00:49

## 2023-07-20 RX ADMIN — Medication 650 MILLIGRAM(S): at 06:33

## 2023-07-20 RX ADMIN — PANTOPRAZOLE SODIUM 40 MILLIGRAM(S): 20 TABLET, DELAYED RELEASE ORAL at 12:55

## 2023-07-20 RX ADMIN — Medication 5 MILLIGRAM(S): at 21:52

## 2023-07-20 RX ADMIN — POLYETHYLENE GLYCOL 3350 17 GRAM(S): 17 POWDER, FOR SOLUTION ORAL at 09:16

## 2023-07-20 RX ADMIN — LISINOPRIL 10 MILLIGRAM(S): 2.5 TABLET ORAL at 05:23

## 2023-07-20 RX ADMIN — ATORVASTATIN CALCIUM 80 MILLIGRAM(S): 80 TABLET, FILM COATED ORAL at 21:52

## 2023-07-20 RX ADMIN — ENOXAPARIN SODIUM 30 MILLIGRAM(S): 100 INJECTION SUBCUTANEOUS at 09:16

## 2023-07-20 RX ADMIN — POLYETHYLENE GLYCOL 3350 17 GRAM(S): 17 POWDER, FOR SOLUTION ORAL at 18:14

## 2023-07-20 RX ADMIN — OXYCODONE HYDROCHLORIDE 5 MILLIGRAM(S): 5 TABLET ORAL at 14:08

## 2023-07-20 RX ADMIN — SENNA PLUS 2 TABLET(S): 8.6 TABLET ORAL at 21:52

## 2023-07-20 RX ADMIN — Medication 650 MILLIGRAM(S): at 19:23

## 2023-07-20 RX ADMIN — Medication 4 MILLIGRAM(S): at 05:25

## 2023-07-20 RX ADMIN — OXYCODONE HYDROCHLORIDE 10 MILLIGRAM(S): 5 TABLET ORAL at 00:19

## 2023-07-20 RX ADMIN — LEVETIRACETAM 400 MILLIGRAM(S): 250 TABLET, FILM COATED ORAL at 21:52

## 2023-07-20 RX ADMIN — Medication 4 MILLIGRAM(S): at 00:19

## 2023-07-20 RX ADMIN — Medication 650 MILLIGRAM(S): at 19:53

## 2023-07-20 RX ADMIN — Medication 3 MILLIGRAM(S): at 12:55

## 2023-07-20 RX ADMIN — Medication 3 MILLIGRAM(S): at 18:12

## 2023-07-20 RX ADMIN — LEVETIRACETAM 400 MILLIGRAM(S): 250 TABLET, FILM COATED ORAL at 09:16

## 2023-07-20 RX ADMIN — ENOXAPARIN SODIUM 30 MILLIGRAM(S): 100 INJECTION SUBCUTANEOUS at 21:58

## 2023-07-20 RX ADMIN — Medication 650 MILLIGRAM(S): at 07:03

## 2023-07-20 RX ADMIN — Medication 25 MILLIGRAM(S): at 12:54

## 2023-07-20 RX ADMIN — OXYCODONE HYDROCHLORIDE 5 MILLIGRAM(S): 5 TABLET ORAL at 13:38

## 2023-07-20 NOTE — PROGRESS NOTE ADULT - ASSESSMENT
68 yo female, PMH HTN, HLD, depression, pre-diabetic, fatty liver, morbid obesity s/p lap band 2013, lost weight and gained it back - recent visit to bariatric GI - unable to deflate due to resistance on accessing port (note in chart) Pt. reports that in 2011 she fainted while in [employer], taken to [facility] and CT scan revealed a cerebral tumor, which was later diagnosed as a meningioma - she has been under observation and it has grown in size. Pt. presents to PST for scheduled Right Craniotomy for Tumor on 7/18/23. Pt. reports SCOTT which is unchanged, does not have an active lifestyle Denies dizziness, vision changes, headaches, recent fever, chills, chest pain, SOB, changes in bowel/urinary habits or recent exposure to COVID-19 infection. Pt. denies clotting or bleeding disorders.    (27 Jun 2023 14:55)    PROCEDURE: Adm 7/18 S/P Right craniotomy for Meningioma Rsxn w/intraop Seizure ([facility])     POD#2    PLAN:  Ne[facility] Cont Keppra 1gm BID-s/p Sz in OR, No EEG unless further seizures. On Dex [id number]-[id number] to 3Q6h now and ?taper. Thrombocytopenia FU Plts AM. FU Anti Xa in AM. Acute anemia from surgical blood loss.  IVL now. Inc activity/OOB. Recomm for A.Rehab, PMR eval-P, but most likely DC Home 7/21 per pt desire.    Neuro saw on 7/19-FU Final Recomm-P.    Respiratory: Patient instructed to use incentive spirometer [ X] YES [ ] NO              DVT ppx: [X ] SQL [ ] SQH and Venodynes [ ] Left [ ] Right [ X] Bilateral    Discharge Planning:  The patient was evaluated by PT/OTand recommended Acute Rehab.  PMR Eval-P, but pt wants to go home and will most likley be DC 7/21 once cleared by PT.  She was subsequently DC on >>> in stable condition.    More than 30 minutes spent on total encounter: more than 50% of the visit was spent on educating the patient and family regarding condition, medications, follow up plans, signs and symptoms to be concerned with, preparing paperwork, and questions answered regarding discharge.

## 2023-07-20 NOTE — CHART NOTE - NSCHARTNOTEFT_GEN_A_CORE
CAPRINI SCORE [CLOT] Score on Admission for     AGE RELATED RISK FACTORS                                                       MOBILITY RELATED FACTORS  [ ] Age 41-60 years                                            (1 Point)                  [ ] Bed rest                                                        (1 Point)  [x] Age: 61-74 years                                           (2 Points)                 [ ] Plaster cast                                                   (2 Points)  [ ] Age= 75 years                                              (3 Points)                 [ ] Bed bound for more than 72 hours                 (2 Points)    DISEASE RELATED RISK FACTORS                                               GENDER SPECIFIC FACTORS  [ ] Edema in the lower extremities                       (1 Point)                  [ ] Pregnancy                                                     (1 Point)  [ ] Varicose veins                                               (1 Point)                  [ ] Post-partum < 6 weeks                                   (1 Point)             [x ] BMI > 25 Kg/m2                                            (1 Point)                  [ ] Hormonal therapy  or oral contraception          (1 Point)                 [ ] Sepsis (in the previous month)                        (1 Point)                  [ ] History of pregnancy complications                 (1 point)  [ ] Pneumonia or serious lung disease                                               [ ] Unexplained or recurrent                     (1 Point)           (in the previous month)                               (1 Point)  [ ] Abnormal pulmonary function test                     (1 Point)                 SURGERY RELATED RISK FACTORS (include planned surgeries)  [ ] Acute myocardial infarction                              (1 Point)                 [ ]  Section                                             (1 Point)  [ ] Congestive heart failure (in the previous month)  (1 Point)         [ ] Minor surgery                                                  (1 Point)   [ ] Inflammatory bowel disease                             (1 Point)                 [ ] Arthroscopic surgery                                        (2 Points)  [ ] Central venous access                                      (2 Points)                [x ] General surgery lasting more than 45 minutes   (2 Points)       [ ] Stroke (in the previous month)                          (5 Points)               [ ] Elective arthroplasty                                         (5 Points)            [ ] current or past malignancy                              (2 Points)                                                                                                       HEMATOLOGY RELATED FACTORS                                                 TRAUMA RELATED RISK FACTORS  [ ] Prior episodes of VTE                                     (3 Points)                [ ] Fracture of the hip, pelvis, or leg                       (5 Points)  [ ] Positive family history for VTE                         (3 Points)                 [ ] Acute spinal cord injury (in the previous month)  (5 Points)  [ ] Prothrombin 46768 A                                     (3 Points)                 [ ] Paralysis  (less than 1 month)                             (5 Points)  [ ] Factor V Leiden                                             (3 Points)                  [ ] Multiple Trauma within 1 month                        (5 Points)  [ ] Lupus anticoagulants                                     (3 Points)                                                           [ ] Anticardiolipin antibodies                               (3 Points)                                                       [ ] High homocysteine in the blood                      (3 Points)                                             [ ] Other congenital or acquired thrombophilia      (3 Points)                                                [ ] Heparin induced thrombocytopenia                  (3 Points)                                          Total Score [     5     ]    Risk:  Very low 0   Low 1 to 2   Moderate 3 to 4   High =5       VTE Prophylasix Recommednations:  [x ] mechanical pneumatic compression devices                                      [ ] contraindicated: _____________________  [ ] chemo prophylasix                                                                                   [x ] contraindicated _____POD 0 Bleeding Risk________________    **** HIGH LIKELIHOOD DVT PRESENT ON ADMISSION  [x] (please order LE dopplers within 24 hours of admission)
Preliminary EEG report (not final)  Approximately 1.5 hrs reviewed    No epileptiform abnormalities.    Final report will be entered after completion of study in the morning.    Clarice Bey MD  Attending Physician, [facility] [place]

## 2023-07-20 NOTE — PROGRESS NOTE ADULT - SUBJECTIVE AND OBJECTIVE BOX
SUBJECTIVE: This is my initial visit with this pt who [facility]. Sl HA-stable since postop. No other complaints    OVERNIGHT EVENTS: None    Vital Signs Last 24 Hrs  T(C): 36.7 (20 Jul 2023 04:59), Max: 37 (20 Jul 2023 00:16)  T(F): 98.1 (20 Jul 2023 04:59), Max: 98.6 (20 Jul 2023 00:16)  HR: 91 (20 Jul 2023 04:59) (90 - 126)  BP: 121/82 (20 Jul 2023 04:59) (103/57 - 131/74)  BP(mean): 89 (19 Jul 2023 22:00) (74 - 89)  RR: 18 (20 Jul 2023 04:59) (12 - 18)  SpO2: 96% (20 Jul 2023 04:59) (93% - 98%)    Parameters below as of 20 Jul 2023 04:59  Patient On (Oxygen Delivery Method): room air    IVF: [ ] IVL [ X] NS 75/H   DIET: [ ] Regular [ X] CCD [ ] Renal [ ] Puree [ ] Dysphagia [ ] Tube Feeds:   PCA: [ ] YES [X ] NO   WAGNER: [ ] YES [X ] NO [ X] VOID   BM: [ ] YES [X ] NO     DRAINS: None    PHYSICAL EXAM:    General: No Acute Distress     Neurological: Awake, alert oriented to person, place and time, Following Commands, PERRL, EOMI, VFF. Face Symmetrical, Speech Fluent, Moving all extremities, Muscle Strength normal in all four extremities, No Drift, Sensation to Light Touch Intact    Pulmonary: Clear to Auscultation, No Rales, No Rhonchi, No Wheezes     Cardiovascular: S1, S2, Regular Rate and Rhythm     Gastrointestinal: Soft, Nontender, Nondistended     Incision: +staples-CDI/Flat    LABS:                        10.3   6.13  )-----------( 126      ( 20 Jul 2023 07:07 )             31.4    07-20    142  |  106  |  16  ----------------------------<  144<H>  4.1   |  25  |  0.65    Ca    8.4      20 Jul 2023 07:10  Phos  4.4     07-19  Mg     2.1     07-19    TPro  6.1  /  Alb  3.8  /  TBili  0.5  /  DBili  x   /  AST  103<H>  /  ALT  79<H>  /  AlkPhos  50  07-18 07-19 @ 07:01  -  07-20 @ 07:00  --------------------------------------------------------  IN: 1392.5 mL / OUT: 750 mL / NET: 642.5 mL      IMAGING:   < from: CT Head No Cont (07.19.23 @ 14:40) >  Status post resection of right parietal mass with anticipated   postsurgical changes.    < end of copied text >  < from: [facility] Duplex Lower Ext Vein Scan, Bilat (07.19.23 @ 11:19) >  No evidence of deep venous thrombosis in either lower extremity.    < end of copied text >    MEDICATIONS  (STANDING):  atorvastatin 80 milliGRAM(s) Oral at bedtime  chlorhexidine 2% Cloths 1 Application(s) Topical once  dexAMETHasone  Injectable 4 milliGRAM(s) IV Push every 6 hours  enoxaparin Injectable 30 milliGRAM(s) SubCutaneous <User Schedule>  insulin lispro (ADMELOG) corrective regimen sliding scale   SubCutaneous every 6 hours  levETIRAcetam  IVPB 1000 milliGRAM(s) IV Intermittent every 12 hours  lisinopril 10 milliGRAM(s) Oral daily  pantoprazole  Injectable 40 milliGRAM(s) IV Push daily  senna 2 Tablet(s) Oral at bedtime  sodium chloride 0.9%. 1000 milliLiter(s) (75 mL/Hr) IV Continuous <Continuous>    MEDICATIONS  (PRN):  acetaminophen     Tablet .. 650 milliGRAM(s) Oral every 6 hours PRN Temp greater or equal to 38C (100.4F), Mild Pain (1 - 3)  ondansetron Injectable 4 milliGRAM(s) IV Push every 6 hours PRN Nausea and/or Vomiting  oxyCODONE    IR 10 milliGRAM(s) Oral every 4 hours PRN Severe Pain (7 - 10)  oxyCODONE    IR 5 milliGRAM(s) Oral every 4 hours PRN Moderate Pain (4 - 6)

## 2023-07-20 NOTE — CONSULT NOTE ADULT - SUBJECTIVE AND OBJECTIVE BOX
Patient is a 67y old  Female who presents with a chief complaint of s/p craniotomy (18 Jul 2023 18:00)    Admission HPI:  66 yo female, PMH HTN, HLD, depression, pre-diabetic, fatty liver, morbid obesity s/p lap band 2013, lost weight and gained it back - recent visit to bariatric GI - unable to deflate due to resistance on accessing port (note in chart) Pt. reports that in 2011 she fainted while [place] [employer], taken to [facility] and CT scan revealed a cerebral tumor, which was later diagnosed as a meningioma - she has been under observation and it has grown in size. Pt. presents to PST for scheduled Right Craniotomy for Tumor on 7/18/23. Pt. reports SCOTT which is unchanged, does not have an active lifestyle Denies dizziness, vision changes, headaches, recent fever, chills, chest pain, SOB, changes in bowel/urinary habits or recent exposure to COVID-19 infection. Pt. denies clotting or bleeding disorders.    (27 Jun 2023 14:55)    Interval History:  Patient went to OR on 7/18 for R craniotomy and resection of meningioma.  Post-op with some functional deficits.    REVIEW OF SYSTEMS: + HA, + poor balance, No chest pain, shortness of breath, nausea, vomiting or diarhea; other ROS neg     PAST MEDICAL & SURGICAL HISTORY  Sleep apnea    S/P cholecystectomy    Uterine disorder    S/P knee surgery    S/P laminectomy    Diabetes mellitus    Hypertension    Depression    Tinea versicolor    Hemangioma    H/O colonoscopy    Pre-diabetes    Meningioma    Hypertension    Diabetes mellitus    Uterine disorder    S/P cholecystectomy    S/P knee surgery    S/P laminectomy    H/O laparoscopic adjustable gastric banding    FUNCTIONAL HISTORY:   Lives w - home w 3 NEISHA  PTA Independent    CURRENT FUNCTIONAL STATUS:  CG transfers and gait    FAMILY HISTORY   N/C    MEDICATIONS   acetaminophen     Tablet .. 650 milliGRAM(s) Oral every 6 hours PRN  atorvastatin 80 milliGRAM(s) Oral at bedtime  bisacodyl 5 milliGRAM(s) Oral at bedtime  chlorhexidine 2% Cloths 1 Application(s) Topical once  dexAMETHasone     Tablet 3 milliGRAM(s) Oral every 6 hours  enoxaparin Injectable 30 milliGRAM(s) SubCutaneous <User Schedule>  insulin lispro (ADMELOG) corrective regimen sliding scale   SubCutaneous every 6 hours  levETIRAcetam  IVPB 1000 milliGRAM(s) IV Intermittent every 12 hours  lisinopril 10 milliGRAM(s) Oral daily  ondansetron Injectable 4 milliGRAM(s) IV Push every 6 hours PRN  oxyCODONE    IR 10 milliGRAM(s) Oral every 4 hours PRN  oxyCODONE    IR 5 milliGRAM(s) Oral every 4 hours PRN  pantoprazole  Injectable 40 milliGRAM(s) IV Push daily  polyethylene glycol 3350 17 Gram(s) Oral two times a day  senna 2 Tablet(s) Oral at bedtime  venlafaxine 25 milliGRAM(s) Oral daily      ALLERGIES  No Known Allergies      VITALS  T(C): 36.7 (07-20-23 @ 09:06), Max: 37 (07-20-23 @ 00:16)  HR: 76 (07-20-23 @ 11:17) (76 - 100)  BP: 113/69 (07-20-23 @ 11:17) (103/57 - 131/74)  RR: 18 (07-20-23 @ 09:06) (12 - 18)  SpO2: 99% (07-20-23 @ 11:17) (93% - 99%)  Wt(kg): --    PHYSICAL EXAM  Constitutional - NAD, Comfortable  HEENT - Staples intact  Neck - Supple, No limited ROM  Chest - CTA bilaterally, No wheeze, No rhonchi, No crackles  Cardiovascular - RRR, S1S2, No murmurs  Abdomen - BS+, Soft, NTND  Extremities - No C/C/E, No calf tenderness   Neurologic Exam -                 AAO x 3  Motor non-focal   No clonus     Psychiatric - Mood stable, Affect WNL    RECENT LABS/IMAGING  CBC Full  -  ( 20 Jul 2023 07:07 )  WBC Count : 6.13 K/uL  RBC Count : 3.46 M/uL  Hemoglobin : 10.3 g/dL  Hematocrit : 31.4 %  Platelet Count - Automated : 126 K/uL  Mean Cell Volume : 90.8 fl  Mean Cell Hemoglobin : 29.8 pg  Mean Cell Hemoglobin Concentration : 32.8 gm/dL  Auto Neutrophil # : x  Auto Lymphocyte # : x  Auto Monocyte # : x  Auto Eosinophil # : x  Auto Basophil # : x  Auto Neutrophil % : x  Auto Lymphocyte % : x  Auto Monocyte % : x  Auto Eosinophil % : x  Auto Basophil % : x    07-20    142  |  106  |  16  ----------------------------<  144<H>  4.1   |  25  |  0.65    Ca    8.4      20 Jul 2023 07:10  Phos  4.4     07-19  Mg     2.1     07-19    TPro  6.1  /  Alb  3.8  /  TBili  0.5  /  DBili  x   /  AST  103<H>  /  ALT  79<H>  /  AlkPhos  50  07-18    Urinalysis Basic - ( 20 Jul 2023 07:10 )    Color: x / Appearance: x / SG: x / pH: x  Gluc: 144 mg/dL / Ketone: x  / Bili: x / Urobili: x   Blood: x / Protein: x / Nitrite: x   Leuk Esterase: x / RBC: x / WBC x   Sq Epi: x / Non Sq Epi: x / Bacteria: x    Impression:  66 yo with functional deficits secondary to diagnosis of meningioma    Plan:  PT- ROM, Bed Mob, Transfers, Amb w AD and bracing as needed  OT- ADLs, bracing  SLP- Dysphagia eval and treat  Prec- Falls, Cardiac  DVT Prophylaxis- Lovenox  Skin- Turn q2 h  Dispo- Acute Rehab- can tolerate 3h/d of therapies and requires daily physician visits  Patient prefers to go home- explained benefit of inpatient rehab- if she chooses to go home will initially need 24h supervision which was discussed w patient and her dtr

## 2023-07-21 DIAGNOSIS — I48.0 PAROXYSMAL ATRIAL FIBRILLATION: ICD-10-CM

## 2023-07-21 DIAGNOSIS — R06.00 DYSPNEA, UNSPECIFIED: ICD-10-CM

## 2023-07-21 LAB
ALBUMIN SERPL ELPH-MCNC: 3.8 G/DL — SIGNIFICANT CHANGE UP (ref 3.3–5)
ALP SERPL-CCNC: 52 U/L — SIGNIFICANT CHANGE UP (ref 40–120)
ALT FLD-CCNC: 99 U/L — HIGH (ref 10–45)
ANION GAP SERPL CALC-SCNC: 10 MMOL/L — SIGNIFICANT CHANGE UP (ref 5–17)
AST SERPL-CCNC: 53 U/L — HIGH (ref 10–40)
BILIRUB SERPL-MCNC: 0.3 MG/DL — SIGNIFICANT CHANGE UP (ref 0.2–1.2)
BUN SERPL-MCNC: 20 MG/DL — SIGNIFICANT CHANGE UP (ref 7–23)
CALCIUM SERPL-MCNC: 8.5 MG/DL — SIGNIFICANT CHANGE UP (ref 8.4–10.5)
CHLORIDE SERPL-SCNC: 105 MMOL/L — SIGNIFICANT CHANGE UP (ref 96–108)
CO2 SERPL-SCNC: 26 MMOL/L — SIGNIFICANT CHANGE UP (ref 22–31)
CREAT SERPL-MCNC: 0.79 MG/DL — SIGNIFICANT CHANGE UP (ref 0.5–1.3)
EGFR: 83 ML/MIN/1.73M2 — SIGNIFICANT CHANGE UP
GLUCOSE BLDC GLUCOMTR-MCNC: 117 MG/DL — HIGH (ref 70–99)
GLUCOSE BLDC GLUCOMTR-MCNC: 126 MG/DL — HIGH (ref 70–99)
GLUCOSE BLDC GLUCOMTR-MCNC: 174 MG/DL — HIGH (ref 70–99)
GLUCOSE BLDC GLUCOMTR-MCNC: 176 MG/DL — HIGH (ref 70–99)
GLUCOSE SERPL-MCNC: 129 MG/DL — HIGH (ref 70–99)
HCT VFR BLD CALC: 31.9 % — LOW (ref 34.5–45)
HGB BLD-MCNC: 10.2 G/DL — LOW (ref 11.5–15.5)
LMWH PPP CHRO-ACNC: 0.31 IU/ML — LOW (ref 0.5–1.1)
MAGNESIUM SERPL-MCNC: 2.2 MG/DL — SIGNIFICANT CHANGE UP (ref 1.6–2.6)
MCHC RBC-ENTMCNC: 29.1 PG — SIGNIFICANT CHANGE UP (ref 27–34)
MCHC RBC-ENTMCNC: 32 GM/DL — SIGNIFICANT CHANGE UP (ref 32–36)
MCV RBC AUTO: 90.9 FL — SIGNIFICANT CHANGE UP (ref 80–100)
NRBC # BLD: 0 /100 WBCS — SIGNIFICANT CHANGE UP (ref 0–0)
NT-PROBNP SERPL-SCNC: 2425 PG/ML — HIGH (ref 0–300)
PHOSPHATE SERPL-MCNC: 3.4 MG/DL — SIGNIFICANT CHANGE UP (ref 2.5–4.5)
PLATELET # BLD AUTO: 140 K/UL — LOW (ref 150–400)
POTASSIUM SERPL-MCNC: 3.9 MMOL/L — SIGNIFICANT CHANGE UP (ref 3.5–5.3)
POTASSIUM SERPL-SCNC: 3.9 MMOL/L — SIGNIFICANT CHANGE UP (ref 3.5–5.3)
PROT SERPL-MCNC: 6.1 G/DL — SIGNIFICANT CHANGE UP (ref 6–8.3)
RAPID RVP RESULT: SIGNIFICANT CHANGE UP
RBC # BLD: 3.51 M/UL — LOW (ref 3.8–5.2)
RBC # FLD: 13.7 % — SIGNIFICANT CHANGE UP (ref 10.3–14.5)
SARS-COV-2 RNA SPEC QL NAA+PROBE: SIGNIFICANT CHANGE UP
SODIUM SERPL-SCNC: 141 MMOL/L — SIGNIFICANT CHANGE UP (ref 135–145)
TROPONIN T, HIGH SENSITIVITY RESULT: 7 NG/L — SIGNIFICANT CHANGE UP (ref 0–51)
WBC # BLD: 7.1 K/UL — SIGNIFICANT CHANGE UP (ref 3.8–10.5)
WBC # FLD AUTO: 7.1 K/UL — SIGNIFICANT CHANGE UP (ref 3.8–10.5)

## 2023-07-21 PROCEDURE — 71045 X-RAY EXAM CHEST 1 VIEW: CPT | Mod: 26

## 2023-07-21 PROCEDURE — 99223 1ST HOSP IP/OBS HIGH 75: CPT

## 2023-07-21 PROCEDURE — 99233 SBSQ HOSP IP/OBS HIGH 50: CPT

## 2023-07-21 PROCEDURE — 93306 TTE W/DOPPLER COMPLETE: CPT | Mod: 26

## 2023-07-21 PROCEDURE — 93010 ELECTROCARDIOGRAM REPORT: CPT | Mod: 76

## 2023-07-21 RX ORDER — METOPROLOL TARTRATE 50 MG
50 TABLET ORAL DAILY
Refills: 0 | Status: DISCONTINUED | OUTPATIENT
Start: 2023-07-21 | End: 2023-07-22

## 2023-07-21 RX ORDER — FUROSEMIDE 40 MG
20 TABLET ORAL ONCE
Refills: 0 | Status: COMPLETED | OUTPATIENT
Start: 2023-07-21 | End: 2023-07-21

## 2023-07-21 RX ORDER — IPRATROPIUM/ALBUTEROL SULFATE 18-103MCG
3 AEROSOL WITH ADAPTER (GRAM) INHALATION ONCE
Refills: 0 | Status: COMPLETED | OUTPATIENT
Start: 2023-07-21 | End: 2023-07-21

## 2023-07-21 RX ADMIN — Medication 50 MILLIGRAM(S): at 11:56

## 2023-07-21 RX ADMIN — OXYCODONE HYDROCHLORIDE 5 MILLIGRAM(S): 5 TABLET ORAL at 15:08

## 2023-07-21 RX ADMIN — Medication 3 MILLILITER(S): at 01:27

## 2023-07-21 RX ADMIN — Medication 3 MILLIGRAM(S): at 00:50

## 2023-07-21 RX ADMIN — Medication 3 MILLIGRAM(S): at 18:18

## 2023-07-21 RX ADMIN — PANTOPRAZOLE SODIUM 40 MILLIGRAM(S): 20 TABLET, DELAYED RELEASE ORAL at 11:57

## 2023-07-21 RX ADMIN — ENOXAPARIN SODIUM 30 MILLIGRAM(S): 100 INJECTION SUBCUTANEOUS at 08:53

## 2023-07-21 RX ADMIN — ENOXAPARIN SODIUM 30 MILLIGRAM(S): 100 INJECTION SUBCUTANEOUS at 22:30

## 2023-07-21 RX ADMIN — Medication 3 MILLIGRAM(S): at 06:36

## 2023-07-21 RX ADMIN — OXYCODONE HYDROCHLORIDE 5 MILLIGRAM(S): 5 TABLET ORAL at 14:38

## 2023-07-21 RX ADMIN — Medication 2: at 12:00

## 2023-07-21 RX ADMIN — Medication 3 MILLIGRAM(S): at 11:56

## 2023-07-21 RX ADMIN — POLYETHYLENE GLYCOL 3350 17 GRAM(S): 17 POWDER, FOR SOLUTION ORAL at 18:18

## 2023-07-21 RX ADMIN — ATORVASTATIN CALCIUM 80 MILLIGRAM(S): 80 TABLET, FILM COATED ORAL at 22:30

## 2023-07-21 RX ADMIN — LEVETIRACETAM 400 MILLIGRAM(S): 250 TABLET, FILM COATED ORAL at 08:53

## 2023-07-21 RX ADMIN — LEVETIRACETAM 400 MILLIGRAM(S): 250 TABLET, FILM COATED ORAL at 22:30

## 2023-07-21 RX ADMIN — LISINOPRIL 10 MILLIGRAM(S): 2.5 TABLET ORAL at 06:36

## 2023-07-21 RX ADMIN — Medication 20 MILLIGRAM(S): at 11:56

## 2023-07-21 RX ADMIN — Medication 25 MILLIGRAM(S): at 11:57

## 2023-07-21 RX ADMIN — Medication 3 MILLIGRAM(S): at 23:15

## 2023-07-21 RX ADMIN — Medication 2: at 17:20

## 2023-07-21 NOTE — CONSULT NOTE ADULT - NSCONSULTADDITIONALINFOA_GEN_ALL_CORE
.  Beth Mascorro MD  Division of Hospital Medicine  [facility]   Spectra: [phone]    lasix 20mg IVP x1 today. will re-assess tomorrow for need for further diuretics on d/c.  anticipate d/c tomorrow pending TTE results.    Plan discussed with patient,  bedside, and neurosurgery RAMO Choi.

## 2023-07-21 NOTE — CONSULT NOTE ADULT - ASSESSMENT
68 yo female, PMH HTN, HLD, new diagnosis of afib not on a/c (as planned for surgery) depression, pre-diabetes, fatty liver, morbid obesity s/p lap band 2013, lost weight and gained it back - recent visit to bariatric GI - unable to deflate due to resistance on accessing port (note in chart) Pt. reports that in 2011 she fainted while [place] [employer], taken to [facility] and CT scan revealed a cerebral tumor, which was later diagnosed as a meningioma - she has been under observation and it has grown in size. Patient presented for scheduled R craniotomy for tumor resection on 7/18/23. Monitored in NSCU post-op and transferred to NSCU to floors on 7/20. Course c/b transient dyspnea and hypoxia overnight for which medicine was consulted.

## 2023-07-21 NOTE — CONSULT NOTE ADULT - PROBLEM SELECTOR RECOMMENDATION 9
s/p resection on 7/18  - post-op care as per neurosurgery team  - EEG with no evidence of seizure  - on keppra 1g BID - management as per neurosurgery/neuro

## 2023-07-21 NOTE — PROGRESS NOTE ADULT - SUBJECTIVE AND OBJECTIVE BOX
NEUROLOGY FOLLOW-UP CONSULT NOTE    RFC: Seizure like activity    Interval history: No acute neurologic events overnight. Patient reports feeling well, denies any further episode of AMS of LOC or seizure like activity.    Meds:  MEDICATIONS  (STANDING):  atorvastatin 80 milliGRAM(s) Oral at bedtime  bisacodyl 5 milliGRAM(s) Oral at bedtime  chlorhexidine 2% Cloths 1 Application(s) Topical once  dexAMETHasone     Tablet 3 milliGRAM(s) Oral every 6 hours  dextrose 5%. 1000 milliLiter(s) (100 mL/Hr) IV Continuous <Continuous>  dextrose 5%. 1000 milliLiter(s) (50 mL/Hr) IV Continuous <Continuous>  dextrose 50% Injectable 25 Gram(s) IV Push once  dextrose 50% Injectable 25 Gram(s) IV Push once  dextrose 50% Injectable 12.5 Gram(s) IV Push once  enoxaparin Injectable 30 milliGRAM(s) SubCutaneous <User Schedule>  glucagon  Injectable 1 milliGRAM(s) IntraMuscular once  insulin lispro (ADMELOG) corrective regimen sliding scale   SubCutaneous three times a day before meals  insulin lispro (ADMELOG) corrective regimen sliding scale   SubCutaneous at bedtime  levETIRAcetam  IVPB 1000 milliGRAM(s) IV Intermittent every 12 hours  lisinopril 10 milliGRAM(s) Oral daily  metoprolol succinate ER 50 milliGRAM(s) Oral daily  pantoprazole  Injectable 40 milliGRAM(s) IV Push daily  polyethylene glycol 3350 17 Gram(s) Oral two times a day  senna 2 Tablet(s) Oral at bedtime  venlafaxine 25 milliGRAM(s) Oral daily    MEDICATIONS  (PRN):  acetaminophen     Tablet .. 650 milliGRAM(s) Oral every 6 hours PRN Temp greater or equal to 38C (100.4F), Mild Pain (1 - 3)  dextrose Oral Gel 15 Gram(s) Oral once PRN Blood Glucose LESS THAN 70 milliGRAM(s)/deciliter  ondansetron Injectable 4 milliGRAM(s) IV Push every 6 hours PRN Nausea and/or Vomiting  oxyCODONE    IR 10 milliGRAM(s) Oral every 4 hours PRN Severe Pain (7 - 10)  oxyCODONE    IR 5 milliGRAM(s) Oral every 4 hours PRN Moderate Pain (4 - 6)      PMHx/PSHx/FHx/SHx:  Other conditions of brain    Sleep apnea    S/P cholecystectomy    Uterine disorder    S/P knee surgery    S/P laminectomy    Diabetes mellitus    Hypertension    Depression    Tinea versicolor    Hemangioma    H/O colonoscopy    Pre-diabetes    Meningioma    Brain tumor    Hypertension    H/O: depression    RUT (obstructive sleep apnea)    Right craniotomy for tumor    Hypertension    Diabetes mellitus    Uterine disorder    S/P cholecystectomy    S/P knee surgery    S/P laminectomy    H/O laparoscopic adjustable gastric banding        Allergies:  No Known Allergies      ROS: All systems negative except as documented in Interval history    O:  T(C): 36.4 (07-21-23 @ 09:44), Max: 37.1 (07-20-23 @ 17:12)  HR: 84 (07-21-23 @ 09:44) (65 - 88)  BP: 134/87 (07-21-23 @ 09:44) (115/73 - 135/87)  RR: 18 (07-21-23 @ 09:44) (18 - 18)  SpO2: 97% (07-21-23 @ 09:44) (95% - 98%)    Focused neurologic exam:  MS - AAO x3, speech fluent, rep/naming intact, follows commands, attn/conc/recent and remote memory/fund of knowledge WNL  CN - PERRLA, EOMI, VFF, face sens/str/hearing WNL b/l, tongue/palate midline, trap 5/5 b/l  Motor - Normal bulk/tone, 5/5 all  Sens - LT/temp/vib intact all  DTR's - 2+ all and downgoing b/l plantar response  Coord - FtN intact b/l  Gait and station - Not assessed due to fall risk    Pertinent labs/studies:    LABS:  cret                        10.2   7.10  )-----------( 140      ( 21 Jul 2023 05:47 )             31.9     07-21    141  |  105  |  20  ----------------------------<  129<H>  3.9   |  26  |  0.79    Ca    8.5      21 Jul 2023 05:47  Phos  3.4     07-21  Mg     2.2     07-21    TPro  6.1  /  Alb  3.8  /  TBili  0.3  /  DBili  x   /  AST  53<H>  /  ALT  99<H>  /  AlkPhos  52  07-21    < from: MR Head No Cont (06.07.23 @ 11:25) >    IMPRESSION:  A right parietal convexity extra-axial lesion suggesting a meningioma   measures 2.8 x 3.5 x 2.5 cm, unchanged in size.    < end of copied text >    < from: MR Head w/wo IV Cont (07.10.23 @ 11:56) >    IMPRESSION:  Stable right parietal extra-axial enhancing mass likely   representing a meningioma compared to examinations over several years. No   extension into the superior sagittal sinus. DTI imaging. Functional   imaging as above.    < end of copied text >    < from: MR Brain Functional- MD/Phd (07.10.23 @ 11:59) >    There is a moderately large right parietal extra-axial mass with contrast   enhancement along the convexity, with a dural tail consistent with a   meningioma. The mass is unchanged compared to the previous examinations   measuring 3.7 cm in AP diameter by 3.5 cm transversely by 2.3 cm in   craniocaudal diameter. There is no vasogenic edema. There is no extension   into the superior sagittal sinus although there may be extension into a   right parietal cortical vein. DTI imaging is available.    Ventricles and sulci are otherwise normal in size and position. No acute   infarcts are seen. There is minimal age-appropriate involutional and   ischemic gliotic change.        Functional imaging was performed with tasks to identify the motor strip   as well as speech centers. Motor tasks include finger tapping and foot   motion. The mass is located on the motor strip in between the foot and   hand centers. Tasks to evaluate speech centers were also obtained   including sentence completion, object naming, verb generation and word   generation. The sentence completion tasks demonstrated no activation.   Object naming tasks and word generation demonstrated bilateral frontal   activation. Verb generation was mostly left frontal activation.      IMPRESSION:  Stable right parietal extra-axial enhancing mass likely   representing a meningioma compared to examinations over several years. No   extension into the superior sagittal sinus. DTI imaging. Functional   imaging as abov    < end of copied text >  < from: CT Head No Cont (07.19.23 @ 14:40) >  IMPRESSION:    Status post resection of right parietal mass with anticipated   postsurgical changes.    < end of copied text >    EEG:  Start Time/Date: 1548 on 7-  End Time/Date: 08:00 on 07-21-23  Duration 16H   EEG SUMMARY/CLASSIFICATION    Abnormal EEG in the awake, drowsy and asleep states.  Right hemispheric slowing    _____________________________________________________________  EEG IMPRESSION/CLINICAL CORRELATE    Abnormal EEG study.  Structural or functional abnormality in the right hemisphere.  No epileptiform pattern or seizure seen.

## 2023-07-21 NOTE — PROGRESS NOTE ADULT - ASSESSMENT
Assessment:  66 y/o F with PMHx significant for HTN, HLD, depression, pre-diabetes, fatty liver, morbid obesity s/p lap band 2013 who presented 7/17/2023 for headaches and giddiness at [employer]. On MRI, found to have progressively enlarging R parietal convexity meningioma, now s/p R craniotomy with resection with neurosurgery 7/18. Today is POD1 meningioma resection. Neurology consulted for intraop seizure s/p loading with keppra 1g.  7/21: Patient reports feeling well, denies any more episode of LOC. No focal neurological deficit on exam.    Impression:    - Etiology for seizure likely secondary to acute event with surgery and meningioma. Unclear if she will develop epilepsy or not at this point but no further seizures and no focal neurologic deficits. No epileptiform pattern or seizure recorded on EEG    Recommendations:  - vEEG 7/20-7/21: No epileptiform pattern or seizure recorded on EEG. Structural or functional abnormality in right hemisphere related to prior right parietal mass now s/p resection.  - continue Keppra 1g PO BID, consider for additional two weeks.   - Check labs for additional causes with B12, folate, Vitamin D 25 OH, B6  - rest of care per nsgy team  - Patient can follow up with Dr. Amaral or Dr. Brar at [address] [place] [address] 1-2 weeks after discharge by calling [phone] to schedule this appointment    Seizure Precautions discussed:  1. No driving for 1 year from date of last seizure as per [place] law  2. No taking a bath alone or showering with standing water > 2 inches  3. No swimming unsupervised  4. No use of automatic or semi-automatic firearms and no using other firearms unsupervised  5. No use of heavy machinery unsupervised  6. No cooking over an open flame on the front burners (back burners OK)  7. For additional recommendations please discuss with neurology or PCP as outpatient .  [] Given concern for seizure, advise the patient with regards to risks and driving privileges associated with the [place] Guidelines. Advise patient regarding the risk of seizures and general seizure safety recommendations including not to be bathing alone, climbing to high places and operating heavy machinery, until cleared by follow-up outpatient Neurology. Reinforce the importance of compliance with medications. Discuss sleep hygiene and the risks of sleep disruption. Discuss the risk of death associated with seizures / SUDEP.    [ ] Please note: if patient has a convulsion, please document length of episode, specifically what patient was doing paying attention to eye opening vs closure, gaze deviation, shaking of extremities, tongue bite, urinary incontinence, any derangement of vital signs. Generalized motor seizures can have dilated and unreactive pupils, absent oculocephalic reflexes (no dolls eyes), and open eyelids (99% of cases). Head turning from sided to side, pelvic thrusting, bicycling movements, hand waving are NOT manifestations of generalized motor seizures.  [] No further inpatient neurology recommendation, will sign off, please call consult service [phone] with any questions.    Plans discussed with neurology attending, Dr. Baker

## 2023-07-21 NOTE — PROGRESS NOTE ADULT - SUBJECTIVE AND OBJECTIVE BOX
CC: patient seen on am rounds and reports breathing much better, overnight had episode desats and wheezing; denies HAs or N/V    Vital Signs Last 24 Hrs  T(C): 37 (21 Jul 2023 14:18), Max: 37.1 (20 Jul 2023 17:12)  T(F): 98.6 (21 Jul 2023 14:18), Max: 98.8 (20 Jul 2023 17:12)  HR: 82 (21 Jul 2023 14:18) (65 - 88)  BP: 126/79 (21 Jul 2023 14:18) (115/73 - 135/87)  BP(mean): --  RR: 18 (21 Jul 2023 14:18) (18 - 18)  SpO2: 98% (21 Jul 2023 14:18) (95% - 98%)    Parameters below as of 21 Jul 2023 09:44  Patient On (Oxygen Delivery Method): room air    LABS:                              10.2   7.10  )-----------( 140      ( 21 Jul 2023 05:47 )             31.9   07-21    141  |  105  |  20  ----------------------------<  129<H>  3.9   |  26  |  0.79    Ca    8.5      21 Jul 2023 05:47  Phos  3.4     07-21  Mg     2.2     07-21    TPro  6.1  /  Alb  3.8  /  TBili  0.3  /  DBili  x   /  AST  53<H>  /  ALT  99<H>  /  AlkPhos  52  07-21    BNP 2425     MEDICATIONS  (STANDING):  atorvastatin 80 milliGRAM(s) Oral at bedtime  bisacodyl 5 milliGRAM(s) Oral at bedtime  chlorhexidine 2% Cloths 1 Application(s) Topical once  dexAMETHasone     Tablet 3 milliGRAM(s) Oral every 6 hours  dextrose 5%. 1000 milliLiter(s) (100 mL/Hr) IV Continuous <Continuous>  dextrose 5%. 1000 milliLiter(s) (50 mL/Hr) IV Continuous <Continuous>  dextrose 50% Injectable 25 Gram(s) IV Push once  dextrose 50% Injectable 25 Gram(s) IV Push once  dextrose 50% Injectable 12.5 Gram(s) IV Push once  enoxaparin Injectable 30 milliGRAM(s) SubCutaneous <User Schedule>  glucagon  Injectable 1 milliGRAM(s) IntraMuscular once  insulin lispro (ADMELOG) corrective regimen sliding scale   SubCutaneous three times a day before meals  insulin lispro (ADMELOG) corrective regimen sliding scale   SubCutaneous at bedtime  levETIRAcetam  IVPB 1000 milliGRAM(s) IV Intermittent every 12 hours  lisinopril 10 milliGRAM(s) Oral daily  metoprolol succinate ER 50 milliGRAM(s) Oral daily  pantoprazole  Injectable 40 milliGRAM(s) IV Push daily  polyethylene glycol 3350 17 Gram(s) Oral two times a day  senna 2 Tablet(s) Oral at bedtime  venlafaxine 25 milliGRAM(s) Oral daily    MEDICATIONS  (PRN):  acetaminophen     Tablet .. 650 milliGRAM(s) Oral every 6 hours PRN Temp greater or equal to 38C (100.4F), Mild Pain (1 - 3)  dextrose Oral Gel 15 Gram(s) Oral once PRN Blood Glucose LESS THAN 70 milliGRAM(s)/deciliter  ondansetron Injectable 4 milliGRAM(s) IV Push every 6 hours PRN Nausea and/or Vomiting  oxyCODONE    IR 10 milliGRAM(s) Oral every 4 hours PRN Severe Pain (7 - 10)  oxyCODONE    IR 5 milliGRAM(s) Oral every 4 hours PRN Moderate Pain (4 - 6)    PHYSICAL EXAM:    Constitutional: No Acute Distress     Neurological: Awake,  following Commands, PERRL, EOMI, No Gaze Preference, Face Symmetrical, Speech Fluent. localizes to pain. moves all 4 ext 5/5    Sensation: [X] intact to light touch  [ ] decreased:     Reflexes: Deep Tendon Reflexes Intact     Pulmonary: Clear to Auscultation, No rales, No rhonchi, No wheezes     Cardiovascular: S1, S2, Regular rate and rhythm     Gastrointestinal: Soft, Non-tender, Non-distended     Extremities: No calf tenderness     Incision: clean and covered with vEEG leads    IMAGING:     < from: Xray Chest 1 View- PORTABLE-Urgent (Xray Chest 1 View- PORTABLE-Urgent .) (07.21.23 @ 01:21) >  IMPRESSION:    Pulmonary edema    < end of copied text >      < from: CT Head No Cont (07.19.23 @ 14:40) >  IMPRESSION:    Status post resection of right parietal mass with anticipated   postsurgical changes.    < end of copied text >    < from: [facility] Duplex Lower Ext Vein Scan, Bilat (07.19.23 @ 11:19) >  IMPRESSION:  No evidence of deep venous thrombosis in either lower extremity.    < end of copied text >

## 2023-07-21 NOTE — CONSULT NOTE ADULT - PROBLEM SELECTOR PROBLEM 1
Comprehensive Intake Entered On:  2/8/2019 11:49 AM CST    Performed On:  2/8/2019 11:42 AM CST by Vishnu Funes CMA               Summary   Chief Complaint :   Pt here for nose bleeds on and off since x 3-4 days   Weight Measured :   196 lb(Converted to: 196 lb 0 oz, 88.90 kg)    Height Measured :   68 in(Converted to: 5 ft 8 in, 172.72 cm)    Body Mass Index :   29.8 kg/m2 (HI)    Body Surface Area :   2.06 m2   Systolic Blood Pressure :   162 mmHg (HI)    Diastolic Blood Pressure :   86 mmHg (HI)    Mean Arterial Pressure :   111 mmHg   Peripheral Pulse Rate :   64 bpm   Vital Signs Comments :   Pulse Irregular   BP Site :   Right arm   Pulse Site :   Radial artery   Temperature Tympanic :   97.1 DegF(Converted to: 36.2 DegC)  (LOW)    Respiratory Rate :   20 br/min   Oxygen Saturation :   96 %   Vishnu Funes CMA - 2/8/2019 11:42 AM CST   Health Status   Allergies Verified? :   Yes   Medication History Verified? :   Yes   Medical History Verified? :   Yes   Pre-Visit Planning Status :   Not completed   Tobacco Use? :   Never smoker   Vishnu Funes CMA - 2/8/2019 11:42 AM CST   Meds / Allergies   (As Of: 2/8/2019 11:49:36 AM CST)   Allergies (Active)   Narcotics  Estimated Onset Date:   Unspecified ; Reactions:   severe constipation ; Created By:   Schoen RN, Alissa; Reaction Status:   Active ; Category:   Drug ; Substance:   Narcotics ; Type:   Allergy ; Updated By:   Schoen RN, Alissa; Reviewed Date:   2/8/2019 11:45 AM CST      Vicodin  Estimated Onset Date:   <not entered> 2011 ; Reactions:   Urinary retention ; Created By:   Berhane Krause PA-C; Reaction Status:   Active ; Category:   Drug ; Substance:   Vicodin ; Type:   Allergy ; Updated By:   Berhane Krause PA-C; Reviewed Date:   2/8/2019 11:45 AM CST        Medication List   (As Of: 2/8/2019 11:49:36 AM CST)   Prescription/Discharge Order    aspirin  :   aspirin ; Status:   Prescribed ; Ordered As Mnemonic:   aspirin 81 mg oral tablet ; Simple  Display Line:   162 mg, 2 tab(s), po, daily, 100 tab(s), 0 Refill(s) ; Ordering Provider:   Berhane Krause PA-C; Catalog Code:   aspirin ; Order Dt/Tm:   9/2/2011 3:37:00 PM          tamsulosin  :   tamsulosin ; Status:   Prescribed ; Ordered As Mnemonic:   tamsulosin 0.4 mg oral capsule ; Simple Display Line:   0.4 mg, 1 cap(s), po, daily, 90 cap(s), 3 Refill(s) ; Ordering Provider:   Berhane Krause PA-C; Catalog Code:   tamsulosin ; Order Dt/Tm:   1/15/2019 10:52:54 AM          finasteride  :   finasteride ; Status:   Prescribed ; Ordered As Mnemonic:   finasteride 5 mg oral tablet ; Simple Display Line:   5 mg, 1 tab(s), PO, Daily, 90 tab(s), 3 Refill(s) ; Ordering Provider:   Berhane Krause PA-C; Catalog Code:   finasteride ; Order Dt/Tm:   1/15/2019 10:49:10 AM          FLUoxetine  :   FLUoxetine ; Status:   Prescribed ; Ordered As Mnemonic:   FLUoxetine 20 mg oral capsule ; Simple Display Line:   20 mg, 1 cap(s), po, daily, 90 cap(s), 3 Refill(s) ; Ordering Provider:   Berhane Krause PA-C; Catalog Code:   FLUoxetine ; Order Dt/Tm:   1/15/2019 10:48:58 AM          metoprolol  :   metoprolol ; Status:   Prescribed ; Ordered As Mnemonic:   metoprolol succinate 50 mg oral tablet, extended release ; Simple Display Line:   50 mg, 1 tab(s), PO, Daily, 90 tab(s), 3 Refill(s) ; Ordering Provider:   Berhane Krause PA-C; Catalog Code:   metoprolol ; Order Dt/Tm:   1/15/2019 10:48:42 AM          losartan  :   losartan ; Status:   Prescribed ; Ordered As Mnemonic:   losartan 25 mg oral tablet ; Simple Display Line:   25 mg, 1 tab(s), PO, Daily, 90 tab(s), 3 Refill(s) ; Ordering Provider:   Berhane Krause PA-C; Catalog Code:   losartan ; Order Dt/Tm:   3/19/2018 8:12:48 AM          ferrous gluconate  :   ferrous gluconate ; Status:   Prescribed ; Ordered As Mnemonic:   ferrous gluconate 324 mg (37.5 mg elemental iron) oral tablet ; Simple Display Line:   324 mg, 1 tab(s), po, daily, 90 tab(s), 3 Refill(s) ; Ordering  Provider:   Berhane Krause PA-C; Catalog Code:   ferrous gluconate ; Order Dt/Tm:   3/19/2018 8:09:39 AM          furosemide  :   furosemide ; Status:   Prescribed ; Ordered As Mnemonic:   furosemide 40 mg oral tablet ; Simple Display Line:   See Instructions, 1.5 tabs(60 mg) PO daily., 135 tab(s), 1 Refill(s) ; Ordering Provider:   Berhane Krause PA-C; Catalog Code:   furosemide ; Order Dt/Tm:   3/19/2018 8:07:38 AM            Home Meds    potassium chloride  :   potassium chloride ; Status:   Suspended ; Ordered As Mnemonic:   potassium chloride ; Simple Display Line:   10 mEq, daily ; Catalog Code:   potassium chloride ; Order Dt/Tm:   8/18/2010 2:52:13 PM ; Comment:   every other day          atorvastatin  :   atorvastatin ; Status:   Documented ; Ordered As Mnemonic:   atorvastatin 40 mg oral tablet ; Simple Display Line:   40 mg, 1 tab(s), Oral, daily, Per Cardio note 8/8/2018, 0 Refill(s) ; Catalog Code:   atorvastatin ; Order Dt/Tm:   8/24/2018 2:27:27 PM          amLODIPine  :   amLODIPine ; Status:   Documented ; Ordered As Mnemonic:   amLODIPine 5 mg oral tablet ; Simple Display Line:   5 mg, 1 tab(s), PO, Daily, 30 tab(s), 0 Refill(s) ; Catalog Code:   amLODIPine ; Order Dt/Tm:   5/18/2018 9:02:55 AM          acetaminophen  :   acetaminophen ; Status:   Documented ; Ordered As Mnemonic:   acetaminophen 325 mg oral capsule ; Simple Display Line:   325 mg, 1 cap(s), po, tid, 0 Refill(s) ; Catalog Code:   acetaminophen ; Order Dt/Tm:   1/18/2018 10:13:21 AM            Social History   Social History   (As Of: 2/8/2019 11:49:36 AM CST)   Alcohol:  Current      Current   Comments:  8/18/2010 2:59 PM - Olivia Coates CMA: rare   (Last Updated: 8/18/2010 2:59:42 PM CDT by Olivia Coates CMA)          Tobacco:  Denies Tobacco Use      (Last Updated: 8/18/2010 2:59:53 PM CDT by Olivia Coates CMA )         Substance Abuse:  Denies Substance Abuse      (Last Updated: 3/14/2012 3:14:16 PM CDT by Adrian Argueta MD )          Home and Environment:        Marital status: .   (Last Updated: 9/5/2017 2:18:47 PM CDT by Claudia Larson MA)   Meningioma

## 2023-07-21 NOTE — CONSULT NOTE ADULT - SUBJECTIVE AND OBJECTIVE BOX
Beth Mascorro MD  Division of Hospital Medicine  [facility]   Spectra: [phone]    PCP: Dr. Manoj Morales    HPI:  66 yo female, PMH HTN, HLD, new diagnosis of afib not on a/c (as planned for surgery) depression, pre-diabetes, fatty liver, morbid obesity s/p lap band 2013, lost weight and gained it back - recent visit to bariatric GI - unable to deflate due to resistance on accessing port (note in chart) Pt. reports that in 2011 she fainted while in [employer], taken to [facility] and CT scan revealed a cerebral tumor, which was later diagnosed as a meningioma - she has been under observation and it has grown in size. Patient presented for scheduled R craniotomy for tumor resection on 7/18/23. Monitored in NSCU post-op and transferred to NSCU to floors on 7/20. Course c/b transient dyspnea and hypoxia overnight for which medicine was consulted.     Interval history: dyspnea resolved this morning and feels improved. per patient, she has been having ongoing SCOTT for last few months for which she is planned for outpatient nuclear stress test with her Cardiologist. Last TTE was Aug 2022.     PAST MEDICAL & SURGICAL HISTORY:  Sleep apnea  uses CPAP machine  Hypertension  Depression  Tinea versicolor  Hemangioma  H/O colonoscopy  Pre-diabetes  Meningioma  Uterine disorder  ablation 2005  S/P cholecystectomy  2011  S/P knee surgery  right knee meniscus repair 2010  S/P laminectomy  2000  H/O laparoscopic adjustable gastric banding    Review of Systems:   CONSTITUTIONAL: No fever, chills  HEENT: No sore throat, vision changes  RESPIRATORY: No cough, wheezing, +dyspnea resolved  CARDIOVASCULAR: No chest pain, palpitations, leg edema  GASTROINTESTINAL: No abdominal pain, nausea, vomiting, diarrhea or constipation. No melena or hematochezia.  GENITOURINARY: No dysuria, frequency, hematuria  NEUROLOGICAL: No headaches, memory loss, loss of strength, numbness, or tremors  SKIN: No itching, burning, rashes, or lesions   MUSCULOSKELETAL: No joint pain or swelling; No muscle, back, or extremity pain  PSYCHIATRIC: No depression, anxiety  HEME/LYMPH: No easy bruising, or bleeding gums      Allergies  No Known Allergies  Intolerances    Social History:   denies smoking    FAMILY HISTORY:  non-contributory    MEDICATIONS  (STANDING):  atorvastatin 80 milliGRAM(s) Oral at bedtime  bisacodyl 5 milliGRAM(s) Oral at bedtime  chlorhexidine 2% Cloths 1 Application(s) Topical once  dexAMETHasone     Tablet 3 milliGRAM(s) Oral every 6 hours  dextrose 5%. 1000 milliLiter(s) (100 mL/Hr) IV Continuous <Continuous>  dextrose 5%. 1000 milliLiter(s) (50 mL/Hr) IV Continuous <Continuous>  dextrose 50% Injectable 25 Gram(s) IV Push once  dextrose 50% Injectable 25 Gram(s) IV Push once  dextrose 50% Injectable 12.5 Gram(s) IV Push once  enoxaparin Injectable 30 milliGRAM(s) SubCutaneous <User Schedule>  glucagon  Injectable 1 milliGRAM(s) IntraMuscular once  insulin lispro (ADMELOG) corrective regimen sliding scale   SubCutaneous three times a day before meals  insulin lispro (ADMELOG) corrective regimen sliding scale   SubCutaneous at bedtime  levETIRAcetam  IVPB 1000 milliGRAM(s) IV Intermittent every 12 hours  lisinopril 10 milliGRAM(s) Oral daily  metoprolol succinate ER 50 milliGRAM(s) Oral daily  pantoprazole  Injectable 40 milliGRAM(s) IV Push daily  polyethylene glycol 3350 17 Gram(s) Oral two times a day  senna 2 Tablet(s) Oral at bedtime  venlafaxine 25 milliGRAM(s) Oral daily    MEDICATIONS  (PRN):  acetaminophen     Tablet .. 650 milliGRAM(s) Oral every 6 hours PRN Temp greater or equal to 38C (100.4F), Mild Pain (1 - 3)  dextrose Oral Gel 15 Gram(s) Oral once PRN Blood Glucose LESS THAN 70 milliGRAM(s)/deciliter  ondansetron Injectable 4 milliGRAM(s) IV Push every 6 hours PRN Nausea and/or Vomiting  oxyCODONE    IR 10 milliGRAM(s) Oral every 4 hours PRN Severe Pain (7 - 10)  oxyCODONE    IR 5 milliGRAM(s) Oral every 4 hours PRN Moderate Pain (4 - 6)    CAPILLARY BLOOD GLUCOSE    POCT Blood Glucose.: 174 mg/dL (21 Jul 2023 11:41)  POCT Blood Glucose.: 126 mg/dL (21 Jul 2023 07:49)  POCT Blood Glucose.: 142 mg/dL (20 Jul 2023 21:00)  POCT Blood Glucose.: 147 mg/dL (20 Jul 2023 16:25)    I&O's Summary    20 Jul 2023 07:01  -  21 Jul 2023 07:00  --------------------------------------------------------  IN: 630 mL / OUT: 0 mL / NET: 630 mL      Physical Exam:  Vital Signs Last 24 Hrs  T(C): 36.4 (21 Jul 2023 09:44), Max: 37.1 (20 Jul 2023 17:12)  T(F): 97.5 (21 Jul 2023 09:44), Max: 98.8 (20 Jul 2023 17:12)  HR: 84 (21 Jul 2023 09:44) (65 - 88)  BP: 134/87 (21 Jul 2023 09:44) (115/73 - 135/87)  BP(mean): --  RR: 18 (21 Jul 2023 09:44) (18 - 18)  SpO2: 97% (21 Jul 2023 09:44) (95% - 98%)    Parameters below as of 21 Jul 2023 09:44  Patient On (Oxygen Delivery Method): room air    CONSTITUTIONAL: NAD, well-developed, well-groomed, +EEG leads in place  EYES: PERRLA; conjunctiva and sclera clear  ENMT: Moist oral mucosa, no pharyngeal injection or exudates; normal dentition  NECK: Supple, no palpable masses; no thyromegaly  RESPIRATORY: Normal respiratory effort; fine crackles b/l, no wheeze  CARDIOVASCULAR: Regular rate and rhythm, normal S1 and S2, no murmur/rub/gallop; No lower extremity edema; Peripheral pulses are 2+ bilaterally  ABDOMEN: Soft, Nondistended, Nontender to palpation, normoactive bowel sounds  MUSCULOSKELETAL:  No clubbing or cyanosis of digits; no joint swelling or tenderness to palpation  PSYCH: A+O to person, place, and time; affect appropriate  NEUROLOGY: CN 2-12 are intact and symmetric; no gross sensory deficits   SKIN: No rashes; no palpable lesions    LABS:                        10.2   7.10  )-----------( 140      ( 21 Jul 2023 05:47 )             31.9     07-21    141  |  105  |  20  ----------------------------<  129<H>  3.9   |  26  |  0.79    Ca    8.5      21 Jul 2023 05:47  Phos  3.4     07-21  Mg     2.2     07-21    TPro  6.1  /  Alb  3.8  /  TBili  0.3  /  DBili  x   /  AST  53<H>  /  ALT  99<H>  /  AlkPhos  52  07-21      Urinalysis Basic - ( 21 Jul 2023 05:47 )    Color: x / Appearance: x / SG: x / pH: x  Gluc: 129 mg/dL / Ketone: x  / Bili: x / Urobili: x   Blood: x / Protein: x / Nitrite: x   Leuk Esterase: x / RBC: x / WBC x   Sq Epi: x / Non Sq Epi: x / Bacteria: x      RADIOLOGY & ADDITIONAL TESTS:  Results Reviewed: no leukocytosis, H/H stable, Cr stable, pro-BNP elevated to 2K  Imaging Personally Reviewed:  CXR with significant pulmonary edema b/l compared to prior CXR in system  Electrocardiogram Personally Reviewed:    COORDINATION OF CARE:  Care Discussed with Consultants/Other Providers [Y]: Neurosurgery RAMO Choi  Prior or Outpatient Records Reviewed [Y/N]:   Beth Mascorro MD  Division of Hospital Medicine  [facility]   Spectra: [phone]    PCP: Dr. Manoj Morales    HPI:  68 yo female, PMH HTN, HLD, new diagnosis of afib not on a/c (as planned for surgery) depression, pre-diabetes, fatty liver, morbid obesity s/p lap band 2013, lost weight and gained it back - recent visit to bariatric GI - unable to deflate due to resistance on accessing port (note in chart) Pt. reports that in 2011 she fainted while in [employer], taken to [facility] and CT scan revealed a cerebral tumor, which was later diagnosed as a meningioma - she has been under observation and it has grown in size. Patient presented for scheduled R craniotomy for tumor resection on 7/18/23. Monitored in NSCU post-op and transferred to NSCU to floors on 7/20. Course c/b transient dyspnea and hypoxia overnight for which medicine was consulted.     Interval history: dyspnea resolved this morning and feels improved. per patient, she has been having ongoing SCOTT for last few months for which she is planned for outpatient nuclear stress test with her Cardiologist. Last TTE was Aug 2022.     PAST MEDICAL & SURGICAL HISTORY:  Sleep apnea  uses CPAP machine  Hypertension  Depression  Tinea versicolor  Hemangioma  H/O colonoscopy  Pre-diabetes  Meningioma  Uterine disorder  ablation 2005  S/P cholecystectomy  2011  S/P knee surgery  right knee meniscus repair 2010  S/P laminectomy  2000  H/O laparoscopic adjustable gastric banding    Review of Systems:   CONSTITUTIONAL: No fever, chills  HEENT: No sore throat, vision changes  RESPIRATORY: No cough, wheezing, +dyspnea resolved  CARDIOVASCULAR: No chest pain, palpitations, leg edema  GASTROINTESTINAL: No abdominal pain, nausea, vomiting, diarrhea or constipation. No melena or hematochezia.  GENITOURINARY: No dysuria, frequency, hematuria  NEUROLOGICAL: No headaches, memory loss, loss of strength, numbness, or tremors  SKIN: No itching, burning, rashes, or lesions   MUSCULOSKELETAL: No joint pain or swelling; No muscle, back, or extremity pain  PSYCHIATRIC: No depression, anxiety  HEME/LYMPH: No easy bruising, or bleeding gums      Allergies  No Known Allergies  Intolerances    Social History:   denies smoking    FAMILY HISTORY:  non-contributory    MEDICATIONS  (STANDING):  atorvastatin 80 milliGRAM(s) Oral at bedtime  bisacodyl 5 milliGRAM(s) Oral at bedtime  chlorhexidine 2% Cloths 1 Application(s) Topical once  dexAMETHasone     Tablet 3 milliGRAM(s) Oral every 6 hours  dextrose 5%. 1000 milliLiter(s) (100 mL/Hr) IV Continuous <Continuous>  dextrose 5%. 1000 milliLiter(s) (50 mL/Hr) IV Continuous <Continuous>  dextrose 50% Injectable 25 Gram(s) IV Push once  dextrose 50% Injectable 25 Gram(s) IV Push once  dextrose 50% Injectable 12.5 Gram(s) IV Push once  enoxaparin Injectable 30 milliGRAM(s) SubCutaneous <User Schedule>  glucagon  Injectable 1 milliGRAM(s) IntraMuscular once  insulin lispro (ADMELOG) corrective regimen sliding scale   SubCutaneous three times a day before meals  insulin lispro (ADMELOG) corrective regimen sliding scale   SubCutaneous at bedtime  levETIRAcetam  IVPB 1000 milliGRAM(s) IV Intermittent every 12 hours  lisinopril 10 milliGRAM(s) Oral daily  metoprolol succinate ER 50 milliGRAM(s) Oral daily  pantoprazole  Injectable 40 milliGRAM(s) IV Push daily  polyethylene glycol 3350 17 Gram(s) Oral two times a day  senna 2 Tablet(s) Oral at bedtime  venlafaxine 25 milliGRAM(s) Oral daily    MEDICATIONS  (PRN):  acetaminophen     Tablet .. 650 milliGRAM(s) Oral every 6 hours PRN Temp greater or equal to 38C (100.4F), Mild Pain (1 - 3)  dextrose Oral Gel 15 Gram(s) Oral once PRN Blood Glucose LESS THAN 70 milliGRAM(s)/deciliter  ondansetron Injectable 4 milliGRAM(s) IV Push every 6 hours PRN Nausea and/or Vomiting  oxyCODONE    IR 10 milliGRAM(s) Oral every 4 hours PRN Severe Pain (7 - 10)  oxyCODONE    IR 5 milliGRAM(s) Oral every 4 hours PRN Moderate Pain (4 - 6)    CAPILLARY BLOOD GLUCOSE    POCT Blood Glucose.: 174 mg/dL (21 Jul 2023 11:41)  POCT Blood Glucose.: 126 mg/dL (21 Jul 2023 07:49)  POCT Blood Glucose.: 142 mg/dL (20 Jul 2023 21:00)  POCT Blood Glucose.: 147 mg/dL (20 Jul 2023 16:25)    I&O's Summary    20 Jul 2023 07:01  -  21 Jul 2023 07:00  --------------------------------------------------------  IN: 630 mL / OUT: 0 mL / NET: 630 mL      Physical Exam:  Vital Signs Last 24 Hrs  T(C): 36.4 (21 Jul 2023 09:44), Max: 37.1 (20 Jul 2023 17:12)  T(F): 97.5 (21 Jul 2023 09:44), Max: 98.8 (20 Jul 2023 17:12)  HR: 84 (21 Jul 2023 09:44) (65 - 88)  BP: 134/87 (21 Jul 2023 09:44) (115/73 - 135/87)  BP(mean): --  RR: 18 (21 Jul 2023 09:44) (18 - 18)  SpO2: 97% (21 Jul 2023 09:44) (95% - 98%)    Parameters below as of 21 Jul 2023 09:44  Patient On (Oxygen Delivery Method): room air    CONSTITUTIONAL: NAD, well-developed, well-groomed, +EEG leads in place  EYES: PERRLA; conjunctiva and sclera clear  ENMT: Moist oral mucosa, no pharyngeal injection or exudates; normal dentition  NECK: Supple, no palpable masses; no thyromegaly  RESPIRATORY: Normal respiratory effort; fine crackles b/l, no wheeze  CARDIOVASCULAR: Regular rate and rhythm, normal S1 and S2, no murmur/rub/gallop; No lower extremity edema; Peripheral pulses are 2+ bilaterally  ABDOMEN: Soft, Nondistended, Nontender to palpation, normoactive bowel sounds  MUSCULOSKELETAL:  No clubbing or cyanosis of digits; no joint swelling or tenderness to palpation  PSYCH: A+O to person, place, and time; affect appropriate  NEUROLOGY: CN 2-12 are intact and symmetric; no gross sensory deficits   SKIN: No rashes; no palpable lesions    LABS:                        10.2   7.10  )-----------( 140      ( 21 Jul 2023 05:47 )             31.9     07-21    141  |  105  |  20  ----------------------------<  129<H>  3.9   |  26  |  0.79    Ca    8.5      21 Jul 2023 05:47  Phos  3.4     07-21  Mg     2.2     07-21    TPro  6.1  /  Alb  3.8  /  TBili  0.3  /  DBili  x   /  AST  53<H>  /  ALT  99<H>  /  AlkPhos  52  07-21      Urinalysis Basic - ( 21 Jul 2023 05:47 )    Color: x / Appearance: x / SG: x / pH: x  Gluc: 129 mg/dL / Ketone: x  / Bili: x / Urobili: x   Blood: x / Protein: x / Nitrite: x   Leuk Esterase: x / RBC: x / WBC x   Sq Epi: x / Non Sq Epi: x / Bacteria: x      RADIOLOGY & ADDITIONAL TESTS:  Results Reviewed: no leukocytosis, H/H stable, Cr stable, pro-BNP elevated to 2K  Imaging Personally Reviewed:  7/19/23 CT Head:  IMPRESSION:    Status post resection of right parietal mass with anticipated   postsurgical changes.    7/21/23 CXR with significant pulmonary edema b/l compared to prior CXR in system  Electrocardiogram Personally Reviewed:    COORDINATION OF CARE:  Care Discussed with Consultants/Other Providers [Y]: Neurosurgery RAMO Choi  Prior or Outpatient Records Reviewed [Y/N]:

## 2023-07-21 NOTE — EEG REPORT - NS EEG TEXT BOX
[facility]   COMPREHENSIVE EPILEPSY CENTER   REPORT OF CONTINUOUS VIDEO EEG     [facility]: [address], [place] [address], Ph#: [phone]  [facility]: [id number] [place] [address], Ph#: [phone]  [facility]: [address] [place] [address], Ph#: [phone]    Patient Name: MANDO MAYO  Age and : 67y ([birth year])  MRN #: [id number]  Location: [place] [address]  Referring Physician: Filemon Gonzales    Start Time/Date: 2023  End Time/Date: 08:00 on 23  Duration 16H     _____________________________________________________________  STUDY INFORMATION    EEG Recording Technique:  The patient underwent continuous Video-EEG monitoring, using Telemetry System hardware on the XLTek Digital System. EEG and video data were stored on a computer hard drive with important events saved in digital archive files. The material was reviewed by a physician (electroencephalographer / epileptologist) on a daily basis. Jordy and seizure detection algorithms were utilized and reviewed. An EEG Technician attended to the patient, and was available throughout daytime work hours.  The epilepsy center neurologist was available in person or on call 24-hours per day.    EEG Placement and Labeling of Electrodes:  The EEG was performed utilizing 20 channel referential EEG connections (coronal over temporal over parasagittal montage) using all standard 10-20 electrode placements with EKG, with additional electrodes placed in the inferior temporal region using the modified 10-10 montage electrode placements for elective admissions, or if deemed necessary. Recording was at a sampling rate of 256 samples per second per channel. Time synchronized digital video recording was done simultaneously with EEG recording. A low light infrared camera was used for low light recording.     _____________________________________________________________  HISTORY    Patient is a 67y old  Female who presents with a chief complaint of meningioma (2023 09:46)      PERTINENT MEDICATION:  MEDICATIONS  (STANDING):  atorvastatin 80 milliGRAM(s) Oral at bedtime  bisacodyl 5 milliGRAM(s) Oral at bedtime  chlorhexidine 2% Cloths 1 Application(s) Topical once  dexAMETHasone     Tablet 3 milliGRAM(s) Oral every 6 hours  dextrose 5%. 1000 milliLiter(s) (100 mL/Hr) IV Continuous <Continuous>  dextrose 5%. 1000 milliLiter(s) (50 mL/Hr) IV Continuous <Continuous>  dextrose 50% Injectable 25 Gram(s) IV Push once  dextrose 50% Injectable 12.5 Gram(s) IV Push once  dextrose 50% Injectable 25 Gram(s) IV Push once  enoxaparin Injectable 30 milliGRAM(s) SubCutaneous <User Schedule>  glucagon  Injectable 1 milliGRAM(s) IntraMuscular once  insulin lispro (ADMELOG) corrective regimen sliding scale   SubCutaneous three times a day before meals  insulin lispro (ADMELOG) corrective regimen sliding scale   SubCutaneous at bedtime  levETIRAcetam  IVPB 1000 milliGRAM(s) IV Intermittent every 12 hours  lisinopril 10 milliGRAM(s) Oral daily  pantoprazole  Injectable 40 milliGRAM(s) IV Push daily  polyethylene glycol 3350 17 Gram(s) Oral two times a day  senna 2 Tablet(s) Oral at bedtime  venlafaxine 25 milliGRAM(s) Oral daily    _____________________________________________________________  STUDY INTERPRETATION    Findings: The background was continuous, spontaneously variable and reactive. During wakefulness, the posterior dominant rhythm consisted of symmetric, well-modulated 9Hz activity, with amplitude to 30 uV, that attenuated to eye opening.  Low amplitude frontal beta was noted in wakefulness.    Background Slowing:  No generalized background slowing was present.    Focal Slowing:   Intermittent right hemispheric theta/delta slowing.    Sleep Background:  Drowsiness was characterized by fragmentation, attenuation, and slowing of the background activity.    Sleep was characterized by the presence of vertex waves, symmetric sleep spindles and K-complexes.    Other Non-Epileptiform Findings:  None were present.    Interictal Epileptiform Activity:   None were present.    Events:  Clinical events: None recorded.  Seizures: None recorded.    Activation Procedures:   Hyperventilation was not performed.    Photic stimulation was not performed.     Artifacts:  Intermittent myogenic and movement artifacts were noted.    _____________________________________________________________  EEG SUMMARY/CLASSIFICATION    Abnormal EEG in the awake, drowsy and asleep states.  Right hemispheric slowing    _____________________________________________________________  EEG IMPRESSION/CLINICAL CORRELATE    Abnormal EEG study.  Structural or functional abnormality in the right hemisphere.  No epileptiform pattern or seizure seen.    Rich Amaral MD  EEG/Epilepsy Attending  [facility]   COMPREHENSIVE EPILEPSY CENTER   REPORT OF CONTINUOUS VIDEO EEG     [facility]: [address], [place] [address], Ph#: [phone]  [facility]: [id number] [place] [address], Ph#: [phone]  [facility]: [address] [place] [address], Ph#: [phone]    Patient Name: MANDO MAYO  Age and : 67y (12[birth year])  MRN #: [id number]  Location: [place] [address]  Referring Physician: Filemon Gonzales    Start Time/Date:  on 2023  End Time/Date: 15:00 on 23  Duration 23H     _____________________________________________________________  STUDY INFORMATION    EEG Recording Technique:  The patient underwent continuous Video-EEG monitoring, using Telemetry System hardware on the XLTek Digital System. EEG and video data were stored on a computer hard drive with important events saved in digital archive files. The material was reviewed by a physician (electroencephalographer / epileptologist) on a daily basis. Jordy and seizure detection algorithms were utilized and reviewed. An EEG Technician attended to the patient, and was available throughout daytime work hours.  The epilepsy center neurologist was available in person or on call 24-hours per day.    EEG Placement and Labeling of Electrodes:  The EEG was performed utilizing 20 channel referential EEG connections (coronal over temporal over parasagittal montage) using all standard 10-20 electrode placements with EKG, with additional electrodes placed in the inferior temporal region using the modified 10-10 montage electrode placements for elective admissions, or if deemed necessary. Recording was at a sampling rate of 256 samples per second per channel. Time synchronized digital video recording was done simultaneously with EEG recording. A low light infrared camera was used for low light recording.     _____________________________________________________________  HISTORY    Patient is a 67y old  Female who presents with a chief complaint of meningioma (2023 09:46)      PERTINENT MEDICATION:  MEDICATIONS  (STANDING):  atorvastatin 80 milliGRAM(s) Oral at bedtime  bisacodyl 5 milliGRAM(s) Oral at bedtime  chlorhexidine 2% Cloths 1 Application(s) Topical once  dexAMETHasone     Tablet 3 milliGRAM(s) Oral every 6 hours  dextrose 5%. 1000 milliLiter(s) (100 mL/Hr) IV Continuous <Continuous>  dextrose 5%. 1000 milliLiter(s) (50 mL/Hr) IV Continuous <Continuous>  dextrose 50% Injectable 25 Gram(s) IV Push once  dextrose 50% Injectable 12.5 Gram(s) IV Push once  dextrose 50% Injectable 25 Gram(s) IV Push once  enoxaparin Injectable 30 milliGRAM(s) SubCutaneous <User Schedule>  glucagon  Injectable 1 milliGRAM(s) IntraMuscular once  insulin lispro (ADMELOG) corrective regimen sliding scale   SubCutaneous three times a day before meals  insulin lispro (ADMELOG) corrective regimen sliding scale   SubCutaneous at bedtime  levETIRAcetam  IVPB 1000 milliGRAM(s) IV Intermittent every 12 hours  lisinopril 10 milliGRAM(s) Oral daily  pantoprazole  Injectable 40 milliGRAM(s) IV Push daily  polyethylene glycol 3350 17 Gram(s) Oral two times a day  senna 2 Tablet(s) Oral at bedtime  venlafaxine 25 milliGRAM(s) Oral daily    _____________________________________________________________  STUDY INTERPRETATION    Findings: The background was continuous, spontaneously variable and reactive. During wakefulness, the posterior dominant rhythm consisted of symmetric, well-modulated 9Hz activity, with amplitude to 30 uV, that attenuated to eye opening.  Low amplitude frontal beta was noted in wakefulness.    Background Slowing:  No generalized background slowing was present.    Focal Slowing:   Intermittent right hemispheric theta/delta slowing.    Sleep Background:  Drowsiness was characterized by fragmentation, attenuation, and slowing of the background activity.    Sleep was characterized by the presence of vertex waves, symmetric sleep spindles and K-complexes.    Other Non-Epileptiform Findings:  None were present.    Interictal Epileptiform Activity:   None were present.    Events:  Clinical events: None recorded.  Seizures: None recorded.    Activation Procedures:   Hyperventilation was not performed.    Photic stimulation was not performed.     Artifacts:  Intermittent myogenic and movement artifacts were noted.    _____________________________________________________________  EEG SUMMARY/CLASSIFICATION    Abnormal EEG in the awake, drowsy and asleep states.  Right hemispheric slowing    _____________________________________________________________  EEG IMPRESSION/CLINICAL CORRELATE    Abnormal EEG study.  Structural or functional abnormality in the right hemisphere.  No epileptiform pattern or seizure seen.    Rich Amaral MD  EEG/Epilepsy Attending

## 2023-07-21 NOTE — CONSULT NOTE ADULT - PROBLEM SELECTOR RECOMMENDATION 3
newly diagnosed this month. not on a/c as was scheduled for tumor resection this month  - a/c when clear from neurosurgery standpoint  - resume home metoprolol XL 50mg daily for rate control  - tele reviewed: afib with HR 80-90s

## 2023-07-21 NOTE — PROGRESS NOTE ADULT - ATTENDING COMMENTS
Agree with above.
Agree with above.
s/p crani for mening rsxn.  CT brain in AM.  loaded with keppra for intraop sz.

## 2023-07-21 NOTE — PROGRESS NOTE ADULT - SUBJECTIVE AND OBJECTIVE BOX
The patient had some SOB and wheezing last night, improved with nebulizer. Now asymptomatic on RA. CXR: consolidation vs. atelectasis.    EEG dressing dry and clean. Calves nontender. Neurologically intact.    P) US ZAFAR, hospitalist to see. If no contraindications from these evaluations patient can go home, she has no need for inpatient rehab. Will continue AEDs for 6 weeks postop as per routine.

## 2023-07-21 NOTE — CONSULT NOTE ADULT - PROBLEM SELECTOR RECOMMENDATION 2
episode of dyspnea/hypoxia overnight.  per patient, she has been having ongoing SCOTT for last few months. planned for NST as outpatient with her Cardiologist  - CXR personally reviewed - with significant b/l pulmonary edema compared to prior CXR in PACS  - pro-BNP added on to morning labs, elevated to 2K with crackles on exam  - lasix 20mg IVP x1 today (ordered)  - f/u TTE - performed  - f/u duplex of LE

## 2023-07-21 NOTE — PROGRESS NOTE ADULT - ASSESSMENT
Patient is 68 yo female, PMH HTN, HLD, depression, pre-diabetic, fatty liver, morbid obesity s/p lap band 2013, lost weight and gained it back - recent visit to bariatric GI - unable to deflate due to resistance on accessing port (note in chart) Pt. reports that in 2011 she fainted while in [employer], taken to [facility] and CT scan revealed a cerebral tumor, which was later diagnosed as a meningioma - she has been under observation and it has grown in size. Pt. presents to PST for scheduled Right Craniotomy for Tumor on 7/18/23. Pt. reports SCOTT which is unchanged, does not have an active lifestyle Denies dizziness, vision changes, headaches, recent fever, chills, chest pain, SOB, changes in bowel/urinary habits or recent exposure to COVID-19 infection. Pt. denies clotting or bleeding disorders.      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 Loaded with keppra.   7/21 vEEG no seizures; CXR: pulmonary edema, wheezing/SOB overnight; Hospitalist medicine following, lasix given; BNP 2425    NEURO:  vEEG negative, appreciate neurology follow up  cont keppra 1000mg BID   cont taper decadron, 3mg Q6h routine post op  tylenol and oxycodone PRN   Activity: [] OOB as tolerated [] Bedrest [X] PT [X] OT [x] PMR- acute rehab however refusing and wants to go home, home PT/OT being arranged once medically cleared    PULM:  Incentive spirometry, mobilize as tolerated  +pulmonary edema on CXR overnight, +lasix given  f/u am BMP    CV:  afib, rate controlled, on metoprolol  cont lisinopril for HTN  ECHO done, awaiting final read    RENAL:  IVL  voiding    GI:  Diet: advance diet as tolerated   senna and miralax for bowel regimen   GI prophylaxis [X] not indicated [] PPI [] other:    ENDO:   Goal euglycemia (-180)    HEME/ONC:  VTE prophylaxis: [X] SCDs [x] chemoprophylaxis- SQL [] hold chemoprophylaxis due to: [] high risk of DVT/PE on admission due to:    appreciate hospitalist medicine input  f/u am labs    DISPO: acute rehab however refusing, so home PT/OT arranged, Rolling walker RX given to CM    will discuss with Dr Gonzales  [id number]

## 2023-07-22 ENCOUNTER — TRANSCRIPTION ENCOUNTER (OUTPATIENT)
Age: 68
End: 2023-07-22

## 2023-07-22 VITALS
OXYGEN SATURATION: 98 % | RESPIRATION RATE: 18 BRPM | HEART RATE: 69 BPM | SYSTOLIC BLOOD PRESSURE: 129 MMHG | DIASTOLIC BLOOD PRESSURE: 79 MMHG | TEMPERATURE: 98 F

## 2023-07-22 PROBLEM — Z98.890 OTHER SPECIFIED POSTPROCEDURAL STATES: Chronic | Status: ACTIVE | Noted: 2023-06-27

## 2023-07-22 PROBLEM — D32.9 BENIGN NEOPLASM OF MENINGES, UNSPECIFIED: Chronic | Status: ACTIVE | Noted: 2023-06-27

## 2023-07-22 LAB
ANION GAP SERPL CALC-SCNC: 11 MMOL/L — SIGNIFICANT CHANGE UP (ref 5–17)
BASOPHILS # BLD AUTO: 0.02 K/UL — SIGNIFICANT CHANGE UP (ref 0–0.2)
BASOPHILS NFR BLD AUTO: 0.4 % — SIGNIFICANT CHANGE UP (ref 0–2)
BUN SERPL-MCNC: 20 MG/DL — SIGNIFICANT CHANGE UP (ref 7–23)
CALCIUM SERPL-MCNC: 8.8 MG/DL — SIGNIFICANT CHANGE UP (ref 8.4–10.5)
CHLORIDE SERPL-SCNC: 100 MMOL/L — SIGNIFICANT CHANGE UP (ref 96–108)
CO2 SERPL-SCNC: 26 MMOL/L — SIGNIFICANT CHANGE UP (ref 22–31)
CREAT SERPL-MCNC: 0.64 MG/DL — SIGNIFICANT CHANGE UP (ref 0.5–1.3)
EGFR: 98 ML/MIN/1.73M2 — SIGNIFICANT CHANGE UP
EOSINOPHIL # BLD AUTO: 0.01 K/UL — SIGNIFICANT CHANGE UP (ref 0–0.5)
EOSINOPHIL NFR BLD AUTO: 0.2 % — SIGNIFICANT CHANGE UP (ref 0–6)
GLUCOSE BLDC GLUCOMTR-MCNC: 127 MG/DL — HIGH (ref 70–99)
GLUCOSE BLDC GLUCOMTR-MCNC: 144 MG/DL — HIGH (ref 70–99)
GLUCOSE SERPL-MCNC: 145 MG/DL — HIGH (ref 70–99)
HCT VFR BLD CALC: 31.5 % — LOW (ref 34.5–45)
HGB BLD-MCNC: 10.1 G/DL — LOW (ref 11.5–15.5)
IMM GRANULOCYTES NFR BLD AUTO: 2.1 % — HIGH (ref 0–0.9)
LYMPHOCYTES # BLD AUTO: 1.03 K/UL — SIGNIFICANT CHANGE UP (ref 1–3.3)
LYMPHOCYTES # BLD AUTO: 19.3 % — SIGNIFICANT CHANGE UP (ref 13–44)
MAGNESIUM SERPL-MCNC: 2 MG/DL — SIGNIFICANT CHANGE UP (ref 1.6–2.6)
MCHC RBC-ENTMCNC: 29.4 PG — SIGNIFICANT CHANGE UP (ref 27–34)
MCHC RBC-ENTMCNC: 32.1 GM/DL — SIGNIFICANT CHANGE UP (ref 32–36)
MCV RBC AUTO: 91.6 FL — SIGNIFICANT CHANGE UP (ref 80–100)
MONOCYTES # BLD AUTO: 0.35 K/UL — SIGNIFICANT CHANGE UP (ref 0–0.9)
MONOCYTES NFR BLD AUTO: 6.5 % — SIGNIFICANT CHANGE UP (ref 2–14)
NEUTROPHILS # BLD AUTO: 3.83 K/UL — SIGNIFICANT CHANGE UP (ref 1.8–7.4)
NEUTROPHILS NFR BLD AUTO: 71.5 % — SIGNIFICANT CHANGE UP (ref 43–77)
NRBC # BLD: 0 /100 WBCS — SIGNIFICANT CHANGE UP (ref 0–0)
NT-PROBNP SERPL-SCNC: 2446 PG/ML — HIGH (ref 0–300)
PLATELET # BLD AUTO: 147 K/UL — LOW (ref 150–400)
POTASSIUM SERPL-MCNC: 3.9 MMOL/L — SIGNIFICANT CHANGE UP (ref 3.5–5.3)
POTASSIUM SERPL-SCNC: 3.9 MMOL/L — SIGNIFICANT CHANGE UP (ref 3.5–5.3)
RBC # BLD: 3.44 M/UL — LOW (ref 3.8–5.2)
RBC # FLD: 13.4 % — SIGNIFICANT CHANGE UP (ref 10.3–14.5)
SODIUM SERPL-SCNC: 137 MMOL/L — SIGNIFICANT CHANGE UP (ref 135–145)
WBC # BLD: 5.35 K/UL — SIGNIFICANT CHANGE UP (ref 3.8–10.5)
WBC # FLD AUTO: 5.35 K/UL — SIGNIFICANT CHANGE UP (ref 3.8–10.5)

## 2023-07-22 PROCEDURE — 84295 ASSAY OF SERUM SODIUM: CPT

## 2023-07-22 PROCEDURE — 94660 CPAP INITIATION&MGMT: CPT

## 2023-07-22 PROCEDURE — 85025 COMPLETE CBC W/AUTO DIFF WBC: CPT

## 2023-07-22 PROCEDURE — 97161 PT EVAL LOW COMPLEX 20 MIN: CPT

## 2023-07-22 PROCEDURE — 83605 ASSAY OF LACTIC ACID: CPT

## 2023-07-22 PROCEDURE — 93005 ELECTROCARDIOGRAM TRACING: CPT

## 2023-07-22 PROCEDURE — C1713: CPT

## 2023-07-22 PROCEDURE — 82330 ASSAY OF CALCIUM: CPT

## 2023-07-22 PROCEDURE — 82435 ASSAY OF BLOOD CHLORIDE: CPT

## 2023-07-22 PROCEDURE — 71045 X-RAY EXAM CHEST 1 VIEW: CPT

## 2023-07-22 PROCEDURE — 95714 VEEG EA 12-26 HR UNMNTR: CPT

## 2023-07-22 PROCEDURE — 83880 ASSAY OF NATRIURETIC PEPTIDE: CPT

## 2023-07-22 PROCEDURE — 36415 COLL VENOUS BLD VENIPUNCTURE: CPT

## 2023-07-22 PROCEDURE — 82947 ASSAY GLUCOSE BLOOD QUANT: CPT

## 2023-07-22 PROCEDURE — 82962 GLUCOSE BLOOD TEST: CPT

## 2023-07-22 PROCEDURE — 70450 CT HEAD/BRAIN W/O DYE: CPT

## 2023-07-22 PROCEDURE — 80048 BASIC METABOLIC PNL TOTAL CA: CPT

## 2023-07-22 PROCEDURE — 85014 HEMATOCRIT: CPT

## 2023-07-22 PROCEDURE — 99232 SBSQ HOSP IP/OBS MODERATE 35: CPT

## 2023-07-22 PROCEDURE — 80053 COMPREHEN METABOLIC PANEL: CPT

## 2023-07-22 PROCEDURE — 97116 GAIT TRAINING THERAPY: CPT

## 2023-07-22 PROCEDURE — C8929: CPT

## 2023-07-22 PROCEDURE — 94640 AIRWAY INHALATION TREATMENT: CPT

## 2023-07-22 PROCEDURE — 88342 IMHCHEM/IMCYTCHM 1ST ANTB: CPT

## 2023-07-22 PROCEDURE — 84443 ASSAY THYROID STIM HORMONE: CPT

## 2023-07-22 PROCEDURE — 97166 OT EVAL MOD COMPLEX 45 MIN: CPT

## 2023-07-22 PROCEDURE — 85018 HEMOGLOBIN: CPT

## 2023-07-22 PROCEDURE — 88360 TUMOR IMMUNOHISTOCHEM/MANUAL: CPT

## 2023-07-22 PROCEDURE — 84484 ASSAY OF TROPONIN QUANT: CPT

## 2023-07-22 PROCEDURE — 88307 TISSUE EXAM BY PATHOLOGIST: CPT

## 2023-07-22 PROCEDURE — 0225U NFCT DS DNA&RNA 21 SARSCOV2: CPT

## 2023-07-22 PROCEDURE — 83735 ASSAY OF MAGNESIUM: CPT

## 2023-07-22 PROCEDURE — 82565 ASSAY OF CREATININE: CPT

## 2023-07-22 PROCEDURE — 82803 BLOOD GASES ANY COMBINATION: CPT

## 2023-07-22 PROCEDURE — 84100 ASSAY OF PHOSPHORUS: CPT

## 2023-07-22 PROCEDURE — 85520 HEPARIN ASSAY: CPT

## 2023-07-22 PROCEDURE — C1769: CPT

## 2023-07-22 PROCEDURE — C1889: CPT

## 2023-07-22 PROCEDURE — 93970 EXTREMITY STUDY: CPT

## 2023-07-22 PROCEDURE — 84132 ASSAY OF SERUM POTASSIUM: CPT

## 2023-07-22 PROCEDURE — 85027 COMPLETE CBC AUTOMATED: CPT

## 2023-07-22 PROCEDURE — 97530 THERAPEUTIC ACTIVITIES: CPT

## 2023-07-22 PROCEDURE — 95700 EEG CONT REC W/VID EEG TECH: CPT

## 2023-07-22 RX ORDER — LEVETIRACETAM 250 MG/1
1 TABLET, FILM COATED ORAL
Qty: 60 | Refills: 0
Start: 2023-07-22 | End: 2023-08-20

## 2023-07-22 RX ORDER — DEXAMETHASONE 0.5 MG/5ML
3 ELIXIR ORAL
Qty: 36 | Refills: 0
Start: 2023-07-22 | End: 2023-07-27

## 2023-07-22 RX ADMIN — OXYCODONE HYDROCHLORIDE 10 MILLIGRAM(S): 5 TABLET ORAL at 06:03

## 2023-07-22 RX ADMIN — ENOXAPARIN SODIUM 30 MILLIGRAM(S): 100 INJECTION SUBCUTANEOUS at 09:12

## 2023-07-22 RX ADMIN — Medication 3 MILLIGRAM(S): at 12:58

## 2023-07-22 RX ADMIN — LISINOPRIL 10 MILLIGRAM(S): 2.5 TABLET ORAL at 05:10

## 2023-07-22 RX ADMIN — Medication 25 MILLIGRAM(S): at 13:05

## 2023-07-22 RX ADMIN — LEVETIRACETAM 400 MILLIGRAM(S): 250 TABLET, FILM COATED ORAL at 09:11

## 2023-07-22 RX ADMIN — Medication 50 MILLIGRAM(S): at 05:10

## 2023-07-22 RX ADMIN — PANTOPRAZOLE SODIUM 40 MILLIGRAM(S): 20 TABLET, DELAYED RELEASE ORAL at 12:57

## 2023-07-22 RX ADMIN — Medication 3 MILLIGRAM(S): at 05:10

## 2023-07-22 NOTE — DISCHARGE NOTE PROVIDER - HOSPITAL COURSE
68 y/o F with PMHx significant for HTN, HLD, depression, pre-diabetes, fatty liver, morbid obesity s/p lap band 2013 who presented 7/17/2023 for headaches and giddiness at [employer]. On MRI, found to have progressively enlarging R parietal convexity meningioma, now s/p R craniotomy with resection with neurosurgery 7/18. Today is POD1 meningioma resection. Neurology consulted for intraop seizure s/p loading with keppra 1g.  7/21: Patient reports feeling well, denies any more episode of LOC. No focal neurological deficit on exam.  7/22: Xferred to floor from icu, doing well, no HA, no SOB, cleared by hospitalist for discharge. No PT needs

## 2023-07-22 NOTE — PROGRESS NOTE ADULT - PROBLEM SELECTOR PLAN 3
newly diagnosed this month. not on a/c as was scheduled for tumor resection this month  - a/c when clear from neurosurgery standpoint  - resumed home metoprolol XL 50mg daily for rate control - continue on d/c  - outpatient f/u with her Cardiologist

## 2023-07-22 NOTE — DISCHARGE NOTE PROVIDER - NSDCFUSCHEDAPPT_GEN_ALL_CORE_FT
Jennifer Espinoza  [facility]  [phone]  Scheduled Appointment: 08/18/2023    [facility]  [facility] Surg End[facility]  Scheduled Appointment: 08/18/2023

## 2023-07-22 NOTE — DISCHARGE NOTE PROVIDER - NSDCFUADDINST_GEN_ALL_CORE_FT
Patient underwent craniotomy for resection of tumor. Ok to shower starting tomorrow. Prescriptions for Keppra (antiepileptic) and steroid 6-day taper were sent to [facility] pharmacy. Can follow up with Dr. Gonzales within 2 weeks, please call the office to confirm an appointment on monday. No activity or diet restrictions.   Patient must make an appointment with her cardiologist to follow up within 2 weeks of discharge.

## 2023-07-22 NOTE — DISCHARGE NOTE NURSING/CASE MANAGEMENT/SOCIAL WORK - PATIENT PORTAL LINK FT
You can access the FollowMyHealth Patient Portal offered by [facility] by registering at the following website: [email or web address]. By joining [employer]’s FollowMyHealth portal, you will also be able to view your health information using other applications (apps) compatible with our system.

## 2023-07-22 NOTE — DISCHARGE NOTE PROVIDER - NSDCCPTREATMENT_GEN_ALL_CORE_FT
PRINCIPAL PROCEDURE  Procedure: Craniotomy, with stereotactic guidance, for tumor  Findings and Treatment:

## 2023-07-22 NOTE — PROGRESS NOTE ADULT - PROBLEM SELECTOR PLAN 2
episode of dyspnea/hypoxia overnight.  per patient, she has been having ongoing SCOTT for last few months. planned for NST as outpatient with her Cardiologist  - CXR personally reviewed - with significant b/l pulmonary edema compared to prior CXR in PACS  - pro-BNP added on to morning labs, elevated to 2K with crackles on exam  - had duplex of LE 7/19 negative for DVT  - lasix 20mg IVP x1 on 7/21 with resolution of dyspnea  - TTE with normal LVSF EF 60%, no significant valvular disease  - no need for further diuretics on d/c  - patient to f/u with her Cardiologist as outpatient

## 2023-07-22 NOTE — DISCHARGE NOTE PROVIDER - CARE PROVIDER_API CALL
Filemon Gonzales  [address] [address]  Phone: [phone]  Fax: [phone]  Follow Up Time:

## 2023-07-22 NOTE — PROGRESS NOTE ADULT - NSPROGADDITIONALINFOA_GEN_ALL_CORE
.  Beth Mascorro MD  Division of Hospital Medicine  [facility]   Spectra: [phone]    Medically clear from medicine standpoint for DC today.   Patient to f/u with her outpatient Cardiologist which she and  are aware of.    Plan discussed with patient,  bedside, and neurosurgery resident Latoya.

## 2023-07-22 NOTE — DISCHARGE NOTE PROVIDER - NSDCMRMEDTOKEN_GEN_ALL_CORE_FT
Altace 2.5 mg oral tablet: 1 tab(s) orally once a day  Crestor 20 mg oral tablet: 1 tab(s) orally once a day  dexAMETHasone 1 mg oral tablet: 3 tab(s) orally 2 times a day Take 3 tabs twice a day for 2 days, then 2 tabs twice a day for 2 days, then 1 tab twice a day for 2 days, then off.  levETIRAcetam 1000 mg oral tablet: 1 tab(s) orally 2 times a day  Prashanth Walker: s/p crani for tumor  venlafaxine 225 mg oral tablet, extended release: 1 tab(s) orally once a day

## 2023-07-22 NOTE — DISCHARGE NOTE NURSING/CASE MANAGEMENT/SOCIAL WORK - FLU SEASON?
No Regarding:  In need of the a prior authorization or get a different alternative  ----- Message from Gabi Mosqueda sent at 6/16/2020  5:29 PM CDT -----  Patient Name: Anna Jesus    Specialist or PCP Full Name:Dr. Refugio Yousif    Pregnant (If Yes, how long?):N/A    Symptoms: In need of the a prior authorization or get a different alternative    Do you or any of your household members have the following symptoms:  Fever >100.4#F or >38.0#C: N/A    New or worsening cough, shortness of breath, or sore throat: N/A    New onset of nausea, vomiting or diarrhea: N/A    New onset of loss of taste or smell, chills, repeated shaking with chills, muscle pain, or headache: N/A    Have you, a household member, or another person you have been in contact with tested positive for COVID-19 in the last 14 days?: N/A    Call Back #:[phone]    Call Center Account #: [id number]    Please update the Demographics section with the patients permanent resident address     Emergent COVID-19 Symptoms requiring Nurse Triage:  Trouble breathing, Persistent pain or pressure in the chest, New confusion, Inability to wake or stay awake, Bluish lips or face

## 2023-07-22 NOTE — DISCHARGE NOTE NURSING/CASE MANAGEMENT/SOCIAL WORK - NSDCPEFALRISK_GEN_ALL_CORE
For information on Fall & Injury Prevention, visit: [email or web address] OR  [email or web address] OR  https://www.cdc.gov/steadi/patient.html

## 2023-07-22 NOTE — PROGRESS NOTE ADULT - SUBJECTIVE AND OBJECTIVE BOX
Beth Mascorro MD  Division of Hospital Medicine  [facility]  Spectra: [phone]      Patient is a 67y old  Female who presents with a chief complaint of OR for meningioma resection (22 Jul 2023 11:40)      SUBJECTIVE / OVERNIGHT EVENTS: no acute events overnight. dyspnea resolved with no further episodes. no fever, chills, chest pain, abd, n/v.   ADDITIONAL REVIEW OF SYSTEMS:    MEDICATIONS  (STANDING):  atorvastatin 80 milliGRAM(s) Oral at bedtime  bisacodyl 5 milliGRAM(s) Oral at bedtime  chlorhexidine 2% Cloths 1 Application(s) Topical once  dexAMETHasone     Tablet 3 milliGRAM(s) Oral every 6 hours  dextrose 5%. 1000 milliLiter(s) (50 mL/Hr) IV Continuous <Continuous>  dextrose 5%. 1000 milliLiter(s) (100 mL/Hr) IV Continuous <Continuous>  dextrose 50% Injectable 12.5 Gram(s) IV Push once  dextrose 50% Injectable 25 Gram(s) IV Push once  dextrose 50% Injectable 25 Gram(s) IV Push once  enoxaparin Injectable 30 milliGRAM(s) SubCutaneous <User Schedule>  glucagon  Injectable 1 milliGRAM(s) IntraMuscular once  insulin lispro (ADMELOG) corrective regimen sliding scale   SubCutaneous three times a day before meals  insulin lispro (ADMELOG) corrective regimen sliding scale   SubCutaneous at bedtime  levETIRAcetam  IVPB 1000 milliGRAM(s) IV Intermittent every 12 hours  lisinopril 10 milliGRAM(s) Oral daily  metoprolol succinate ER 50 milliGRAM(s) Oral daily  pantoprazole  Injectable 40 milliGRAM(s) IV Push daily  polyethylene glycol 3350 17 Gram(s) Oral two times a day  senna 2 Tablet(s) Oral at bedtime  venlafaxine 25 milliGRAM(s) Oral daily    MEDICATIONS  (PRN):  acetaminophen     Tablet .. 650 milliGRAM(s) Oral every 6 hours PRN Temp greater or equal to 38C (100.4F), Mild Pain (1 - 3)  dextrose Oral Gel 15 Gram(s) Oral once PRN Blood Glucose LESS THAN 70 milliGRAM(s)/deciliter  ondansetron Injectable 4 milliGRAM(s) IV Push every 6 hours PRN Nausea and/or Vomiting  oxyCODONE    IR 5 milliGRAM(s) Oral every 4 hours PRN Moderate Pain (4 - 6)  oxyCODONE    IR 10 milliGRAM(s) Oral every 4 hours PRN Severe Pain (7 - 10)      CAPILLARY BLOOD GLUCOSE      POCT Blood Glucose.: 144 mg/dL (22 Jul 2023 11:37)  POCT Blood Glucose.: 127 mg/dL (22 Jul 2023 07:43)  POCT Blood Glucose.: 117 mg/dL (21 Jul 2023 21:25)  POCT Blood Glucose.: 176 mg/dL (21 Jul 2023 16:28)    I&O's Summary    21 Jul 2023 07:01  -  22 Jul 2023 07:00  --------------------------------------------------------  IN: 820 mL / OUT: 0 mL / NET: 820 mL        PHYSICAL EXAM:  Vital Signs Last 24 Hrs  T(C): 36.7 (22 Jul 2023 12:54), Max: 37 (21 Jul 2023 14:18)  T(F): 98.1 (22 Jul 2023 12:54), Max: 98.6 (21 Jul 2023 14:18)  HR: 69 (22 Jul 2023 12:54) (67 - 88)  BP: 129/79 (22 Jul 2023 12:54) (112/76 - 142/87)  BP(mean): --  RR: 18 (22 Jul 2023 12:54) (17 - 18)  SpO2: 98% (22 Jul 2023 12:54) (96% - 100%)    Parameters below as of 22 Jul 2023 12:54  Patient On (Oxygen Delivery Method): room air    CONSTITUTIONAL: NAD, well-developed, well-groome  EYES: PERRLA; conjunctiva and sclera clear  ENMT: Moist oral mucosa, no pharyngeal injection or exudates; normal dentition  NECK: Supple, no palpable masses; no thyromegaly  RESPIRATORY: Normal respiratory effort; lungs clear to auscultation b/l  CARDIOVASCULAR: Regular rate and rhythm, normal S1 and S2, no murmur/rub/gallop; No lower extremity edema; Peripheral pulses are 2+ bilaterally  ABDOMEN: Soft, Nondistended, Nontender to palpation, normoactive bowel sounds  MUSCULOSKELETAL:  No clubbing or cyanosis of digits; no joint swelling or tenderness to palpation  PSYCH: A+O to person, place, and time; affect appropriate  NEUROLOGY: CN 2-12 are intact and symmetric; no gross sensory deficits   SKIN: No rashes; no palpable lesions    LABS:                        10.1   5.35  )-----------( 147      ( 22 Jul 2023 06:14 )             31.5     07-22    137  |  100  |  20  ----------------------------<  145<H>  3.9   |  26  |  0.64    Ca    8.8      22 Jul 2023 06:12  Phos  3.4     07-21  Mg     2.0     07-22    TPro  6.1  /  Alb  3.8  /  TBili  0.3  /  DBili  x   /  AST  53<H>  /  ALT  99<H>  /  AlkPhos  52  07-21        Urinalysis Basic - ( 22 Jul 2023 06:12 )    Color: x / Appearance: x / SG: x / pH: x  Gluc: 145 mg/dL / Ketone: x  / Bili: x / Urobili: x   Blood: x / Protein: x / Nitrite: x   Leuk Esterase: x / RBC: x / WBC x   Sq Epi: x / Non Sq Epi: x / Bacteria: x          RADIOLOGY & ADDITIONAL TESTS:  Results Reviewed: electrolytes at goal  Imaging Personally Reviewed:  7/21/23 TTE:  CONCLUSIONS:      1. The left ventricular systolic function is normal with an ejection fraction of 60 % by Bennett's method of disks.   2. The left ventricular diastolic function is indeterminate.   3. Mildly enlarged right ventricular cavity size and normal right ventricular systolic function.   4. No significant valvular disease.   5. No prior echocardiogram is available for comparison.   6. Technically difficult image quality.    Electrocardiogram Personally Reviewed:    COORDINATION OF CARE:  Care Discussed with Consultants/Other Providers [Y]: neurosurgery resident Latoya  Prior or Outpatient Records Reviewed [Y/N]:

## 2023-07-22 NOTE — PROGRESS NOTE ADULT - ASSESSMENT
66 yo female, PMH HTN, HLD, new diagnosis of afib not on a/c (as planned for surgery) depression, pre-diabetes, fatty liver, morbid obesity s/p lap band 2013, lost weight and gained it back - recent visit to bariatric GI - unable to deflate due to resistance on accessing port (note in chart) Pt. reports that in 2011 she fainted while in [employer], taken to [facility] and CT scan revealed a cerebral tumor, which was later diagnosed as a meningioma - she has been under observation and it has grown in size. Patient presented for scheduled R craniotomy for tumor resection on 7/18/23. Monitored in NSCU post-op and transferred to NSCU to floors on 7/20. Course c/b transient dyspnea and hypoxia overnight for which medicine was consulted.

## 2023-07-23 RX ORDER — OXYCODONE AND ACETAMINOPHEN 5; 325 MG/1; MG/1
5-325 TABLET ORAL EVERY 6 HOURS
Qty: 12 | Refills: 0 | Status: COMPLETED | COMMUNITY
Start: 2023-07-23

## 2023-07-24 LAB — SURGICAL PATHOLOGY STUDY: SIGNIFICANT CHANGE UP

## 2023-07-24 RX ORDER — OXYCODONE HYDROCHLORIDE 5 MG/1
1 TABLET ORAL
Qty: 20 | Refills: 0
Start: 2023-07-24 | End: 2023-07-28

## 2023-07-25 ENCOUNTER — NON-APPOINTMENT (OUTPATIENT)
Age: 68
End: 2023-07-25

## 2023-08-02 ENCOUNTER — APPOINTMENT (OUTPATIENT)
Dept: NEUROSURGERY | Facility: CLINIC | Age: 68
End: 2023-08-02
Payer: MEDICARE

## 2023-08-02 VITALS
WEIGHT: 220 LBS | RESPIRATION RATE: 16 BRPM | DIASTOLIC BLOOD PRESSURE: 76 MMHG | BODY MASS INDEX: 37.56 KG/M2 | SYSTOLIC BLOOD PRESSURE: 127 MMHG | HEART RATE: 81 BPM | TEMPERATURE: 98 F | OXYGEN SATURATION: 99 % | HEIGHT: 64 IN

## 2023-08-02 PROCEDURE — 99024 POSTOP FOLLOW-UP VISIT: CPT

## 2023-08-02 NOTE — ASSESSMENT
[FreeTextEntry1] : Patient doing well post operative Occasional numbness in left thigh from sitting on the pot. usually resolves spontaneously ADv: MRI w cont after 2 weeks from today. (08/16/2023) Follow up after 2 weeks from today (08/16/2023) Keppra 500 mg BID x 2 weeks, then stop

## 2023-08-14 PROBLEM — R73.03 PREDIABETES: Chronic | Status: ACTIVE | Noted: 2023-06-27

## 2023-08-15 ENCOUNTER — TRANSCRIPTION ENCOUNTER (OUTPATIENT)
Age: 68
End: 2023-08-15

## 2023-08-18 ENCOUNTER — APPOINTMENT (OUTPATIENT)
Dept: GASTROENTEROLOGY | Facility: HOSPITAL | Age: 68
End: 2023-08-18

## 2023-08-23 ENCOUNTER — OUTPATIENT (OUTPATIENT)
Dept: OUTPATIENT SERVICES | Facility: HOSPITAL | Age: 68
LOS: 1 days | End: 2023-08-23
Payer: MEDICARE

## 2023-08-23 ENCOUNTER — APPOINTMENT (OUTPATIENT)
Dept: MRI IMAGING | Facility: IMAGING CENTER | Age: 68
End: 2023-08-23
Payer: MEDICARE

## 2023-08-23 DIAGNOSIS — Z98.84 BARIATRIC SURGERY STATUS: Chronic | ICD-10-CM

## 2023-08-23 DIAGNOSIS — D32.9 BENIGN NEOPLASM OF MENINGES, UNSPECIFIED: ICD-10-CM

## 2023-08-23 DIAGNOSIS — Z98.890 OTHER SPECIFIED POSTPROCEDURAL STATES: Chronic | ICD-10-CM

## 2023-08-23 PROCEDURE — 70553 MRI BRAIN STEM W/O & W/DYE: CPT

## 2023-08-23 PROCEDURE — 70553 MRI BRAIN STEM W/O & W/DYE: CPT | Mod: 26,MH

## 2023-08-31 ENCOUNTER — TRANSCRIPTION ENCOUNTER (OUTPATIENT)
Age: 68
End: 2023-08-31

## 2023-08-31 ENCOUNTER — APPOINTMENT (OUTPATIENT)
Dept: NEUROSURGERY | Facility: CLINIC | Age: 68
End: 2023-08-31
Payer: MEDICARE

## 2023-08-31 PROCEDURE — 99024 POSTOP FOLLOW-UP VISIT: CPT

## 2023-10-26 ENCOUNTER — TRANSCRIPTION ENCOUNTER (OUTPATIENT)
Age: 68
End: 2023-10-26

## 2023-11-21 ENCOUNTER — APPOINTMENT (OUTPATIENT)
Dept: GASTROENTEROLOGY | Facility: HOSPITAL | Age: 68
End: 2023-11-21

## 2023-11-22 ENCOUNTER — RX RENEWAL (OUTPATIENT)
Age: 68
End: 2023-11-22

## 2023-11-27 ENCOUNTER — RX RENEWAL (OUTPATIENT)
Age: 68
End: 2023-11-27

## 2023-11-27 ENCOUNTER — TRANSCRIPTION ENCOUNTER (OUTPATIENT)
Age: 68
End: 2023-11-27

## 2024-01-17 ENCOUNTER — TRANSCRIPTION ENCOUNTER (OUTPATIENT)
Age: 69
End: 2024-01-17

## 2024-01-29 NOTE — ASU PATIENT PROFILE, ADULT - NSICDXPASTSURGICALHX_GEN_ALL_CORE_FT
PAST SURGICAL HISTORY:  H/O laparoscopic adjustable gastric banding     S/P cholecystectomy 2011    S/P knee surgery right knee meniscus repair 2010    S/P laminectomy 2000    Uterine disorder ablation 2005

## 2024-01-29 NOTE — ASU PATIENT PROFILE, ADULT - NSICDXPASTMEDICALHX_GEN_ALL_CORE_FT
PAST MEDICAL HISTORY:  Depression     H/O colonoscopy     Hemangioma     Hypertension     Meningioma     Pre-diabetes     Sleep apnea uses CPAP machine    Tinea versicolor

## 2024-01-30 ENCOUNTER — TRANSCRIPTION ENCOUNTER (OUTPATIENT)
Age: 69
End: 2024-01-30

## 2024-01-30 ENCOUNTER — APPOINTMENT (OUTPATIENT)
Dept: GASTROENTEROLOGY | Facility: HOSPITAL | Age: 69
End: 2024-01-30
Payer: MEDICARE

## 2024-01-30 ENCOUNTER — RESULT REVIEW (OUTPATIENT)
Age: 69
End: 2024-01-30

## 2024-01-30 ENCOUNTER — OUTPATIENT (OUTPATIENT)
Dept: OUTPATIENT SERVICES | Facility: HOSPITAL | Age: 69
LOS: 1 days | Discharge: HOME | End: 2024-01-30
Payer: MEDICARE

## 2024-01-30 VITALS
OXYGEN SATURATION: 96 % | RESPIRATION RATE: 17 BRPM | TEMPERATURE: 98 F | SYSTOLIC BLOOD PRESSURE: 118 MMHG | DIASTOLIC BLOOD PRESSURE: 91 MMHG | WEIGHT: 229.94 LBS | HEIGHT: 64 IN | HEART RATE: 95 BPM

## 2024-01-30 VITALS
RESPIRATION RATE: 19 BRPM | OXYGEN SATURATION: 99 % | SYSTOLIC BLOOD PRESSURE: 133 MMHG | DIASTOLIC BLOOD PRESSURE: 78 MMHG | HEART RATE: 81 BPM

## 2024-01-30 DIAGNOSIS — Z98.84 BARIATRIC SURGERY STATUS: Chronic | ICD-10-CM

## 2024-01-30 DIAGNOSIS — K21.9 GASTRO-ESOPHAGEAL REFLUX DISEASE WITHOUT ESOPHAGITIS: ICD-10-CM

## 2024-01-30 DIAGNOSIS — Z98.890 OTHER SPECIFIED POSTPROCEDURAL STATES: Chronic | ICD-10-CM

## 2024-01-30 PROCEDURE — 43239 EGD BIOPSY SINGLE/MULTIPLE: CPT

## 2024-01-30 PROCEDURE — 88305 TISSUE EXAM BY PATHOLOGIST: CPT | Mod: 26

## 2024-01-30 DEVICE — IMPLANTABLE DEVICE: Type: IMPLANTABLE DEVICE | Status: FUNCTIONAL

## 2024-01-30 RX ORDER — VENLAFAXINE HCL 75 MG
1 CAPSULE, EXT RELEASE 24 HR ORAL
Refills: 0 | DISCHARGE

## 2024-01-30 NOTE — ASU DISCHARGE PLAN (ADULT/PEDIATRIC) - ASU DC SPECIAL INSTRUCTIONSFT
Test over on : Saturday Feb 3rd at 1:00 PM    No antacids (TUMS, Maalox, MoM, PPI, H2B) x 96 hours  No MRI x 4 weeks  Return recorder to [facility] endoscopy on Monday Feb 5th   Endoscopy suite is open from 7 AM- 7 PM

## 2024-01-30 NOTE — PRE PROCEDURE NOTE - PRE PROCEDURE EVALUATION
Pre-Endoscopy Evaluation    Referring Physician:                                    Indication for Procedure:  GERD    Sedation by Anesthesia [X ]    PAST MEDICAL & SURGICAL HISTORY:  Sleep apnea  uses CPAP machine      Hypertension      Depression      Tinea versicolor      Hemangioma      H/O colonoscopy      Pre-diabetes      Meningioma      Uterine disorder  ablation 2005      S/P cholecystectomy  2011      S/P knee surgery  right knee meniscus repair 2010      S/P laminectomy  2000      H/O laparoscopic adjustable gastric banding          Latex allergy: [ ] yes [X] no    Smoking: [ ] yes  [X] no    AICD/PPM: [ ] yes   [X] no    Pertinent lab data:                      Physical Examination:  Daily     Daily   Vital Signs Last 24 Hrs  T(C): --  T(F): --  HR: --  BP: --  BP(mean): --  RR: --  SpO2: --        Constitutional: NAD    HEENT: PERRLA, EOMI,       Neck:  No JVD    Respiratory: CTAB/L    Cardiovascular: S1 and S2    Gastrointestinal: BS+, soft, NT/ND    Extremities: No peripheral edema    Neurological: A/O x 3, no focal deficits    Psychiatric: Normal mood, normal affect    : No Caal    Skin: No rashes    Comments:    ASA Class: I [ ]  II [X ]  III [ ]  IV [ ]    The patient is a suitable candidate for the planned procedure unless box checked [ ]  No, explain:

## 2024-01-30 NOTE — ASU DISCHARGE PLAN (ADULT/PEDIATRIC) - NS MD DC FALL RISK RISK
For information on Fall & Injury Prevention, visit: [email or web address] OR  [email or web address] OR  https://www.cdc.gov/steadi/patient.html

## 2024-02-02 PROCEDURE — 91035 G-ESOPH REFLX TST W/ELECTROD: CPT | Mod: 26

## 2024-02-05 LAB — SURGICAL PATHOLOGY STUDY: SIGNIFICANT CHANGE UP

## 2024-02-22 ENCOUNTER — APPOINTMENT (OUTPATIENT)
Dept: BARIATRICS | Facility: CLINIC | Age: 69
End: 2024-02-22
Payer: MEDICARE

## 2024-02-22 VITALS
SYSTOLIC BLOOD PRESSURE: 130 MMHG | DIASTOLIC BLOOD PRESSURE: 74 MMHG | HEIGHT: 64 IN | WEIGHT: 230 LBS | OXYGEN SATURATION: 99 % | TEMPERATURE: 96.7 F | BODY MASS INDEX: 39.27 KG/M2 | HEART RATE: 71 BPM

## 2024-02-22 DIAGNOSIS — R63.8 OTHER SYMPTOMS AND SIGNS CONCERNING FOOD AND FLUID INTAKE: ICD-10-CM

## 2024-02-22 DIAGNOSIS — Z01.818 ENCOUNTER FOR OTHER PREPROCEDURAL EXAMINATION: ICD-10-CM

## 2024-02-22 PROCEDURE — 99215 OFFICE O/P EST HI 40 MIN: CPT

## 2024-02-22 PROCEDURE — G0447 BEHAVIOR COUNSEL OBESITY 15M: CPT | Mod: 59

## 2024-02-22 RX ORDER — RIVAROXABAN 2.5 MG/1
2.5 TABLET, FILM COATED ORAL
Refills: 0 | Status: ACTIVE | COMMUNITY

## 2024-02-22 RX ORDER — ROSUVASTATIN CALCIUM 20 MG/1
20 TABLET, FILM COATED ORAL
Qty: 90 | Refills: 1 | Status: DISCONTINUED | COMMUNITY
Start: 2023-11-27 | End: 2024-02-22

## 2024-02-22 NOTE — PHYSICAL EXAM
[Normal] : affect appropriate [de-identified] : EOMI, no conjunctival injection, anicteric  [de-identified] : Well, healthy appearing adult  [de-identified] : Equal chest rise, non-labored respirations, no audible wheezing  [de-identified] : No visualized JVD or audible bruits  [de-identified] : soft, NT, ND, no diastasis or hernias appreciated, incisions well healed  port to LUQ nontender, no erythema or swelling [de-identified] : Regular rate, no audible carotid bruits or JVD appreciated  [de-identified] : BRIDGETTE

## 2024-02-22 NOTE — ASSESSMENT
[FreeTextEntry1] : 68 year old F is s/p LAGB APS (Geiss 2013) presents today to discuss band removal with conversion to sleeve gastrectomy. Pt reports weight gain creating depressive moods, with lack of interest in activities and poor dietary choices. Doing well overall.   Reviewed guidelines about nutrition and snacking - protein focus diet with 3 meals/day Encouraged better food choices - maintain food log Daily zero calorie liquid intake goal of 64 oz/day Encouraged to participate in a daily exercise regimen incorporating cardio and strength training Track steps - goal approximately 8-10k steps per day   Labs ordered to be done before next visit Today we have discussed the Risks, Benefits, and Alternatives to surgical intervention of conversion from lap band to Laparoscopic Sleeve Gastrectomy.  All questions have been answered.   Risk, Benefits, and Alternatives to surgery have been discussed.  This includes but is not limited to bleeding, infection, damage to adjacent structures, need for additional surgery or interventions, adverse effects of anesthesia such as cardio-respiratory complications, prolonged intubation, cardiac arrhythmia, arrest, and or death.  Risks of forgoing surgery have also been discussed including progression of, and/or worsening of current condition which may then require urgent or emergent treatment or surgery.  This process is quite extensive requiring the patient to undergo preoperative assessments including evaluation and documented weight history by patient's Physician, Evaluation and treatment by a Psychiatrist, Nutritionist, Cardiologist, Pulmonologist in addition to preoperative upper endoscopy. In addition, 3-6 consecutive months of medically managed preoperative weight loss counseling will also be required. Throughout this process I anticipate and would expect an EBL (Excess Body Weight Loss) between 10 and 15% prior to surgery.  Greater than 15 minutes spent with pt discussing importance of health maintenance principles including dietary and lifestyle changes as well as long-term weight maintenance. Nutritional counseling has been provided. The patient is encouraged to remain calorie conscious and continue a low fat, low carbohydrate, high protein diet. Also, emphasized has been placed on the importance of adequate hydration and multi-vitamin supplementation and exercise.  Pending preoperative workup and clearance is in accordance with NIH criteria, this patient will be scheduled electively for single stage conversion of band to Laparoscopic Sleeve Gastrectomy.  Preoperative protocols and referrals have been reviewed at length with the patient and all questions have been answered. Follow up in one to 2 months to reassess weight loss progression and facilitate any additional preoperative clearances.  Nutritional Counseling: Nutritional and weight loss counseling has been provided. The importance of weight reduction in the treatment of Obesity and its contribution to resolution of obesity related comorbidities has been discussed.  The patient is encouraged to remain calorie conscious and continue a low fat, low carbohydrate, high protein diet. Eat slowly and chew food well.  Avoid eating and drinking simultaneously.  Also, emphasis has been placed on the importance of adequate hydration, multi-vitamin supplementation and exercise.  Pt seen with Dr. Kowalski    Additional time spent before and after visit reviewing chart

## 2024-02-22 NOTE — HISTORY OF PRESENT ILLNESS
[Procedure: ___] : Procedure performed: [unfilled]  [Surgeon Name:   ___] : Surgeon Name: Dr. BUSCH [Date of Surgery: ___] : Date of Surgery:   [unfilled] [Pre-Op Weight ___] : Pre-op weight was [unfilled] lbs [de-identified] : 68 year old F is s/p LAGB APS (Geiss 2013) presents today to discuss band removal with conversion to sleeve gastrectomy. Pt reports weight gain creating depressive moods, with lack of interest in activities and poor dietary choices. Consuming 2 meals/day, skipping either lunch or dinner. Drinking a glass of wine daily with 28-32oz/day of water. Lack of interest in physical activities. Sleeping ~ 4-5 hours/night. Reports constipation, taking Dulcolax as needed. No abdominal pain, reflux, nausea, or vomiting.  Pt recently had EGD with Bravo pH study done - negative findings.

## 2024-02-22 NOTE — ADDENDUM
[FreeTextEntry1] : Patient seen in conjunction with surgical PA.  Findings as documented above. Of note this patient presents with significant weight regain with recent history of craniotomy for hemangioma visage has resulted in excessive least sedentary period of time with limited activity and poor attentiveness to her diet. Lap-Band has been become ineffective and is successfully controlling the patient's cravings and level of satiety and continues to cause reflux like symptoms. EGD otherwise unremarkable and Bravo studies showing no significant acid reflux, making her symptoms directly related to the Band itself. As such we have once again discussed Lap-Band removal with single-stage revision to sleeve gastrectomy.  Risk, Benefits, and Alternatives to surgery have been discussed.  This includes but is not limited to bleeding, infection, damage to adjacent structures, need for additional surgery or interventions, adverse effects of anesthesia such as cardio-respiratory complications, prolonged intubation, cardiac arrhythmia, arrest, and or death.  Risks of forgoing surgery have also been discussed including progression of, and/or worsening of current condition which may then require urgent or emergent treatment or surgery. And preoperative workup and processes in preparation for revisional surgery have been discussed and all questions have been answered. Nutritional and weight loss counseling has been provided. The importance of weight reduction in the treatment of Obesity and its contribution to resolution of obesity related comorbidities has been discussed.  The patient is encouraged to remain calorie conscious and continue a low fat, low carbohydrate, high protein diet. Eat slowly and chew food well.  Avoid eating and drinking simultaneously.  Also, emphasis has been placed on the importance of adequate hydration, multi-vitamin supplementation and exercise.  (15 min) In addition to routine presurgical evaluations and clearances I have asked the patient to obtain clearance from her neurologist and/or neurosurgeon in anticipation of undergoing general anesthesia. Follow-up in 2 months

## 2024-02-29 ENCOUNTER — TRANSCRIPTION ENCOUNTER (OUTPATIENT)
Age: 69
End: 2024-02-29

## 2024-03-14 ENCOUNTER — APPOINTMENT (OUTPATIENT)
Dept: PULMONOLOGY | Facility: CLINIC | Age: 69
End: 2024-03-14
Payer: MEDICARE

## 2024-03-14 VITALS
DIASTOLIC BLOOD PRESSURE: 100 MMHG | WEIGHT: 233 LBS | HEIGHT: 64 IN | HEART RATE: 104 BPM | OXYGEN SATURATION: 98 % | SYSTOLIC BLOOD PRESSURE: 143 MMHG | BODY MASS INDEX: 39.78 KG/M2 | RESPIRATION RATE: 20 BRPM

## 2024-03-14 PROCEDURE — 99204 OFFICE O/P NEW MOD 45 MIN: CPT | Mod: 25

## 2024-03-14 PROCEDURE — 71046 X-RAY EXAM CHEST 2 VIEWS: CPT

## 2024-03-14 PROCEDURE — 94726 PLETHYSMOGRAPHY LUNG VOLUMES: CPT

## 2024-03-14 PROCEDURE — 94729 DIFFUSING CAPACITY: CPT

## 2024-03-14 PROCEDURE — ZZZZZ: CPT

## 2024-03-14 PROCEDURE — 94010 BREATHING CAPACITY TEST: CPT

## 2024-03-14 NOTE — ASSESSMENT
[FreeTextEntry1] : reevaluate RUT with HST now pulmonary clearance for bariatric surgery pending review of sleep testing.  A total of 45 minutes was spent on this encounter including history taking, chart review, physical examination, testing interpretation and treatment plan.

## 2024-03-14 NOTE — CONSULT LETTER
[FreeTextEntry1] : Dear ,  I had the pleasure of evaluating your patient, MANDO MAYO today in pulmonary consultation.  Please refer to my attached note for my findings and recommendations.   Thank you for allowing me to participate in the care of your patient, please feel free to call with any questions or concerns.   Sincerely,  Oneida Seaman MD [facility]  [facility]

## 2024-03-14 NOTE — HISTORY OF PRESENT ILLNESS
[Never] : never [TextBox_4] : 68F with HTN, afib, meningioma s/p resection, obesity s/p lap band, emilia previoulsy on cpap here for pulmonary clearance prior to bariatric surgery.  Had been on cpap prior to lap band, lost weight then stopped.  Has since gained weight back.  Denies snoring/choking, feels well rested.  No other pulm hx, never smoker.  No issues with anesthesia in the past.  Denies sob/cough/wheeze.

## 2024-03-14 NOTE — PROCEDURE
[FreeTextEntry1] : CXR: clear lungs, no focal consolidation or pleural effusions, cardiac silhouette appears enlarged.  No bony abnormality.  PFT: Normal spirometry.  Lung volumes normal. DLCO normal.

## 2024-03-27 ENCOUNTER — TRANSCRIPTION ENCOUNTER (OUTPATIENT)
Age: 69
End: 2024-03-27

## 2024-04-01 ENCOUNTER — TRANSCRIPTION ENCOUNTER (OUTPATIENT)
Age: 69
End: 2024-04-01

## 2024-04-03 ENCOUNTER — TRANSCRIPTION ENCOUNTER (OUTPATIENT)
Age: 69
End: 2024-04-03

## 2024-04-10 ENCOUNTER — APPOINTMENT (OUTPATIENT)
Dept: NEUROSURGERY | Facility: CLINIC | Age: 69
End: 2024-04-10
Payer: MEDICARE

## 2024-04-10 ENCOUNTER — APPOINTMENT (OUTPATIENT)
Dept: MRI IMAGING | Facility: CLINIC | Age: 69
End: 2024-04-10
Payer: MEDICARE

## 2024-04-10 PROCEDURE — 70553 MRI BRAIN STEM W/O & W/DYE: CPT

## 2024-04-10 PROCEDURE — 99212 OFFICE O/P EST SF 10 MIN: CPT

## 2024-04-10 PROCEDURE — A9585: CPT

## 2024-04-11 ENCOUNTER — TRANSCRIPTION ENCOUNTER (OUTPATIENT)
Age: 69
End: 2024-04-11

## 2024-04-11 NOTE — ASSESSMENT
[FreeTextEntry1] : Patient seen at 8month follow up after resection of R parietal convexity meningioma (Grade 1 WHO) Dunham Gr 1 resection Reports mild headaches occasionally. Planning to get bariatric surgery procedure: SLeeve gastrectomy (lap gastric band slipped out after 11 yrs)  MRI reviewed: No residual/ recurrence. Contralateral L Parasagittal small meningioma same in size. Adv Cleared for bariatric procedure from Neurosurgical perspective Repeat MR Brain (non-contrast) after 6months and review

## 2024-04-15 ENCOUNTER — APPOINTMENT (OUTPATIENT)
Dept: BARIATRICS/WEIGHT MGMT | Facility: CLINIC | Age: 69
End: 2024-04-15
Payer: COMMERCIAL

## 2024-04-15 VITALS — WEIGHT: 233 LBS | HEIGHT: 64 IN | BODY MASS INDEX: 39.78 KG/M2

## 2024-04-15 DIAGNOSIS — E66.9 OBESITY, UNSPECIFIED: ICD-10-CM

## 2024-04-15 PROCEDURE — 97802 MEDICAL NUTRITION INDIV IN: CPT | Mod: 2W

## 2024-04-24 ENCOUNTER — APPOINTMENT (OUTPATIENT)
Dept: PULMONOLOGY | Facility: CLINIC | Age: 69
End: 2024-04-24
Payer: MEDICARE

## 2024-04-24 LAB
BASOPHILS # BLD AUTO: 0.05 K/UL
BASOPHILS NFR BLD AUTO: 0.7 %
EOSINOPHIL # BLD AUTO: 0.16 K/UL
EOSINOPHIL NFR BLD AUTO: 2.3 %
HCT VFR BLD CALC: 44.5 %
HGB BLD-MCNC: 14.4 G/DL
IMM GRANULOCYTES NFR BLD AUTO: 0.3 %
LYMPHOCYTES # BLD AUTO: 2.82 K/UL
LYMPHOCYTES NFR BLD AUTO: 40.6 %
MAN DIFF?: NORMAL
MCHC RBC-ENTMCNC: 29.1 PG
MCHC RBC-ENTMCNC: 32.4 GM/DL
MCV RBC AUTO: 89.9 FL
MONOCYTES # BLD AUTO: 0.61 K/UL
MONOCYTES NFR BLD AUTO: 8.8 %
NEUTROPHILS # BLD AUTO: 3.28 K/UL
NEUTROPHILS NFR BLD AUTO: 47.3 %
PLATELET # BLD AUTO: 215 K/UL
RBC # BLD: 4.95 M/UL
RBC # FLD: 14 %
WBC # FLD AUTO: 6.94 K/UL

## 2024-04-25 ENCOUNTER — NON-APPOINTMENT (OUTPATIENT)
Age: 69
End: 2024-04-25

## 2024-04-25 LAB
25(OH)D3 SERPL-MCNC: 17.7 NG/ML
ALBUMIN SERPL ELPH-MCNC: 4.8 G/DL
ALP BLD-CCNC: 56 U/L
ALT SERPL-CCNC: 21 U/L
ANION GAP SERPL CALC-SCNC: 14 MMOL/L
APTT BLD: 34.9 SEC
AST SERPL-CCNC: 24 U/L
BILIRUB SERPL-MCNC: 0.4 MG/DL
BUN SERPL-MCNC: 18 MG/DL
CALCIUM SERPL-MCNC: 9.5 MG/DL
CHLORIDE SERPL-SCNC: 101 MMOL/L
CHOLEST SERPL-MCNC: 180 MG/DL
CO2 SERPL-SCNC: 26 MMOL/L
CREAT SERPL-MCNC: 0.73 MG/DL
EGFR: 91 ML/MIN/1.73M2
ESTIMATED AVERAGE GLUCOSE: 140 MG/DL
FERRITIN SERPL-MCNC: 747 NG/ML
FOLATE SERPL-MCNC: 6.9 NG/ML
GLUCOSE SERPL-MCNC: 128 MG/DL
HBA1C MFR BLD HPLC: 6.5 %
HDLC SERPL-MCNC: 67 MG/DL
INR PPP: 1.16 RATIO
IRON SATN MFR SERPL: 36 %
IRON SERPL-MCNC: 109 UG/DL
LDLC SERPL CALC-MCNC: 87 MG/DL
NONHDLC SERPL-MCNC: 113 MG/DL
POTASSIUM SERPL-SCNC: 4.3 MMOL/L
PROT SERPL-MCNC: 7 G/DL
PT BLD: 13.1 SEC
SODIUM SERPL-SCNC: 142 MMOL/L
TIBC SERPL-MCNC: 306 UG/DL
TRIGL SERPL-MCNC: 152 MG/DL
TSH SERPL-ACNC: 2.96 UIU/ML
UIBC SERPL-MCNC: 197 UG/DL
VIT B12 SERPL-MCNC: 502 PG/ML

## 2024-04-25 PROCEDURE — 95800 SLP STDY UNATTENDED: CPT | Mod: 52

## 2024-04-25 RX ORDER — ERGOCALCIFEROL 1.25 MG/1
1.25 MG CAPSULE, LIQUID FILLED ORAL
Qty: 8 | Refills: 0 | Status: COMPLETED | COMMUNITY
Start: 2024-04-25 | End: 2024-06-20

## 2024-04-26 PROCEDURE — 95800 SLP STDY UNATTENDED: CPT | Mod: 52

## 2024-04-30 LAB
VIT A SERPL-MCNC: 37.7 UG/DL
VIT B1 SERPL-MCNC: 138.2 NMOL/L
VIT B6 SERPL-MCNC: 12.4 UG/L
ZINC SERPL-MCNC: 83 UG/DL

## 2024-05-02 ENCOUNTER — APPOINTMENT (OUTPATIENT)
Dept: BARIATRICS | Facility: CLINIC | Age: 69
End: 2024-05-02
Payer: MEDICARE

## 2024-05-02 VITALS
OXYGEN SATURATION: 100 % | TEMPERATURE: 97.1 F | HEIGHT: 64 IN | HEART RATE: 97 BPM | SYSTOLIC BLOOD PRESSURE: 120 MMHG | BODY MASS INDEX: 39.95 KG/M2 | WEIGHT: 234 LBS | DIASTOLIC BLOOD PRESSURE: 82 MMHG

## 2024-05-02 DIAGNOSIS — R73.09 OTHER ABNORMAL GLUCOSE: ICD-10-CM

## 2024-05-02 DIAGNOSIS — E46 UNSPECIFIED PROTEIN-CALORIE MALNUTRITION: ICD-10-CM

## 2024-05-02 DIAGNOSIS — E78.5 HYPERLIPIDEMIA, UNSPECIFIED: ICD-10-CM

## 2024-05-02 DIAGNOSIS — Z72.3 LACK OF PHYSICAL EXERCISE: ICD-10-CM

## 2024-05-02 DIAGNOSIS — E55.9 VITAMIN D DEFICIENCY, UNSPECIFIED: ICD-10-CM

## 2024-05-02 LAB — VIT B2 SERPL-MCNC: 282 UG/L

## 2024-05-02 PROCEDURE — 99215 OFFICE O/P EST HI 40 MIN: CPT

## 2024-05-02 PROCEDURE — G2211 COMPLEX E/M VISIT ADD ON: CPT

## 2024-05-02 RX ORDER — METOPROLOL SUCCINATE 50 MG/1
50 TABLET, EXTENDED RELEASE ORAL DAILY
Refills: 0 | Status: ACTIVE | COMMUNITY
Start: 2023-07-07

## 2024-05-02 NOTE — PHYSICAL EXAM
[Normal] : affect appropriate [de-identified] : Well, healthy appearing adult  [de-identified] : EOMI, no conjunctival injection, anicteric  [de-identified] : No visualized JVD or audible bruits  [de-identified] : Regular rate, no audible carotid bruits or JVD appreciated  [de-identified] : Equal chest rise, non-labored respirations, no audible wheezing  [de-identified] : soft, NT, ND, no diastasis or hernias appreciated, incision of LAGB well healed  port LUQ non-tender, no erythema, swelling [de-identified] : BRIDGETTE

## 2024-05-02 NOTE — HISTORY OF PRESENT ILLNESS
[de-identified] : 68 year old F undergoing workup for single stage conversion of band to laparoscopic Gastric Bypass. Band was emptied 4/20/2023. Weight stable since last visit. Pt continues to try to make better food choices, consuming 2-3 meals/day, usually skips breakfast (not hungry). Drinking zero calorie liquid, 20-30oz/day. Pt walking for activities. Sleeping 5hr/night at baseline. No abdominal pain, reflux, nausea, vomiting, constipation or diarrhea. Pt started taking prescribed vitamin D for deficiency.

## 2024-05-02 NOTE — ASSESSMENT
[FreeTextEntry1] : 68 year old F undergoing workup for single stage conversion of band to laparoscopic Gastric Bypass. Band was emptied 4/20/2023. Weight stable since last visit. Doing well overall.  Reviewed nutrition and exercise guidelines Protein focus diet and 3 meals/day Increase daily zero calorie liquid intake - goal 64oz/day  Increase activities and keep track of steps - goal 8-10k steps/day Limit caffeine intake  Labs performed 4/25/2024, results reviewed already with pt through earlier phone conversation Vitamin D level 17.7 Continue vitamin D supplementation 1.25mg PO QWk x 8 wks  HbA1c 6.5% - elevated from prior values, advised pt to follow up with PCP for continuity of care Gluc 128 - was fasting bloodwork Otherwise no other significant abnormalities  Continue workup for Laparoscopic Sleeve Gastrectomy  No weigh-ins Await for recommendation from Pulmonary for moderate RUT Completed Cardiology testings - await clearance Completed GI evaluation Nutritionist Sarah Hernandez and Dr. BREA Ledezma Psychologist evaluations as scheduled All questions answered   Return to office in one month once workup is completed. Pt seen with Dr. Kowalski  Additional time spent before and after visit reviewing chart

## 2024-05-07 ENCOUNTER — APPOINTMENT (OUTPATIENT)
Dept: BARIATRICS/WEIGHT MGMT | Facility: CLINIC | Age: 69
End: 2024-05-07
Payer: MEDICARE

## 2024-05-07 DIAGNOSIS — Z78.9 OTHER SPECIFIED HEALTH STATUS: ICD-10-CM

## 2024-05-07 PROCEDURE — 90791 PSYCH DIAGNOSTIC EVALUATION: CPT | Mod: GT,2W

## 2024-05-07 NOTE — CURRENT PSYCHIATRIC SYMPTOMS
[Decreased Concentration] : no decrease in concentrating ability [Insomnia] : no insomnia disorder [Euphoria] : no euphoria [Dec Need For Sleep] : no decreased need for sleep [Thought Disorder] : ~T a thought disorder was not noted [Excessive Worry] : no excessive worries [Panic] : no panic disorder [de-identified] : hx of depression effectively treated with medication [FreeTextEntry1] : Beth saw a therapist for two years and started medication at 52 yrs old after her brother's suicide.    Five years ago, saw a psychiatrist again but opted to have her PCP prescribe  her medication as there was no therapy offered by the psychiatrist.    Currently prescribed Effexor XR by her PCP.
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
## 2024-05-07 NOTE — DISCUSSION/SUMMARY
[Educational materials provided] : Educational materials provided [Behavior plan developed] : Behavior plan developed [Strategies to improve adherence identified] : Strategies to improve adherence identified [Addtnl Health & Behavior Intervention: Individual] : Additional Health and Behavior Intervention: Individual [Addtnl Health & Behavior Intervention: Group] : Additional Health and Behavior Intervention: Group [Develop and refine illness management to behavior plan] : Develop and refine illness management to behavior plan [Identify, practice refine strategies to promote adherence to regimen] : Identify, practice refine strategies to promote adherence to regimen [FreeTextEntry1] : Based on the information presented in this assessment, Ms. MAYO does not have any current psychological contraindications for bariatric surgery.  Writer will follow with patient as needed for support in making behavioral changes. [de-identified] : Psychological factors (overeating, poor dietary choices) affecting other medical conditions (obesity and associated medical sequelae) Obesity Unspec Depr Disorder, In Remission on meds PTSD, In Remission [FreeTextEntry3] : Behavioral strategies were discussed to increase her coping skills and assist her in making lifestyle modifications.  Provided psychoeducational materials on changing eating behaviors in preparation for surgery.  It was recommended that she attend the post-surgery group sessions for further education and support to assist her in making lifestyle changes.  Additionally, it was recommended that she utilize writer and RD as needed to assist in making pre-surgical and post-surgical dietary and behavioral changes. [FreeTextEntry4] : decrease ETOH intake consume B daily cessation of nightime grazing [FreeTextEntry5] : compliance with dietary and behavioral recommendations  [FreeTextEntry6] : reduction of obesity related co-morbidities; reduced risk of further medical sequelae of obesity

## 2024-05-07 NOTE — HISTORY OF PRESENT ILLNESS
[Genetics] : genetics [Large Portions] : large portions [Emotional Eating] : emotional eating [Decrease Activity] : decrease activity [Emotional Eating] : emotional eating  [Grazing] : grazing  [Quantity Eating] : quantity eating [Poor Choices] : poor choices [Secretive Eating] : secretive eating  [de-identified] : Pt is seeking a single stage revision from lap band (Dr Kothari 2013) to sleeve gastrectomy.  Pt's motivation for surgery is to improve her health, including RUT, HTN and elevated A1C,  and avoid further medical sequelae of obesity.  Per pt,her highest weight is her current weight of 234 lbs and her lowest weight was 180 lbs in 2014.  Her stated goal weight is  to return to 170-180 lbs and she expresses intent to make behavioral and dietary changes towards obtaining optimal results.  [de-identified] : single stage revision to sleeve gastrectomy [de-identified] : 1 yr:  speaking with others who have had bariatric surgery; discussions with surgeon; attending nutrition education class; and reading educational materials provided [de-identified] : none.  Pt denies ED hx and bxs, including binge eating. [de-identified] : A current typical day of eating is reported as follows: B: coffee L at 2pm:  sandwich D at 7pm:  pasta or take out (fish tacos, burger, pizza) S at 11pm: grazes on cheez its, popcorn Drinks 3 glasses of wine with lots of ice nightly, 2-3 cups of coffee w/FF creamer in the AM, drinks approx 24 oz of water/day. [de-identified] : lap band surgery, Weight Watchers, Nito, Orlistat, food journaling, low carb diet, portion control and increased physical activity (stationary bike).  Despite multiple attempts at diet and exercise modifications, patient reports inability to achieve and maintain significant weight loss.

## 2024-05-07 NOTE — REASON FOR VISIT
[Home] : at home, [unfilled] , at the time of the visit. [Other Location: e.g. Home (Enter Location, City,State)___] : at [unfilled] [Patient] : the patient [Initial Consult] : an initial consult for [Morbid Obesity (BMI>40)] : morbid obesity (bmi>40) [Referring By:  ___] : ~Tin Ln~ was referred for psychological evaluation by Dr. BUSCH [Attempted Weight Loss] : attempted weight loss [Commitment to Modified Lifestyle] : commitment to a modified lifestyle pre and post surgery [Difficulties with Diet Compliance] : difficulties with diet compliance  [Expectations of Outcome] : expectations of outcome [Motivation for Selecting Surgery] : motivation for selecting surgery [Strength of Social Support System] : strength of social support system [Patient Understands Data May be Shared] : patient understands that the information discussed during the evaluation would be shared with referring provider and possibly with ~his/her~ insurance provider [This encounter was initiated by telehealth (audio with video) and converted to telephone (audio only) due to technical difficulties.] : This encounter was initiated by telehealth (audio with video) and converted to telephone (audio only) due to technical difficulties. [de-identified] : for evaluation of psychological appropriateness for bariatric surgery [de-identified] : Confidentiality and its limitations were explained.
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
## 2024-05-08 ENCOUNTER — APPOINTMENT (OUTPATIENT)
Dept: PULMONOLOGY | Facility: CLINIC | Age: 69
End: 2024-05-08
Payer: MEDICARE

## 2024-05-08 VITALS — SYSTOLIC BLOOD PRESSURE: 134 MMHG | HEART RATE: 86 BPM | OXYGEN SATURATION: 97 % | DIASTOLIC BLOOD PRESSURE: 85 MMHG

## 2024-05-08 DIAGNOSIS — G47.33 OBSTRUCTIVE SLEEP APNEA (ADULT) (PEDIATRIC): ICD-10-CM

## 2024-05-08 PROCEDURE — 99213 OFFICE O/P EST LOW 20 MIN: CPT

## 2024-05-15 ENCOUNTER — APPOINTMENT (OUTPATIENT)
Dept: BARIATRICS/WEIGHT MGMT | Facility: CLINIC | Age: 69
End: 2024-05-15
Payer: MEDICARE

## 2024-05-15 DIAGNOSIS — E66.01 MORBID (SEVERE) OBESITY DUE TO EXCESS CALORIES: ICD-10-CM

## 2024-05-15 PROCEDURE — 97803 MED NUTRITION INDIV SUBSEQ: CPT | Mod: 2W

## 2024-05-28 NOTE — DISCHARGE NOTE NURSING/CASE MANAGEMENT/SOCIAL WORK - NURSING SECTION COMPLETE
Pt called stating his stream has been weaker at times.  He does not feel he is emptying his bladder fully.  He denies any discomfort or hematuria.  He is scheduled for a prostate biopsy on Thursday with Dr Marshall.  Discussed with MD.  Per MD, pt will have a cystoscopy at that time as well.  Pt is agreeable.      Eleonora, can you add cysto to case as well?   Thanks!   Patient/Caregiver provided printed discharge information.

## 2024-05-29 ENCOUNTER — RX RENEWAL (OUTPATIENT)
Age: 69
End: 2024-05-29

## 2024-05-29 RX ORDER — VENLAFAXINE HYDROCHLORIDE 225 MG/1
225 TABLET, EXTENDED RELEASE ORAL
Qty: 30 | Refills: 0 | Status: ACTIVE | COMMUNITY
Start: 2019-01-16 | End: 1900-01-01

## 2024-06-06 ENCOUNTER — APPOINTMENT (OUTPATIENT)
Dept: BARIATRICS | Facility: CLINIC | Age: 69
End: 2024-06-06
Payer: MEDICARE

## 2024-06-06 ENCOUNTER — NON-APPOINTMENT (OUTPATIENT)
Age: 69
End: 2024-06-06

## 2024-06-06 VITALS
HEIGHT: 64 IN | OXYGEN SATURATION: 98 % | WEIGHT: 235 LBS | BODY MASS INDEX: 40.12 KG/M2 | SYSTOLIC BLOOD PRESSURE: 130 MMHG | TEMPERATURE: 97.7 F | HEART RATE: 63 BPM | DIASTOLIC BLOOD PRESSURE: 90 MMHG

## 2024-06-06 DIAGNOSIS — R10.13 EPIGASTRIC PAIN: ICD-10-CM

## 2024-06-06 DIAGNOSIS — R93.3 ABNORMAL FINDINGS ON DIAGNOSTIC IMAGING OF OTHER PARTS OF DIGESTIVE TRACT: ICD-10-CM

## 2024-06-06 DIAGNOSIS — K21.9 GASTRO-ESOPHAGEAL REFLUX DISEASE W/OUT ESOPHAGITIS: ICD-10-CM

## 2024-06-06 DIAGNOSIS — Z98.84 BARIATRIC SURGERY STATUS: ICD-10-CM

## 2024-06-06 DIAGNOSIS — R63.5 ABNORMAL WEIGHT GAIN: ICD-10-CM

## 2024-06-06 PROCEDURE — 99215 OFFICE O/P EST HI 40 MIN: CPT

## 2024-06-07 NOTE — ASSESSMENT
[FreeTextEntry1] : No pulmonary objection to proceed with bariatric surgery.  Standard emilia precautions should be taken post operatively.  Patient would like to use weight loss as primary treatment of emilia at this time, she feels she is sleeping well, she has declined treatment with cpap at this time.  She will fu after a significant amount of weight loss and we will retest her.

## 2024-06-07 NOTE — PROCEDURE
[FreeTextEntry1] : Reviewed:  HST: moderate emilia  CXR: clear lungs, no focal consolidation or pleural effusions, cardiac silhouette appears enlarged.  No bony abnormality.  PFT: Normal spirometry.  Lung volumes normal. DLCO normal.

## 2024-06-07 NOTE — HISTORY OF PRESENT ILLNESS
[Never] : never [TextBox_4] : visit to review sleep study moderate emilia needs clearance for bariatric surgery  Prior hx:  68F with HTN, afib, meningioma s/p resection, obesity s/p lap band, emilia previoulsy on cpap here for pulmonary clearance prior to bariatric surgery.  Had been on cpap prior to lap band, lost weight then stopped.  Has since gained weight back.  Denies snoring/choking, feels well rested.  No other pulm hx, never smoker.  No issues with anesthesia in the past.  Denies sob/cough/wheeze.

## 2024-06-07 NOTE — CONSULT LETTER
[FreeTextEntry1] : Dear ,  I had the pleasure of evaluating your patient, MANDO MAYO today in pulmonary consultation.  Please refer to my attached note for my findings and recommendations.   Thank you for allowing me to participate in the care of your patient, please feel free to call with any questions or concerns.   Sincerely,  Oneida Seaman MD [facility]  [facility]

## 2024-06-11 ENCOUNTER — TRANSCRIPTION ENCOUNTER (OUTPATIENT)
Age: 69
End: 2024-06-11

## 2024-06-11 ENCOUNTER — APPOINTMENT (OUTPATIENT)
Dept: BARIATRICS/WEIGHT MGMT | Facility: CLINIC | Age: 69
End: 2024-06-11
Payer: MEDICARE

## 2024-06-11 PROCEDURE — 90832 PSYTX W PT 30 MINUTES: CPT | Mod: GT,2W

## 2024-06-11 NOTE — HISTORY OF PRESENT ILLNESS
[de-identified] : Pt is seeking a single stage revision from lap band (Dr Kothari 2013) to sleeve gastrectomy.  Pt's motivation for surgery is to improve her health, including RUT, HTN and elevated A1C,  and avoid further medical sequelae of obesity.  Per pt,her highest weight is her current weight of 234 lbs and her lowest weight was 180 lbs in 2014.  Her stated goal weight is  to return to 170-180 lbs and she expresses intent to make behavioral and dietary changes towards obtaining optimal results.

## 2024-06-11 NOTE — DISCUSSION/SUMMARY
[Conventional grooming and hygiene] : Conventional grooming and hygiene [Calm, cooperative] : Calm, cooperative [No unusual behaviors, movements, etc.] : No unusual behaviors, movements, etc. [Normal Rate/Tone/Volume] : Normal Rate/Tone/Volume [Normal Range] : Normal Range [Euthymic] : Euthymic [Logical, Goal Directed] : Logical, Goal Directed [Educational materials provided] : Educational materials provided [Behavior plan developed] : Behavior plan developed [Strategies to improve adherence identified] : Strategies to improve adherence identified [Addtnl Health & Behavior Intervention: Individual] : Additional Health and Behavior Intervention: Individual [Addtnl Health & Behavior Intervention: Group] : Additional Health and Behavior Intervention: Group [Develop and refine illness management to behavior plan] : Develop and refine illness management to behavior plan [Identify, practice refine strategies to promote adherence to regimen] : Identify, practice refine strategies to promote adherence to regimen [de-identified] : good [FreeTextEntry1] : Based on the information presented in her assessment, Ms. AMYO does not have any current psychological contraindications for bariatric surgery.  Writer will follow with patient  post-op as needed for support in making behavioral changes. [de-identified] : Psychological factors (overeating, poor dietary choices) affecting other medical conditions (obesity and associated medical sequelae) Obesity Unspec Depr Disorder, In Remission on meds PTSD, In Remission [FreeTextEntry3] : Reminded pt of services available pre- and post-surgically to assist in making and maintaining dietary and behavioral changes. Pt agreed to contact [facility] as needed for support.   [FreeTextEntry4] : cease ETOH intake consume B daily continued cessation of nightime grazing [FreeTextEntry5] : compliance with dietary and behavioral recommendations  [FreeTextEntry6] : reduction of obesity related co-morbidities; reduced risk of further medical sequelae of obesity

## 2024-06-11 NOTE — REASON FOR VISIT
[Follow-Up Visit] : a follow-up visit for [Morbid Obesity (BMI>40)] : morbid obesity (bmi>40) [Home] : at home, [unfilled] , at the time of the visit. [Other Location: e.g. Home (Enter Location, City,State)___] : at [unfilled] [Patient] : the patient [This encounter was initiated by telehealth (audio with video) and converted to telephone (audio only) due to technical difficulties.] : This encounter was initiated by telehealth (audio with video) and converted to telephone (audio only) due to technical difficulties. [de-identified] : Confidentiality and its limitations were explained.

## 2024-06-18 ENCOUNTER — TRANSCRIPTION ENCOUNTER (OUTPATIENT)
Age: 69
End: 2024-06-18

## 2024-06-18 DIAGNOSIS — R79.9 ABNORMAL FINDING OF BLOOD CHEMISTRY, UNSPECIFIED: ICD-10-CM

## 2024-06-18 DIAGNOSIS — Z00.00 ENCOUNTER FOR GENERAL ADULT MEDICAL EXAMINATION W/OUT ABNORMAL FINDINGS: ICD-10-CM

## 2024-06-18 DIAGNOSIS — E56.9 VITAMIN DEFICIENCY, UNSPECIFIED: ICD-10-CM

## 2024-06-18 DIAGNOSIS — R63.2 POLYPHAGIA: ICD-10-CM

## 2024-06-20 PROBLEM — R10.13 ABDOMINAL DISCOMFORT, EPIGASTRIC: Status: ACTIVE | Noted: 2023-04-20

## 2024-06-20 PROBLEM — R93.3 ABNORMAL UGI SERIES: Status: ACTIVE | Noted: 2023-06-22

## 2024-06-20 PROBLEM — R63.5 WEIGHT GAIN: Status: ACTIVE | Noted: 2018-09-13

## 2024-06-20 PROBLEM — K21.9 ACID REFLUX: Status: ACTIVE | Noted: 2023-04-20

## 2024-06-20 NOTE — HISTORY OF PRESENT ILLNESS
[Procedure: ___] : Procedure performed: [unfilled]  [Date of Surgery: ___] : Date of Surgery:   [unfilled] [Surgeon Name:   ___] : Surgeon Name: Dr. BUSCH

## 2024-06-20 NOTE — ASSESSMENT
[FreeTextEntry1] : Schedule for Single Stage Revision to Sleeve Gastrectomy. Once again, Risk, Benefits, and Alternatives to surgery have been discussed.  This includes but is not limited to bleeding, infection, damage to adjacent structures, need for additional surgery or interventions, adverse effects of anesthesia such as cardio-respiratory complications, prolonged intubation, cardiac arrhythmia, arrest, and or death.  Risks of forgoing surgery have also been discussed including progression of, and/or worsening of current condition which may then require urgent or emergent treatment or surgery.  Although I would expect single stage revision, we've also discussed potential for staged procedure with removal of band first (in the event of complication, excessive scar tissue, bleeding, etc) with subsequent return for sleeve in future if amenable.    Will have PST arranged.  Pre Op Med/Card clearances.  Nutritional and weight loss counseling has been provided.  PreOp modified liquid diet reviewed and all questions answered.  The importance of weight reduction in the treatment of Obesity and its contribution to resolution of obesity related comorbidities has been discussed.  The patient is encouraged to remain calorie conscious and continue a low fat, low carbohydrate, high protein diet. Eat slowly and chew food well.  Avoid eating and drinking simultaneously.  Also, emphasis has been placed on the importance of adequate hydration, multi-vitamin supplementation and exercise.  (15 min)

## 2024-07-01 ENCOUNTER — OUTPATIENT (OUTPATIENT)
Dept: OUTPATIENT SERVICES | Facility: HOSPITAL | Age: 69
LOS: 1 days | End: 2024-07-01
Payer: MEDICARE

## 2024-07-01 VITALS
TEMPERATURE: 98 F | RESPIRATION RATE: 16 BRPM | DIASTOLIC BLOOD PRESSURE: 84 MMHG | HEIGHT: 64.5 IN | HEART RATE: 94 BPM | SYSTOLIC BLOOD PRESSURE: 124 MMHG | WEIGHT: 240.52 LBS | OXYGEN SATURATION: 97 %

## 2024-07-01 DIAGNOSIS — Z01.818 ENCOUNTER FOR OTHER PREPROCEDURAL EXAMINATION: ICD-10-CM

## 2024-07-01 DIAGNOSIS — Z98.890 OTHER SPECIFIED POSTPROCEDURAL STATES: Chronic | ICD-10-CM

## 2024-07-01 DIAGNOSIS — I10 ESSENTIAL (PRIMARY) HYPERTENSION: ICD-10-CM

## 2024-07-01 DIAGNOSIS — Z98.84 BARIATRIC SURGERY STATUS: Chronic | ICD-10-CM

## 2024-07-01 DIAGNOSIS — E66.01 MORBID (SEVERE) OBESITY DUE TO EXCESS CALORIES: ICD-10-CM

## 2024-07-01 DIAGNOSIS — G47.33 OBSTRUCTIVE SLEEP APNEA (ADULT) (PEDIATRIC): ICD-10-CM

## 2024-07-01 LAB
ALBUMIN SERPL ELPH-MCNC: 3.8 G/DL — SIGNIFICANT CHANGE UP (ref 3.3–5)
ALP SERPL-CCNC: 54 U/L — SIGNIFICANT CHANGE UP (ref 30–120)
ALT FLD-CCNC: 27 U/L — SIGNIFICANT CHANGE UP (ref 10–60)
ANION GAP SERPL CALC-SCNC: 5 MMOL/L — SIGNIFICANT CHANGE UP (ref 5–17)
AST SERPL-CCNC: 15 U/L — SIGNIFICANT CHANGE UP (ref 10–40)
BILIRUB SERPL-MCNC: 0.4 MG/DL — SIGNIFICANT CHANGE UP (ref 0.2–1.2)
BLD GP AB SCN SERPL QL: SIGNIFICANT CHANGE UP
BUN SERPL-MCNC: 17 MG/DL — SIGNIFICANT CHANGE UP (ref 7–23)
CALCIUM SERPL-MCNC: 9.2 MG/DL — SIGNIFICANT CHANGE UP (ref 8.4–10.5)
CHLORIDE SERPL-SCNC: 105 MMOL/L — SIGNIFICANT CHANGE UP (ref 96–108)
CO2 SERPL-SCNC: 30 MMOL/L — SIGNIFICANT CHANGE UP (ref 22–31)
CREAT SERPL-MCNC: 0.83 MG/DL — SIGNIFICANT CHANGE UP (ref 0.5–1.3)
EGFR: 78 ML/MIN/1.73M2 — SIGNIFICANT CHANGE UP
GLUCOSE SERPL-MCNC: 160 MG/DL — HIGH (ref 70–99)
HCT VFR BLD CALC: 42.8 % — SIGNIFICANT CHANGE UP (ref 34.5–45)
HGB BLD-MCNC: 14 G/DL — SIGNIFICANT CHANGE UP (ref 11.5–15.5)
MCHC RBC-ENTMCNC: 29 PG — SIGNIFICANT CHANGE UP (ref 27–34)
MCHC RBC-ENTMCNC: 32.7 GM/DL — SIGNIFICANT CHANGE UP (ref 32–36)
MCV RBC AUTO: 88.8 FL — SIGNIFICANT CHANGE UP (ref 80–100)
NRBC # BLD: 0 /100 WBCS — SIGNIFICANT CHANGE UP (ref 0–0)
PLATELET # BLD AUTO: 194 K/UL — SIGNIFICANT CHANGE UP (ref 150–400)
POTASSIUM SERPL-MCNC: 4.5 MMOL/L — SIGNIFICANT CHANGE UP (ref 3.5–5.3)
POTASSIUM SERPL-SCNC: 4.5 MMOL/L — SIGNIFICANT CHANGE UP (ref 3.5–5.3)
PROT SERPL-MCNC: 7.2 G/DL — SIGNIFICANT CHANGE UP (ref 6–8.3)
RBC # BLD: 4.82 M/UL — SIGNIFICANT CHANGE UP (ref 3.8–5.2)
RBC # FLD: 13.2 % — SIGNIFICANT CHANGE UP (ref 10.3–14.5)
SODIUM SERPL-SCNC: 140 MMOL/L — SIGNIFICANT CHANGE UP (ref 135–145)
WBC # BLD: 5.92 K/UL — SIGNIFICANT CHANGE UP (ref 3.8–10.5)
WBC # FLD AUTO: 5.92 K/UL — SIGNIFICANT CHANGE UP (ref 3.8–10.5)

## 2024-07-01 PROCEDURE — G0463: CPT

## 2024-07-01 PROCEDURE — 36415 COLL VENOUS BLD VENIPUNCTURE: CPT

## 2024-07-01 PROCEDURE — 86850 RBC ANTIBODY SCREEN: CPT

## 2024-07-01 PROCEDURE — 86900 BLOOD TYPING SEROLOGIC ABO: CPT

## 2024-07-01 PROCEDURE — 80053 COMPREHEN METABOLIC PANEL: CPT

## 2024-07-01 PROCEDURE — 86901 BLOOD TYPING SEROLOGIC RH(D): CPT

## 2024-07-01 PROCEDURE — 85027 COMPLETE CBC AUTOMATED: CPT

## 2024-07-01 RX ORDER — RIVAROXABAN 15 MG-20MG
1 KIT ORAL
Refills: 0 | DISCHARGE

## 2024-07-01 RX ORDER — METOPROLOL TARTRATE 50 MG
1 TABLET ORAL
Refills: 0 | DISCHARGE

## 2024-07-01 RX ORDER — VENLAFAXINE HCL 75 MG
1 CAPSULE, EXT RELEASE 24 HR ORAL
Refills: 0 | DISCHARGE

## 2024-07-01 RX ORDER — RAMIPRIL 5 MG
1 CAPSULE ORAL
Refills: 0 | DISCHARGE

## 2024-07-01 RX ORDER — ROSUVASTATIN CALCIUM 5 MG/1
1 TABLET ORAL
Refills: 0 | DISCHARGE

## 2024-07-03 DIAGNOSIS — Z98.84 BARIATRIC SURGERY STATUS: ICD-10-CM

## 2024-07-03 RX ORDER — ONDANSETRON 4 MG/1
4 TABLET, ORALLY DISINTEGRATING ORAL EVERY 8 HOURS
Qty: 10 | Refills: 0 | Status: COMPLETED | COMMUNITY
Start: 2024-07-03 | End: 2024-07-18

## 2024-07-03 RX ORDER — OXYCODONE 5 MG/1
5 TABLET ORAL EVERY 6 HOURS
Qty: 3 | Refills: 0 | Status: COMPLETED | COMMUNITY
Start: 2024-07-03 | End: 2024-07-04

## 2024-07-03 RX ORDER — OMEPRAZOLE 20 MG/1
20 CAPSULE, DELAYED RELEASE ORAL DAILY
Qty: 30 | Refills: 0 | Status: ACTIVE | COMMUNITY
Start: 2024-07-03 | End: 1900-01-01

## 2024-07-08 ENCOUNTER — APPOINTMENT (OUTPATIENT)
Dept: INTERNAL MEDICINE | Facility: CLINIC | Age: 69
End: 2024-07-08
Payer: MEDICARE

## 2024-07-08 VITALS
TEMPERATURE: 98.3 F | BODY MASS INDEX: 39.44 KG/M2 | RESPIRATION RATE: 20 BRPM | OXYGEN SATURATION: 97 % | HEART RATE: 93 BPM | WEIGHT: 231 LBS | HEIGHT: 64 IN

## 2024-07-08 VITALS — SYSTOLIC BLOOD PRESSURE: 118 MMHG | DIASTOLIC BLOOD PRESSURE: 74 MMHG

## 2024-07-08 DIAGNOSIS — Z01.818 ENCOUNTER FOR OTHER PREPROCEDURAL EXAMINATION: ICD-10-CM

## 2024-07-08 DIAGNOSIS — G47.33 OBSTRUCTIVE SLEEP APNEA (ADULT) (PEDIATRIC): ICD-10-CM

## 2024-07-08 DIAGNOSIS — I10 ESSENTIAL (PRIMARY) HYPERTENSION: ICD-10-CM

## 2024-07-08 DIAGNOSIS — F32.A DEPRESSION, UNSPECIFIED: ICD-10-CM

## 2024-07-08 PROCEDURE — G2211 COMPLEX E/M VISIT ADD ON: CPT

## 2024-07-08 PROCEDURE — 99214 OFFICE O/P EST MOD 30 MIN: CPT

## 2024-07-08 RX ORDER — VENLAFAXINE HYDROCHLORIDE 75 MG/1
75 CAPSULE, EXTENDED RELEASE ORAL DAILY
Qty: 90 | Refills: 0 | Status: ACTIVE | COMMUNITY
Start: 2024-07-08 | End: 1900-01-01

## 2024-07-09 RX ORDER — HYDROMORPHONE HCL 0.2 MG/ML
0.5 INJECTION, SOLUTION INTRAVENOUS EVERY 4 HOURS
Refills: 0 | Status: DISCONTINUED | OUTPATIENT
Start: 2024-07-17 | End: 2024-07-18

## 2024-07-09 RX ORDER — DEXTROSE MONOHYDRATE AND SODIUM CHLORIDE 5; .3 G/100ML; G/100ML
1000 INJECTION, SOLUTION INTRAVENOUS
Refills: 0 | Status: DISCONTINUED | OUTPATIENT
Start: 2024-07-17 | End: 2024-07-18

## 2024-07-09 RX ORDER — ONDANSETRON HYDROCHLORIDE 2 MG/ML
4 INJECTION INTRAMUSCULAR; INTRAVENOUS EVERY 6 HOURS
Refills: 0 | Status: DISCONTINUED | OUTPATIENT
Start: 2024-07-17 | End: 2024-07-18

## 2024-07-09 RX ORDER — PANTOPRAZOLE SODIUM 40 MG/10ML
40 INJECTION, POWDER, FOR SOLUTION INTRAVENOUS DAILY
Refills: 0 | Status: DISCONTINUED | OUTPATIENT
Start: 2024-07-17 | End: 2024-07-18

## 2024-07-09 RX ORDER — OXYCODONE HYDROCHLORIDE 100 MG/5ML
5 SOLUTION ORAL EVERY 6 HOURS
Refills: 0 | Status: DISCONTINUED | OUTPATIENT
Start: 2024-07-18 | End: 2024-07-18

## 2024-07-09 RX ORDER — METOPROLOL TARTRATE 50 MG
5 TABLET ORAL EVERY 6 HOURS
Refills: 0 | Status: DISCONTINUED | OUTPATIENT
Start: 2024-07-17 | End: 2024-07-18

## 2024-07-09 RX ORDER — SIMETHICONE 40MG/0.6ML
80 SUSPENSION, DROPS(FINAL DOSAGE FORM)(ML) ORAL EVERY 6 HOURS
Refills: 0 | Status: DISCONTINUED | OUTPATIENT
Start: 2024-07-17 | End: 2024-07-18

## 2024-07-09 RX ORDER — HYOSCYAMINE SULFATE 0.125 MG
0.12 TABLET,DISINTEGRATING ORAL EVERY 6 HOURS
Refills: 0 | Status: DISCONTINUED | OUTPATIENT
Start: 2024-07-17 | End: 2024-07-18

## 2024-07-09 RX ORDER — ENOXAPARIN SODIUM 100 MG/ML
40 INJECTION SUBCUTANEOUS EVERY 24 HOURS
Refills: 0 | Status: DISCONTINUED | OUTPATIENT
Start: 2024-07-17 | End: 2024-07-18

## 2024-07-17 ENCOUNTER — INPATIENT (INPATIENT)
Facility: HOSPITAL | Age: 69
LOS: 0 days | Discharge: HOME | DRG: 641 | End: 2024-07-18
Attending: SURGERY | Admitting: SURGERY
Payer: MEDICARE

## 2024-07-17 ENCOUNTER — TRANSCRIPTION ENCOUNTER (OUTPATIENT)
Age: 69
End: 2024-07-17

## 2024-07-17 ENCOUNTER — RESULT REVIEW (OUTPATIENT)
Age: 69
End: 2024-07-17

## 2024-07-17 ENCOUNTER — APPOINTMENT (OUTPATIENT)
Dept: BARIATRICS | Facility: HOSPITAL | Age: 69
End: 2024-07-17

## 2024-07-17 VITALS
HEART RATE: 73 BPM | DIASTOLIC BLOOD PRESSURE: 78 MMHG | WEIGHT: 227.74 LBS | HEIGHT: 63 IN | OXYGEN SATURATION: 100 % | RESPIRATION RATE: 15 BRPM | SYSTOLIC BLOOD PRESSURE: 130 MMHG | TEMPERATURE: 98 F

## 2024-07-17 DIAGNOSIS — I10 ESSENTIAL (PRIMARY) HYPERTENSION: ICD-10-CM

## 2024-07-17 DIAGNOSIS — E66.01 MORBID (SEVERE) OBESITY DUE TO EXCESS CALORIES: ICD-10-CM

## 2024-07-17 DIAGNOSIS — Z98.84 BARIATRIC SURGERY STATUS: Chronic | ICD-10-CM

## 2024-07-17 DIAGNOSIS — Z01.818 ENCOUNTER FOR OTHER PREPROCEDURAL EXAMINATION: ICD-10-CM

## 2024-07-17 DIAGNOSIS — Z98.890 OTHER SPECIFIED POSTPROCEDURAL STATES: Chronic | ICD-10-CM

## 2024-07-17 PROCEDURE — 43775 LAP SLEEVE GASTRECTOMY: CPT

## 2024-07-17 PROCEDURE — 43281 LAP PARAESOPHAG HERN REPAIR: CPT | Mod: 59

## 2024-07-17 PROCEDURE — 99222 1ST HOSP IP/OBS MODERATE 55: CPT

## 2024-07-17 PROCEDURE — 43774 LAP RMVL GASTR ADJ ALL PARTS: CPT | Mod: 59

## 2024-07-17 PROCEDURE — 88300 SURGICAL PATH GROSS: CPT | Mod: 26

## 2024-07-17 PROCEDURE — 93010 ELECTROCARDIOGRAM REPORT: CPT

## 2024-07-17 PROCEDURE — 88307 TISSUE EXAM BY PATHOLOGIST: CPT | Mod: 26

## 2024-07-17 DEVICE — STAPLER COVIDIEN TRI-STAPLE 60MM PURPLE INTELLIGENT RELOAD
Type: IMPLANTABLE DEVICE | Status: NON-FUNCTIONAL
Removed: 2024-07-17

## 2024-07-17 DEVICE — VISTASEAL FIBRIN HUMAN 4ML
Type: IMPLANTABLE DEVICE | Status: NON-FUNCTIONAL
Removed: 2024-07-17

## 2024-07-17 RX ORDER — METOPROLOL TARTRATE 50 MG
1 TABLET ORAL
Refills: 0 | DISCHARGE

## 2024-07-17 RX ORDER — DEXTROSE MONOHYDRATE AND SODIUM CHLORIDE 5; .3 G/100ML; G/100ML
1000 INJECTION, SOLUTION INTRAVENOUS
Refills: 0 | Status: DISCONTINUED | OUTPATIENT
Start: 2024-07-17 | End: 2024-07-17

## 2024-07-17 RX ORDER — BIOTIN/FOLIC AC/VIT BCOMP,C/ZN 3MG-0.8MG
2 TABLET ORAL
Refills: 0 | DISCHARGE

## 2024-07-17 RX ORDER — ACETAMINOPHEN 325 MG
1000 TABLET ORAL EVERY 6 HOURS
Refills: 0 | Status: DISCONTINUED | OUTPATIENT
Start: 2024-07-18 | End: 2024-07-18

## 2024-07-17 RX ORDER — ACETAMINOPHEN 325 MG
1000 TABLET ORAL ONCE
Refills: 0 | Status: COMPLETED | OUTPATIENT
Start: 2024-07-17 | End: 2024-07-17

## 2024-07-17 RX ORDER — OMEPRAZOLE 10 MG/1
1 CAPSULE, DELAYED RELEASE ORAL
Refills: 0 | DISCHARGE

## 2024-07-17 RX ORDER — HYDROMORPHONE HCL 0.2 MG/ML
0.5 INJECTION, SOLUTION INTRAVENOUS
Refills: 0 | Status: DISCONTINUED | OUTPATIENT
Start: 2024-07-17 | End: 2024-07-17

## 2024-07-17 RX ORDER — HYDROMORPHONE HCL 0.2 MG/ML
1 INJECTION, SOLUTION INTRAVENOUS ONCE
Refills: 0 | Status: DISCONTINUED | OUTPATIENT
Start: 2024-07-17 | End: 2024-07-17

## 2024-07-17 RX ORDER — CEFAZOLIN 10 G/1
2000 INJECTION, POWDER, FOR SOLUTION INTRAVENOUS ONCE
Refills: 0 | Status: COMPLETED | OUTPATIENT
Start: 2024-07-17 | End: 2024-07-17

## 2024-07-17 RX ORDER — FOSAPREPITANT DIMEGLUMINE 150 MG/5ML
150 INJECTION, POWDER, LYOPHILIZED, FOR SOLUTION INTRAVENOUS ONCE
Refills: 0 | Status: COMPLETED | OUTPATIENT
Start: 2024-07-17 | End: 2024-07-17

## 2024-07-17 RX ORDER — ONDANSETRON HYDROCHLORIDE 2 MG/ML
1 INJECTION INTRAMUSCULAR; INTRAVENOUS
Refills: 0 | DISCHARGE

## 2024-07-17 RX ORDER — ONDANSETRON HYDROCHLORIDE 2 MG/ML
4 INJECTION INTRAMUSCULAR; INTRAVENOUS ONCE
Refills: 0 | Status: DISCONTINUED | OUTPATIENT
Start: 2024-07-17 | End: 2024-07-17

## 2024-07-17 RX ORDER — METOPROLOL TARTRATE 50 MG
100 TABLET ORAL DAILY
Refills: 0 | Status: DISCONTINUED | OUTPATIENT
Start: 2024-07-17 | End: 2024-07-17

## 2024-07-17 RX ORDER — VENLAFAXINE 37.5 MG/1
225 CAPSULE, EXTENDED RELEASE ORAL DAILY
Refills: 0 | Status: DISCONTINUED | OUTPATIENT
Start: 2024-07-18 | End: 2024-07-18

## 2024-07-17 RX ORDER — ACETAMINOPHEN 325 MG
1000 TABLET ORAL EVERY 6 HOURS
Refills: 0 | Status: COMPLETED | OUTPATIENT
Start: 2024-07-17 | End: 2024-07-18

## 2024-07-17 RX ORDER — ATORVASTATIN CALCIUM 20 MG/1
80 TABLET, FILM COATED ORAL DAILY
Refills: 0 | Status: DISCONTINUED | OUTPATIENT
Start: 2024-07-18 | End: 2024-07-18

## 2024-07-17 RX ORDER — ENOXAPARIN SODIUM 100 MG/ML
40 INJECTION SUBCUTANEOUS ONCE
Refills: 0 | Status: COMPLETED | OUTPATIENT
Start: 2024-07-17 | End: 2024-07-17

## 2024-07-17 RX ORDER — SODIUM CHLORIDE 0.9 % (FLUSH) 0.9 %
2000 SYRINGE (ML) INJECTION ONCE
Refills: 0 | Status: COMPLETED | OUTPATIENT
Start: 2024-07-17 | End: 2024-07-17

## 2024-07-17 RX ORDER — OXYCODONE HYDROCHLORIDE 100 MG/5ML
1 SOLUTION ORAL
Refills: 0 | DISCHARGE

## 2024-07-17 RX ADMIN — Medication 400 MILLIGRAM(S): at 12:16

## 2024-07-17 RX ADMIN — DEXTROSE MONOHYDRATE AND SODIUM CHLORIDE 150 MILLILITER(S): 5; .3 INJECTION, SOLUTION INTRAVENOUS at 14:47

## 2024-07-17 RX ADMIN — FOSAPREPITANT DIMEGLUMINE 300 MILLIGRAM(S): 150 INJECTION, POWDER, LYOPHILIZED, FOR SOLUTION INTRAVENOUS at 07:05

## 2024-07-17 RX ADMIN — Medication 1000 MILLIGRAM(S): at 21:00

## 2024-07-17 RX ADMIN — Medication 1 APPLICATION(S): at 07:06

## 2024-07-17 RX ADMIN — Medication 5 MILLIGRAM(S): at 20:15

## 2024-07-17 RX ADMIN — ONDANSETRON HYDROCHLORIDE 4 MILLIGRAM(S): 2 INJECTION INTRAMUSCULAR; INTRAVENOUS at 18:41

## 2024-07-17 RX ADMIN — Medication 400 MILLIGRAM(S): at 14:46

## 2024-07-17 RX ADMIN — ENOXAPARIN SODIUM 40 MILLIGRAM(S): 100 INJECTION SUBCUTANEOUS at 07:06

## 2024-07-17 RX ADMIN — Medication 400 MILLIGRAM(S): at 20:27

## 2024-07-17 RX ADMIN — HYDROMORPHONE HCL 1 MILLIGRAM(S): 0.2 INJECTION, SOLUTION INTRAVENOUS at 11:06

## 2024-07-17 RX ADMIN — DEXTROSE MONOHYDRATE AND SODIUM CHLORIDE 75 MILLILITER(S): 5; .3 INJECTION, SOLUTION INTRAVENOUS at 10:58

## 2024-07-17 RX ADMIN — HYDROMORPHONE HCL 1 MILLIGRAM(S): 0.2 INJECTION, SOLUTION INTRAVENOUS at 10:59

## 2024-07-17 RX ADMIN — Medication 1000 MILLIGRAM(S): at 15:00

## 2024-07-17 RX ADMIN — Medication 1000 MILLILITER(S): at 07:05

## 2024-07-17 NOTE — DISCHARGE NOTE PROVIDER - CARE PROVIDER_API CALL
Jarred Kowalski  Surgery  [address] [place] [id number]  Phone: [phone]  Fax: [phone]  Follow Up Time:

## 2024-07-17 NOTE — BRIEF OPERATIVE NOTE - NSICDXBRIEFPREOP_GEN_ALL_CORE_FT
PRE-OP DIAGNOSIS:  Morbid obesity due to excess calories 17-Jul-2024 10:47:13 Mechanical port failure. Hiatal hernia Della Springer

## 2024-07-17 NOTE — DISCHARGE NOTE PROVIDER - HOSPITAL COURSE
Pt is a  year old female admitted to hospital on 7/17/24 and underwent staged conversion of laparoscopic removal of lap band, port, and tubing with takedown of gastric plication. Laparoscopic repair of hiatal hernia with intra op EGD. Patient tolerated procedure well and progressed appropriately. Currently tolerating Bariatric Phase 1 Clears diet, voiding independently with appropriate volume and ambulating. Nutritional guidelines were reviewed with Nutritionist and patient instructed to drink small frequent amounts, start protein drinks and follow dietary guidelines.  Discharged on 7/18/24 with home medications, incentive spirometer and PRESCRIPTIONS/MEDS TO BED (omeprazole, ondansetron, and oxycodone to follow-up with Bariatric Surgeon/Dr. LINDA Kowalski in 1 week.   Pt is a 69 y/o female admitted to hospital on 7/17/24 and underwent staged conversion of laparoscopic removal of lap band, port, and tubing with takedown of gastric plication. Laparoscopic repair of hiatal hernia with intra op EGD. Patient tolerated procedure well and progressed appropriately. Currently tolerating Bariatric Phase 1 Clears diet, voiding independently with appropriate volume and ambulating. Nutritional guidelines were reviewed with Nutritionist and patient instructed to drink small frequent amounts, start protein drinks and follow dietary guidelines.  Discharged on 7/18/24 with home medications, incentive spirometer and PRESCRIPTIONS/MEDS TO BED (omeprazole, ondansetron, and oxycodone) to follow-up with Bariatric Surgeon/Dr. LINDA Kowalski in 1 week.   Pt is a 67 y/o female admitted to hospital on 7/17/24 and underwent staged conversion of laparoscopic removal of lap band, port, and tubing with takedown of gastric plication. Laparoscopic sleeve gastrectomy with repair of hiatal hernia with intra op EGD. Patient tolerated procedure well and progressed appropriately. Currently tolerating Bariatric Phase 1 Clears diet, voiding independently with appropriate volume and ambulating. Nutritional guidelines were reviewed with Nutritionist and patient instructed to drink small frequent amounts, start protein drinks and follow dietary guidelines.  Discharged on 7/18/24 with home medications, incentive spirometer and PRESCRIPTIONS/MEDS TO BED (omeprazole, ondansetron, and oxycodone) to follow-up with Bariatric Surgeon/Dr. LINDA Kowalski in 1 week.

## 2024-07-17 NOTE — DISCHARGE NOTE PROVIDER - NSDCFUADDINST_GEN_ALL_CORE_FT
Crestor can be cut in half  Xarelto is a small pill  Toprol can be cut in half Crestor can be cut in half  Xarelto is a small pill  Toprol can be cut in half  Venlafaxine open and sprinkle in yogurt / applesauce/ pudding Increase ambulation and utilize incentive spirometry frequently.   Apply ice packs to abdominal wall/incision sites for 20 mins on/20 mins off every 4 hours for the next 24 hours and to shoulders as needed for discomfort.   Continue Bariatric Phase 1 Diet and follow nutritional guidelines as instructed.  Pain medications as needed. If taking narcotic pain medications, may take Colace/OTC stool softener (whole) as directed to prevent constipation.  Powdered medications can be mixed in low-fat yogurt or pudding, capsules can be opened and mixed with low-fat yogurt or pudding and swallowed (not chewed) or OR crush allowed medications and put in low-fat yogurt or pudding.  Wear binder for comfort and support.  Avoid heavy lifting in excess of 20-25 pounds and strenuous activities for duration of 4-6 weeks.  Call office with any questions or concerns.  Crestor can be cut in half  Xarelto is a small pill  Toprol can be cut in half  Venlafaxine open and sprinkle in yogurt / applesauce/ pudding Increase ambulation and utilize incentive spirometry frequently.   Apply ice packs to abdominal wall/incision sites for 20 mins on/20 mins off every 4 hours for the next 24 hours and to shoulders as needed for discomfort.   Continue Bariatric Phase 1 Diet and follow nutritional guidelines as instructed.  Pain medications as needed. If taking narcotic pain medications, may take Colace/OTC stool softener (whole) as directed to prevent constipation.  Powdered medications can be mixed in low-fat yogurt or pudding, capsules can be opened and mixed with low-fat yogurt or pudding and swallowed (not chewed) or OR crush allowed medications and put in low-fat yogurt or pudding.  Wear binder for comfort and support.  Avoid heavy lifting in excess of 20-25 pounds and strenuous activities for duration of 4-6 weeks.  Call office with any questions or concerns.  Crestor can be cut in half  Xarelto is a small pill, can resume from 7/19.    Toprol can be cut in half  Venlafaxine open and sprinkle in low fat yogurt / applesauce/ pudding

## 2024-07-17 NOTE — CONSULT NOTE ADULT - SUBJECTIVE AND OBJECTIVE BOX
HPI:  68F with obesity, afib on Xarelto (but at 5mg dosing), hx of mengingongyFemale  with  PAST MEDICAL & SURGICAL HISTORY:  Hypertension      Depression      Tinea versicolor      Hemangioma      H/O colonoscopy      Pre-diabetes      Meningioma      Atrial fibrillation      RUT (obstructive sleep apnea)      Class 3 severe obesity with body mass index (BMI) of 40.0 to 44.9 in adult      Uterine disorder  ablation 2005      S/P cholecystectomy  2011      S/P knee surgery  right knee meniscus repair 2010      S/P laminectomy  2000      H/O laparoscopic adjustable gastric banding      S/P arthroscopy of right knee      S/P resection of meningioma          REVIEW OF SYSTEMS:  CONSTITUTIONAL:    no fever or weight loss or fatigue  EYES:    no eye pain or visual disturbances or discharge  ENMT:     no difficulty hearing or tinnitus or vertigo, no sinus or throat pain  NECK:    no pain or stiffness  RESPIRATORY:    no cough or wheezing or chills or hemoptysis, no shortness of breath  CARDIOVASCULAR:    no chest pain or palpitations or dizziness or leg swelling  GASTROINTESTINAL:    no abdominal or epigastric pain. no nausea or vomiting or hematemesis, no diarrhea or constipation. no melena or hematochezia.  GENITOURINARY:    no dysuria or frequency or hematuria or incontinence  NEUROLOGICAL:    no headaches or memory loss or loss of strength or numbness or tremors  SKIN:    no itching or burning or rashes or lesions   LYMPH NODES:    no enlarged glands  ENDOCRINE:    no heat or cold intolerance, no hair loss, no polydipsia or polyuria  MUSCULOSKELETAL:    no joint pain or swelling, no muscle or back or extremity pain  PSYCHIATRIC:    no depression or anxiety or mood swings or difficulty sleeping  HEME/LYMPH:    no easy bruising or bleeding gums  ALLERGY AND IMMUNOLOGIC:    no hives or eczema      HOME MEDICATIONS:    ****INSERT HOME MEDS ****    ALLERGIES and INTOLERANCES:  No Known Allergies        SOCIAL HISTORY:  ****INSERT SOCIAL HISTORY *****    FAMILY HISTORY:  Family history of diabetes mellitus in mother (Mother)    Family history of coronary artery disease in mother (Mother)    FHx: heart disease (Sibling)    Family history of diabetes mellitus in brother (Sibling)    Family history of stroke or transient ischemic attack in sister (Sibling)    History of hypertension in sibling (Sibling)            PHYSICAL EXAM:  Vital Signs Last 24 Hrs  T(C): 36.7 (17 Jul 2024 12:40), Max: 36.8 (17 Jul 2024 06:35)  T(F): 98 (17 Jul 2024 12:40), Max: 98.3 (17 Jul 2024 06:35)  HR: 77 (17 Jul 2024 12:40) (68 - 82)  BP: 138/87 (17 Jul 2024 12:40) (94/64 - 138/87)  BP(mean): --  RR: 20 (17 Jul 2024 12:40) (15 - 26)  SpO2: 100% (17 Jul 2024 12:40) (94% - 100%)    Parameters below as of 17 Jul 2024 12:40  Patient On (Oxygen Delivery Method): nasal cannula  O2 Flow (L/min): 2      GENERAL:     age appropriate, in NAD  HEAD:     atraumatic, normocephalic  EYES:     EOMI, conjunctiva and sclera clear  RESPIRATORY:     clear to auscultation bilaterally, no rales or rhonchi or wheezing or rubs  CARDIOVASCULAR:     regular rate and rhythm, no murmurs or rubs or gallops  GASTROINTESTINAL:     soft, nontender, nondistended, bowel sounds present  EXTREMITIES:     no clubbing or cyanosis or edema  MUSCULOSKELETAL:     no joint pain or swelling or deformities  NERVOUS SYSTEM:     motor strength intact with 5/5 B/L upper and lower extremities, no gross sensory deficits  PSYCH:     appropriate, alert and orientated x3, good concentration      LABS:                        14.0   5.92  )-----------( 194      ( 01 Jul 2024 09:19 )             42.8     01 Jul 2024 09:19    140    |  105    |  17     ----------------------------<  160<H>  4.5     |  30     |  0.83         Ca    9.2        01 Jul 2024 09:19    TPro  7.2    /  Alb  3.8    /  TBili  0.4    /  DBili  x      /  AST  15     /  ALT  27     /  AlkPhos  54     01 Jul 2024 09:19    LIVER FUNCTIONS - ( 01 Jul 2024 09:19 )  Alb: 3.8 g/dL / Pro: 7.2 g/dL / ALK PHOS: 54 U/L / ALT: 27 U/L / AST: 15 U/L / GGT: x                    HPI:  68F with obesity, afib on Xarelto (but at 5mg dosing), hx of meningioma       PAST MEDICAL & SURGICAL HISTORY:  Hypertension      Depression      Tinea versicolor      Hemangioma      H/O colonoscopy      Pre-diabetes      Meningioma      Atrial fibrillation      RUT (obstructive sleep apnea)      Class 3 severe obesity with body mass index (BMI) of 40.0 to 44.9 in adult      Uterine disorder  ablation 2005      S/P cholecystectomy  2011      S/P knee surgery  right knee meniscus repair 2010      S/P laminectomy  2000      H/O laparoscopic adjustable gastric banding      S/P arthroscopy of right knee      S/P resection of meningioma          REVIEW OF SYSTEMS:  CONSTITUTIONAL:    no fever or weight loss or fatigue  EYES:    no eye pain or visual disturbances or discharge  ENMT:     no difficulty hearing or tinnitus or vertigo, no sinus or throat pain  NECK:    no pain or stiffness  RESPIRATORY:    no cough or wheezing or chills or hemoptysis, no shortness of breath  CARDIOVASCULAR:    no chest pain or palpitations or dizziness or leg swelling  GASTROINTESTINAL:    no abdominal or epigastric pain. no nausea or vomiting or hematemesis, no diarrhea or constipation. no melena or hematochezia.  GENITOURINARY:    no dysuria or frequency or hematuria or incontinence  NEUROLOGICAL:    no headaches or memory loss or loss of strength or numbness or tremors  SKIN:    no itching or burning or rashes or lesions   LYMPH NODES:    no enlarged glands  ENDOCRINE:    no heat or cold intolerance, no hair loss, no polydipsia or polyuria  MUSCULOSKELETAL:    no joint pain or swelling, no muscle or back or extremity pain  PSYCHIATRIC:    no depression or anxiety or mood swings or difficulty sleeping  HEME/LYMPH:    no easy bruising or bleeding gums  ALLERGY AND IMMUNOLOGIC:    no hives or eczema      HOME MEDICATIONS:    ****INSERT HOME MEDS ****    ALLERGIES and INTOLERANCES:  No Known Allergies        SOCIAL HISTORY:  ****INSERT SOCIAL HISTORY *****    FAMILY HISTORY:  Family history of diabetes mellitus in mother (Mother)    Family history of coronary artery disease in mother (Mother)    FHx: heart disease (Sibling)    Family history of diabetes mellitus in brother (Sibling)    Family history of stroke or transient ischemic attack in sister (Sibling)    History of hypertension in sibling (Sibling)            PHYSICAL EXAM:  Vital Signs Last 24 Hrs  T(C): 36.7 (17 Jul 2024 12:40), Max: 36.8 (17 Jul 2024 06:35)  T(F): 98 (17 Jul 2024 12:40), Max: 98.3 (17 Jul 2024 06:35)  HR: 77 (17 Jul 2024 12:40) (68 - 82)  BP: 138/87 (17 Jul 2024 12:40) (94/64 - 138/87)  BP(mean): --  RR: 20 (17 Jul 2024 12:40) (15 - 26)  SpO2: 100% (17 Jul 2024 12:40) (94% - 100%)    Parameters below as of 17 Jul 2024 12:40  Patient On (Oxygen Delivery Method): nasal cannula  O2 Flow (L/min): 2      GENERAL:     age appropriate, in NAD  HEAD:     atraumatic, normocephalic  EYES:     EOMI, conjunctiva and sclera clear  RESPIRATORY:     clear to auscultation bilaterally, no rales or rhonchi or wheezing or rubs  CARDIOVASCULAR:     regular rate and rhythm, no murmurs or rubs or gallops  GASTROINTESTINAL:     soft, nontender, nondistended, bowel sounds present  EXTREMITIES:     no clubbing or cyanosis or edema  MUSCULOSKELETAL:     no joint pain or swelling or deformities  NERVOUS SYSTEM:     motor strength intact with 5/5 B/L upper and lower extremities, no gross sensory deficits  PSYCH:     appropriate, alert and orientated x3, good concentration      LABS:                        14.0   5.92  )-----------( 194      ( 01 Jul 2024 09:19 )             42.8     01 Jul 2024 09:19    140    |  105    |  17     ----------------------------<  160<H>  4.5     |  30     |  0.83         Ca    9.2        01 Jul 2024 09:19    TPro  7.2    /  Alb  3.8    /  TBili  0.4    /  DBili  x      /  AST  15     /  ALT  27     /  AlkPhos  54     01 Jul 2024 09:19    LIVER FUNCTIONS - ( 01 Jul 2024 09:19 )  Alb: 3.8 g/dL / Pro: 7.2 g/dL / ALK PHOS: 54 U/L / ALT: 27 U/L / AST: 15 U/L / GGT: x                    HPI:  68F with obesity, afib on Xarelto, hx of meningioma with resection, depression, mild RUT but not on CPAP, here for lap band removal and lap sleeve gastrectomy.  Has been having dysphagia in the past couple of months and found to have the a lap band issue.  Recommended for removal and for a lap sleeve gastrectomy.  Patient seen in PACU.  Currently still groggy from anesthesia but denies any nausea or pain.          PAST MEDICAL & SURGICAL HISTORY:  Hypertension  Depression  Tinea versicolor  Hemangioma  H/O colonoscopy  Pre-diabetes  Meningioma  Atrial fibrillation  RUT (obstructive sleep apnea)  Class 3 severe obesity with body mass index (BMI) of 40.0 to 44.9 in adult  Uterine disorder ablation 2005  S/P cholecystectomy 2011  S/P knee surgery right knee meniscus repair 2010  S/P laminectomy 2000  H/O laparoscopic adjustable gastric banding  S/P arthroscopy of right knee  S/P resection of meningioma      REVIEW OF SYSTEMS:  CONSTITUTIONAL:    no fever or weight loss or fatigue  EYES:    no eye pain or visual disturbances or discharge  ENMT:     no difficulty hearing or tinnitus or vertigo, no sinus or throat pain  NECK:    no pain or stiffness  RESPIRATORY:    no cough or wheezing or chills or hemoptysis, no shortness of breath  CARDIOVASCULAR:    no chest pain or palpitations or dizziness or leg swelling  GASTROINTESTINAL:    no abdominal or epigastric pain. no nausea or vomiting or hematemesis, no diarrhea or constipation. no melena or hematochezia.  GENITOURINARY:    no dysuria or frequency or hematuria or incontinence  NEUROLOGICAL:    no headaches or memory loss or loss of strength or numbness or tremors  SKIN:    no itching or burning or rashes or lesions   LYMPH NODES:    no enlarged glands  ENDOCRINE:    no heat or cold intolerance, no hair loss, no polydipsia or polyuria  MUSCULOSKELETAL:    no joint pain or swelling, no muscle or back or extremity pain  PSYCHIATRIC:    no depression or anxiety or mood swings or difficulty sleeping  HEME/LYMPH:    no easy bruising or bleeding gums  ALLERGY AND IMMUNOLOGIC:    no hives or eczema      HOME MEDICATIONS:    · 	Toprol- mg oral tablet, extended release: Last Dose Taken:  , 1 tab(s) orally once a day  · 	venlafaxine 225 mg oral tablet, extended release: Last Dose Taken:  , 1 tab(s) orally  · 	Crestor 20 mg oral tablet: Last Dose Taken:  , 1 tab(s) orally once a day  · 	Xarelto 20mg (NOT 2.5 mg) oral tablet: Last Dose Taken:  , 1 tab(s) orally      ALLERGIES and INTOLERANCES:  No Known Allergies      SOCIAL HISTORY:  - drinks wine daily   - no smoking    FAMILY HISTORY:  Family history of diabetes mellitus in mother (Mother)  Family history of coronary artery disease in mother (Mother)  FHx: heart disease (Sibling)  Family history of diabetes mellitus in brother (Sibling)  Family history of stroke or transient ischemic attack in sister (Sibling)  History of hypertension in sibling (Sibling)    PHYSICAL EXAM:  Vital Signs Last 24 Hrs  T(C): 36.7 (17 Jul 2024 12:40), Max: 36.8 (17 Jul 2024 06:35)  T(F): 98 (17 Jul 2024 12:40), Max: 98.3 (17 Jul 2024 06:35)  HR: 77 (17 Jul 2024 12:40) (68 - 82)  BP: 138/87 (17 Jul 2024 12:40) (94/64 - 138/87)  BP(mean): --  RR: 20 (17 Jul 2024 12:40) (15 - 26)  SpO2: 100% (17 Jul 2024 12:40) (94% - 100%)    Parameters below as of 17 Jul 2024 12:40  Patient On (Oxygen Delivery Method): nasal cannula  O2 Flow (L/min): 2    GENERAL:     obese female in NAD   HEAD:     atraumatic, normocephalic  EYES:     EOMI, conjunctiva and sclera clear  RESPIRATORY:     clear to auscultation bilaterally, no rales or rhonchi or wheezing or rubs  CARDIOVASCULAR:     regular rate and rhythm, no murmurs or rubs or gallops  GASTROINTESTINAL:     soft, nontender, distended, bowel sounds present  EXTREMITIES:     no clubbing or cyanosis or edema  MUSCULOSKELETAL:     no joint pain or swelling or deformities  NERVOUS SYSTEM:     can move all extremities  PSYCH:     appropriate, alert and orientated x3, good concentration      LABS:                        14.0   5.92  )-----------( 194      ( 01 Jul 2024 09:19 )             42.8     01 Jul 2024 09:19    140    |  105    |  17     ----------------------------<  160<H>  4.5     |  30     |  0.83         Ca    9.2        01 Jul 2024 09:19    TPro  7.2    /  Alb  3.8    /  TBili  0.4    /  DBili  x      /  AST  15     /  ALT  27     /  AlkPhos  54     01 Jul 2024 09:19    LIVER FUNCTIONS - ( 01 Jul 2024 09:19 )  Alb: 3.8 g/dL / Pro: 7.2 g/dL / ALK PHOS: 54 U/L / ALT: 27 U/L / AST: 15 U/L / GGT: x                    HPI:  68F with obesity, afib on Xarelto, hx of meningioma with resection, depression, mild RUT but not on CPAP, here for lap band removal and lap sleeve gastrectomy.  Has been having dysphagia in the past couple of months and found to have the a lap band issue.  Recommended for removal and for a lap sleeve gastrectomy.  Patient seen in PACU.  Currently still groggy from anesthesia but denies any nausea or pain.          PAST MEDICAL & SURGICAL HISTORY:  Hypertension  Depression  Tinea versicolor  Hemangioma  H/O colonoscopy  Pre-diabetes  Meningioma  Atrial fibrillation  RUT (obstructive sleep apnea)  Class 3 severe obesity with body mass index (BMI) of 40.0 to 44.9 in adult  Uterine disorder ablation 2005  S/P cholecystectomy 2011  S/P knee surgery right knee meniscus repair 2010  S/P laminectomy 2000  H/O laparoscopic adjustable gastric banding  S/P arthroscopy of right knee  S/P resection of meningioma      REVIEW OF SYSTEMS:  CONSTITUTIONAL:    no fever or weight loss or fatigue  EYES:    no eye pain or visual disturbances or discharge  ENMT:     no difficulty hearing or tinnitus or vertigo, no sinus or throat pain  NECK:    no pain or stiffness  RESPIRATORY:    no cough or wheezing or chills or hemoptysis, no shortness of breath  CARDIOVASCULAR:    no chest pain or palpitations or dizziness or leg swelling  GASTROINTESTINAL:    no abdominal or epigastric pain. no nausea or vomiting or hematemesis, no diarrhea or constipation. no melena or hematochezia.  GENITOURINARY:    no dysuria or frequency or hematuria or incontinence  NEUROLOGICAL:    no headaches or memory loss or loss of strength or numbness or tremors  SKIN:    no itching or burning or rashes or lesions   LYMPH NODES:    no enlarged glands  ENDOCRINE:    no heat or cold intolerance, no hair loss, no polydipsia or polyuria  MUSCULOSKELETAL:    no joint pain or swelling, no muscle or back or extremity pain  PSYCHIATRIC:    no depression or anxiety or mood swings or difficulty sleeping  HEME/LYMPH:    no easy bruising or bleeding gums  ALLERGY AND IMMUNOLOGIC:    no hives or eczema      HOME MEDICATIONS:    · 	Toprol- mg oral tablet, extended release: Last Dose Taken:  , 1 tab(s) orally once a day  · 	venlafaxine 225 mg oral tablet, extended release: Last Dose Taken:  , 1 tab(s) orally  · 	Crestor 20 mg oral tablet: Last Dose Taken:  , 1 tab(s) orally once a day  · 	Xarelto 20mg (NOT 2.5 mg) oral tablet: Last Dose Taken:  , 1 tab(s) orally      ALLERGIES and INTOLERANCES:  No Known Allergies      SOCIAL HISTORY:  - drinks wine daily   - no smoking    FAMILY HISTORY:  Family history of diabetes mellitus in mother (Mother)  Family history of coronary artery disease in mother (Mother)  FHx: heart disease (Sibling)  Family history of diabetes mellitus in brother (Sibling)  Family history of stroke or transient ischemic attack in sister (Sibling)  History of hypertension in sibling (Sibling)    PHYSICAL EXAM:  Vital Signs Last 24 Hrs  T(C): 36.7 (17 Jul 2024 12:40), Max: 36.8 (17 Jul 2024 06:35)  T(F): 98 (17 Jul 2024 12:40), Max: 98.3 (17 Jul 2024 06:35)  HR: 77 (17 Jul 2024 12:40) (68 - 82)  BP: 138/87 (17 Jul 2024 12:40) (94/64 - 138/87)  BP(mean): --  RR: 20 (17 Jul 2024 12:40) (15 - 26)  SpO2: 100% (17 Jul 2024 12:40) (94% - 100%)    Parameters below as of 17 Jul 2024 12:40  Patient On (Oxygen Delivery Method): nasal cannula  O2 Flow (L/min): 2    GENERAL:     obese female in NAD   HEAD:     atraumatic, normocephalic  EYES:     EOMI, conjunctiva and sclera clear  RESPIRATORY:     clear to auscultation bilaterally, no rales or rhonchi or wheezing or rubs  CARDIOVASCULAR:     regular rate and rhythm, no murmurs or rubs or gallops  GASTROINTESTINAL:     soft, nontender, distended, bowel sounds present  EXTREMITIES:     no clubbing or cyanosis or edema  MUSCULOSKELETAL:     no joint pain or swelling or deformities  NERVOUS SYSTEM:     can move all extremities  PSYCH:     appropriate, alert and orientated x3, good concentration      LABS:                        14.0   5.92  )-----------( 194      ( 01 Jul 2024 09:19 )             42.8     01 Jul 2024 09:19    140    |  105    |  17     ----------------------------<  160<H>  4.5     |  30     |  0.83         Ca    9.2        01 Jul 2024 09:19    TPro  7.2    /  Alb  3.8    /  TBili  0.4    /  DBili  x      /  AST  15     /  ALT  27     /  AlkPhos  54     01 Jul 2024 09:19    LIVER FUNCTIONS - ( 01 Jul 2024 09:19 )  Alb: 3.8 g/dL / Pro: 7.2 g/dL / ALK PHOS: 54 U/L / ALT: 27 U/L / AST: 15 U/L / GGT: x             Pre-op EKG:  afib

## 2024-07-17 NOTE — DISCHARGE NOTE PROVIDER - CARE PROVIDERS DIRECT ADDRESSES
Goals: 1. Continue decreasing sweet/carbs and portions following the diet guidelines in the nutrition folder/booklet   2. Continue current exercise routine of walking 3 days a week for 30-60 minutes increasing to 3-5 days a week for 30-60 minutes   3.  Remember for after surgery: just take your multivitamin once in the morning and once in the evening ,[email or web address]

## 2024-07-17 NOTE — ASU PREOP CHECKLIST - BLOOD AVAILABLE
n/a VTAMA Counseling: I discussed with the patient that VTAMA is not for use in the eyes, mouth or mouth. They should call the office if they develop any signs of allergic reactions to VTAMA. The patient verbalized understanding of the proper use and possible adverse effects of VTAMA.  All of the patient's questions and concerns were addressed.

## 2024-07-17 NOTE — DISCHARGE NOTE PROVIDER - NSDCMRMEDTOKEN_GEN_ALL_CORE_FT
Crestor 20 mg oral tablet: 1 tab(s) orally once a day  Toprol- mg oral tablet, extended release: 1 tab(s) orally once a day  venlafaxine 225 mg oral tablet, extended release: 1 tab(s) orally  Xarelto 2.5 mg oral tablet: 1 tab(s) orally   Bariatric Fusion Mixed Berry Complete oral tablet, chewable: 2 tab(s) orally 2 times a day Start 1 week after post op visit as instructed  Crestor 20 mg oral tablet: 1 tab(s) orally once a day  omeprazole 20 mg oral delayed release capsule: 1 cap(s) orally once a day Open capsule and put in low fat yogurt, pudding or applesauce  ondansetron 4 mg oral tablet, disintegratin tab(s) orally 3 times a day as needed for  nausea Take as needed for nausea  oxyCODONE 5 mg oral tablet: 1 tab(s) orally every 6 hours as needed for  moderate/severe pain If pill is small enough, swallow whole, if bigger than a tictac then cut in half and take both halves in yogurt, pudding or applesauce  Toprol- mg oral tablet, extended release: 1 tab(s) orally once a day  venlafaxine 225 mg oral tablet, extended release: 1 tab(s) orally  Xarelto 2.5 mg oral tablet: 1 tab(s) orally

## 2024-07-17 NOTE — DISCHARGE NOTE PROVIDER - NSDCCPTREATMENT_GEN_ALL_CORE_FT
PRINCIPAL PROCEDURE  Procedure: Laparoscopic sleeve gastrectomy with laparoscopic repair of hiatal hernia  Findings and Treatment: Laparoscopic staged conversion removal of lap band, port, tubing with takedown of gastric plication, Lap gastric sleeve with take down of gastric plication. Intra op EGD and laparoscopic repair of hiatal hernia

## 2024-07-17 NOTE — BRIEF OPERATIVE NOTE - NSICDXBRIEFPROCEDURE_GEN_ALL_CORE_FT
PROCEDURES:  Laparoscopic sleeve gastrectomy with laparoscopic repair of hiatal hernia 17-Jul-2024 10:37:51 Laparoscopic staged conversion removal of lap band, port, tubing with takedown of gastric plication, Lap gastric sleeve with take down of gastric plication. Intra op EGD and laparoscopic repair of hiatal hernia Della Springer  Laparoscopic removal of gastric band and port 17-Jul-2024 10:38:18  Della Springer

## 2024-07-17 NOTE — CONSULT NOTE ADULT - ASSESSMENT
68F with obesity, afib on Xarelto, hx of meningioma with resection, depression, mild RUT but not on CPAP, here for lap band removal and lap sleeve gastrectomy.    Lap band removal and lap sleeve gastrectomy  - post-op care as per bariatrics  - adv diet as per bariatrics  - anti-emetics  - pain control  - incentive spirometer    Afib - [id number] of 3  - no hx of strokes and just had surgery - would prefer to not bridge with therapeutic lovenox at this time  - however, would continue anticoagulation once can tolerate PO  - may need to switch to Eliquis given recent bariatric surgery    HTN  - cont with lopressor (IV while NPO)  - restart statin when able to take PO    Depression  - restart venlafaxine when able to take PO    Preventive measures  - for now, use lovenox ppx dosing for DVT ppx

## 2024-07-17 NOTE — BRIEF OPERATIVE NOTE - NSICDXBRIEFPOSTOP_GEN_ALL_CORE_FT
POST-OP DIAGNOSIS:  Morbid obesity due to excess calories 17-Jul-2024 10:48:07   Mechanical port failure. Hiatal hernia Della Springer

## 2024-07-17 NOTE — PATIENT PROFILE ADULT - FUNCTIONAL ASSESSMENT - DAILY ACTIVITY 5.
Addended by: HARSHIL DAY on: 9/28/2018 02:54 PM     Modules accepted: Orders    
4 = No assist / stand by assistance

## 2024-07-17 NOTE — DISCHARGE NOTE PROVIDER - NSDCFUSCHEDAPPT_GEN_ALL_CORE_FT
Jarred Kowalski  [facility]  BARIATRIC [address]  Scheduled Appointment: 07/25/2024    Jarred Kowalski  [facility]  BARIATRIC [address]  Scheduled Appointment: 08/22/2024    [facility]  WEIGHTMGMT [address]  Scheduled Appointment: 09/11/2024    [facility]  MRI  [address]  Scheduled Appointment: 10/09/2024    Filemon Gonzales  [facility]  NEUROSURG [address]  Scheduled Appointment: 10/09/2024

## 2024-07-17 NOTE — PATIENT PROFILE ADULT - NSFALLSECTIONLABEL_GEN_A_CORE
Received call from Arie Bustamante at [facility] with questions about patient's MBI order. Informed her someone will call to discuss. Contacted CIARA Hwang at [facility], asked her to contact Liyah Metcalf at [phone]. Ester Medrano  RN, ADN, BSE, OCN  Patient Nurse Navigator
.

## 2024-07-18 ENCOUNTER — TRANSCRIPTION ENCOUNTER (OUTPATIENT)
Age: 69
End: 2024-07-18

## 2024-07-18 VITALS — HEART RATE: 74 BPM | DIASTOLIC BLOOD PRESSURE: 78 MMHG | SYSTOLIC BLOOD PRESSURE: 139 MMHG

## 2024-07-18 LAB
ANION GAP SERPL CALC-SCNC: 10 MMOL/L — SIGNIFICANT CHANGE UP (ref 5–17)
BUN SERPL-MCNC: 11 MG/DL — SIGNIFICANT CHANGE UP (ref 7–23)
CALCIUM SERPL-MCNC: 8.7 MG/DL — SIGNIFICANT CHANGE UP (ref 8.4–10.5)
CHLORIDE SERPL-SCNC: 105 MMOL/L — SIGNIFICANT CHANGE UP (ref 96–108)
CO2 SERPL-SCNC: 25 MMOL/L — SIGNIFICANT CHANGE UP (ref 22–31)
CREAT SERPL-MCNC: 0.71 MG/DL — SIGNIFICANT CHANGE UP (ref 0.5–1.3)
EGFR: 94 ML/MIN/1.73M2 — SIGNIFICANT CHANGE UP
GLUCOSE SERPL-MCNC: 143 MG/DL — HIGH (ref 70–99)
HCT VFR BLD CALC: 35.2 % — SIGNIFICANT CHANGE UP (ref 34.5–45)
HGB BLD-MCNC: 12.2 G/DL — SIGNIFICANT CHANGE UP (ref 11.5–15.5)
MCHC RBC-ENTMCNC: 30.1 PG — SIGNIFICANT CHANGE UP (ref 27–34)
MCHC RBC-ENTMCNC: 34.7 GM/DL — SIGNIFICANT CHANGE UP (ref 32–36)
MCV RBC AUTO: 86.9 FL — SIGNIFICANT CHANGE UP (ref 80–100)
NRBC # BLD: 0 /100 WBCS — SIGNIFICANT CHANGE UP (ref 0–0)
PLATELET # BLD AUTO: 141 K/UL — LOW (ref 150–400)
POTASSIUM SERPL-MCNC: 4.2 MMOL/L — SIGNIFICANT CHANGE UP (ref 3.5–5.3)
POTASSIUM SERPL-SCNC: 4.2 MMOL/L — SIGNIFICANT CHANGE UP (ref 3.5–5.3)
RBC # BLD: 4.05 M/UL — SIGNIFICANT CHANGE UP (ref 3.8–5.2)
RBC # FLD: 12.7 % — SIGNIFICANT CHANGE UP (ref 10.3–14.5)
SODIUM SERPL-SCNC: 140 MMOL/L — SIGNIFICANT CHANGE UP (ref 135–145)
WBC # BLD: 7.83 K/UL — SIGNIFICANT CHANGE UP (ref 3.8–10.5)
WBC # FLD AUTO: 7.83 K/UL — SIGNIFICANT CHANGE UP (ref 3.8–10.5)

## 2024-07-18 PROCEDURE — 88307 TISSUE EXAM BY PATHOLOGIST: CPT

## 2024-07-18 PROCEDURE — 36415 COLL VENOUS BLD VENIPUNCTURE: CPT

## 2024-07-18 PROCEDURE — 99232 SBSQ HOSP IP/OBS MODERATE 35: CPT

## 2024-07-18 PROCEDURE — 80048 BASIC METABOLIC PNL TOTAL CA: CPT

## 2024-07-18 PROCEDURE — C9399: CPT

## 2024-07-18 PROCEDURE — 88300 SURGICAL PATH GROSS: CPT

## 2024-07-18 PROCEDURE — 93005 ELECTROCARDIOGRAM TRACING: CPT

## 2024-07-18 PROCEDURE — 85027 COMPLETE CBC AUTOMATED: CPT

## 2024-07-18 PROCEDURE — 94664 DEMO&/EVAL PT USE INHALER: CPT

## 2024-07-18 PROCEDURE — C1889: CPT

## 2024-07-18 RX ORDER — DEXTROSE MONOHYDRATE AND SODIUM CHLORIDE 5; .3 G/100ML; G/100ML
1000 INJECTION, SOLUTION INTRAVENOUS
Refills: 0 | Status: DISCONTINUED | OUTPATIENT
Start: 2024-07-18 | End: 2024-07-18

## 2024-07-18 RX ORDER — ACETAMINOPHEN 325 MG
1000 TABLET ORAL EVERY 6 HOURS
Refills: 0 | Status: DISCONTINUED | OUTPATIENT
Start: 2024-07-18 | End: 2024-07-18

## 2024-07-18 RX ADMIN — ONDANSETRON HYDROCHLORIDE 4 MILLIGRAM(S): 2 INJECTION INTRAMUSCULAR; INTRAVENOUS at 00:06

## 2024-07-18 RX ADMIN — ENOXAPARIN SODIUM 40 MILLIGRAM(S): 100 INJECTION SUBCUTANEOUS at 06:46

## 2024-07-18 RX ADMIN — Medication 5 MILLIGRAM(S): at 09:56

## 2024-07-18 RX ADMIN — ONDANSETRON HYDROCHLORIDE 4 MILLIGRAM(S): 2 INJECTION INTRAMUSCULAR; INTRAVENOUS at 06:05

## 2024-07-18 RX ADMIN — Medication 1000 MILLIGRAM(S): at 12:34

## 2024-07-18 RX ADMIN — ONDANSETRON HYDROCHLORIDE 4 MILLIGRAM(S): 2 INJECTION INTRAMUSCULAR; INTRAVENOUS at 13:45

## 2024-07-18 RX ADMIN — Medication 400 MILLIGRAM(S): at 12:34

## 2024-07-18 RX ADMIN — Medication 1000 MILLIGRAM(S): at 03:30

## 2024-07-18 RX ADMIN — ATORVASTATIN CALCIUM 80 MILLIGRAM(S): 20 TABLET, FILM COATED ORAL at 13:42

## 2024-07-18 RX ADMIN — PANTOPRAZOLE SODIUM 40 MILLIGRAM(S): 40 INJECTION, POWDER, FOR SOLUTION INTRAVENOUS at 13:42

## 2024-07-18 RX ADMIN — Medication 5 MILLIGRAM(S): at 02:38

## 2024-07-18 RX ADMIN — VENLAFAXINE 225 MILLIGRAM(S): 37.5 CAPSULE, EXTENDED RELEASE ORAL at 13:42

## 2024-07-18 RX ADMIN — Medication 5 MILLIGRAM(S): at 14:40

## 2024-07-18 RX ADMIN — Medication 400 MILLIGRAM(S): at 03:02

## 2024-07-18 NOTE — CARE COORDINATION ASSESSMENT. - NSPASTMEDSURGHISTORY_GEN_ALL_CORE_FT
PAST MEDICAL & SURGICAL HISTORY:  Hemangioma      Tinea versicolor      Depression      Hypertension      S/P laminectomy  2000      S/P knee surgery  right knee meniscus repair 2010      S/P cholecystectomy  2011      Uterine disorder  ablation 2005      Meningioma      Pre-diabetes      H/O colonoscopy      H/O laparoscopic adjustable gastric banding      Class 3 severe obesity with body mass index (BMI) of 40.0 to 44.9 in adult      RUT (obstructive sleep apnea)      Atrial fibrillation      S/P resection of meningioma      S/P arthroscopy of right knee

## 2024-07-18 NOTE — CARE COORDINATION ASSESSMENT. - NSADDITIONAL INFORMATION_FT
RN CCM met with pt. at bedside, pt. A&O x4; CM role and DC planning process explained; verbalized understanding.   Pt. reports;  independent in ambulation, stairs, ADLs/ iADLs; strong family support at home;     TRANSITION OF CARE PLAN:  Patient is self-directing own DC planning; DC to home; self-care;   Pt. to f/u with MD post DC;  spouse Vickey to transport at DC;   NO DME NEEDS; No skilled needs identified at home; patient verbalized agreement and understanding;

## 2024-07-18 NOTE — PROGRESS NOTE ADULT - SUBJECTIVE AND OBJECTIVE BOX
POD # 1 s/p Single stage revision of Lap Band (+plication) to sleeve Gastrectomy with repair of hiatal hernia.    Vital Signs Last 24 Hrs  T(C): 36.7 (18 Jul 2024 08:12), Max: 36.7 (17 Jul 2024 12:40)  T(F): 98.1 (18 Jul 2024 08:12), Max: 98.1 (18 Jul 2024 08:12)  HR: 87 (18 Jul 2024 08:12) (68 - 88)  BP: 126/80 (18 Jul 2024 08:12) (94/64 - 141/88)  BP(mean): --  RR: 20 (18 Jul 2024 08:12) (16 - 26)  SpO2: 94% (18 Jul 2024 08:12) (94% - 100%)    Parameters below as of 18 Jul 2024 08:12  Patient On (Oxygen Delivery Method): room air        07-17 @ 07:01  -  07-18 @ 07:00  --------------------------------------------------------  IN: 1600 mL / OUT: 1300 mL / NET: 300 mL                              12.2   7.83  )-----------( 141      ( 18 Jul 2024 06:00 )             35.2       07-18    140  |  105  |  11  ----------------------------<  143<H>  4.2   |  25  |  0.71    Ca    8.7      18 Jul 2024 06:00        Physical Exam:  Awake alert and oriented x3 in no acute distress. NCAT, PERRLA, EOMI  Lungs clear bilaterally without wheezes rhonchi or rales.  Regular rate and rhythm  Abdomen soft, nontender, nondistended, positive bowel sounds in all 4 quadrants. No evidence of hernia or masses. Surgical incisions remained well approximated and healing appropriately without erythema, induration or fluctuance. Dressings remained clean dry and intact  Extremities without edema or tenderness.
Patient is a 68y old  Female who presents with a chief complaint of lap band removal and lap sleeve gastrectomy (17 Jul 2024 12:59)    INTERVAL HPI/OVERNIGHT EVENTS:  No acute events overnight.  This AM, patient overall doing "fine".  Has some abdominal discomfort when attempting to eat (not exquisite pain).  Also has some discomfort when attempting to move.  No nausea.  Otherwise has no other complaints.      MEDICATIONS  (STANDING):  atorvastatin 80 milliGRAM(s) Oral daily  enoxaparin Injectable 40 milliGRAM(s) SubCutaneous every 24 hours  lactated ringers. 1000 milliLiter(s) (75 mL/Hr) IV Continuous <Continuous>  metoprolol tartrate Injectable 5 milliGRAM(s) IV Push every 6 hours  ondansetron Injectable 4 milliGRAM(s) IV Push every 6 hours  pantoprazole  Injectable 40 milliGRAM(s) IV Push daily  venlafaxine XR. 225 milliGRAM(s) Oral daily    MEDICATIONS  (PRN):  acetaminophen   IVPB .. 1000 milliGRAM(s) IV Intermittent every 6 hours PRN Mild Pain (1 - 3)  HYDROmorphone  Injectable 0.5 milliGRAM(s) IV Push every 4 hours PRN Severe Pain (7 - 10)  hyoscyamine SL 0.125 milliGRAM(s) SubLingual every 6 hours PRN nausea and/or vomiting  ibuprofen IVPB .. 800 milliGRAM(s) IV Intermittent every 6 hours PRN Moderate Pain (4 - 6)  oxyCODONE    IR 5 milliGRAM(s) Oral every 6 hours PRN Severe Pain (7 - 10)  simethicone 80 milliGRAM(s) Chew every 6 hours PRN Gas      Allergies  No Known Allergies    ROS as above and reviewed and is otherwise negative      PHYSICAL EXAM:  Vital Signs Last 24 Hrs  T(C): 36.7 (18 Jul 2024 08:12), Max: 36.7 (17 Jul 2024 12:40)  T(F): 98.1 (18 Jul 2024 08:12), Max: 98.1 (18 Jul 2024 08:12)  HR: 81 (18 Jul 2024 10:00) (68 - 88)  BP: 122/76 (18 Jul 2024 10:00) (94/64 - 141/88)  BP(mean): --  RR: 20 (18 Jul 2024 08:12) (16 - 26)  SpO2: 94% (18 Jul 2024 08:12) (94% - 100%)    Parameters below as of 18 Jul 2024 08:12  Patient On (Oxygen Delivery Method): room air      GENERAL:     obese female in NAD   HEAD:     atraumatic, normocephalic  EYES:     EOMI, conjunctiva and sclera clear  RESPIRATORY:     clear to auscultation bilaterally, no rales or rhonchi or wheezing or rubs  CARDIOVASCULAR:     regular rate and rhythm, no murmurs or rubs or gallops  GASTROINTESTINAL:     soft, nontender, less distended today, bowel sounds present  EXTREMITIES:     no clubbing or cyanosis or edema  MUSCULOSKELETAL:     no joint pain or swelling or deformities  NERVOUS SYSTEM:     can move all extremities  PSYCH:     appropriate, alert and orientated x3, good concentration      LABS:                        12.2   7.83  )-----------( 141      ( 18 Jul 2024 06:00 )             35.2     18 Jul 2024 06:00    140    |  105    |  11     ----------------------------<  143    4.2     |  25     |  0.71     Ca    8.7        18 Jul 2024 06:00        Urinalysis Basic - ( 18 Jul 2024 06:00 )    Color: x / Appearance: x / SG: x / pH: x  Gluc: 143 mg/dL / Ketone: x  / Bili: x / Urobili: x   Blood: x / Protein: x / Nitrite: x   Leuk Esterase: x / RBC: x / WBC x   Sq Epi: x / Non Sq Epi: x / Bacteria: x      CAPILLARY BLOOD GLUCOSE

## 2024-07-18 NOTE — DISCHARGE NOTE NURSING/CASE MANAGEMENT/SOCIAL WORK - PATIENT PORTAL LINK FT
You can access the FollowMyHealth Patient Portal offered by [facility] by registering at the following website: [email or web address]. By joining [employer]’s FollowMyHealth portal, you will also be able to view your health information using other applications (apps) compatible with our system.

## 2024-07-18 NOTE — CHART NOTE - NSCHARTNOTEFT_GEN_A_CORE
Spoke to Dr. Norberto Soto, patient's cardiologist, who recommends keeping the patient on Xarelto for now.
68F with obesity, afib on Xarelto, hx of meningioma with resection, depression, mild RUT but not on CPAP, s/p lap band removal and lap sleeve gastrectomy.   Pt seen and given 5mg IV lopressor, VSS, on remote tele. Tolerated well.   Cont postop care management.

## 2024-07-18 NOTE — CAREGIVER ENGAGEMENT NOTE - CAREGIVER OUTREACH NOTES - FREE TEXT
TRANSITION OF CARE PLAN:  Patient is self-directing own DC planning; DC to home; self-care;   Pt. to f/u with MD post DC;  spouse Vickey to transport at DC;   NO DME NEEDS; No skilled needs identified at home; patient verbalized agreement and understanding;

## 2024-07-18 NOTE — CARE COORDINATION ASSESSMENT. - NSCAREPROVIDERS_GEN_ALL_CORE_FT
CARE PROVIDERS:  Administration: Manuel Schrader  Admitting: Jarred Kowalski  Attending: Jarred Kowalski  Case Management: Santaromana, Anna  Consultant: Noelle Donaldson  Covering Team: Kendall Jones  Infection Control: Radha Weems  Nurse: Manisha Madden  Ordered: Denice Nicholson  Ordered: Physician, Ordering  PCA/Nursing Assistant: Allison Martell  Primary Team: Phyllis Shea  Primary Team: Kaveh Robledo  Primary Team: Shyann Grider  Registered Dietitian: Miguel Ly  Registered Dietitian: Gauri Robledo  Team: [facility], Team  UR// Supp. Assoc.: Mai Ortez

## 2024-07-18 NOTE — PROGRESS NOTE ADULT - ASSESSMENT
68F with obesity, afib on Xarelto, hx of meningioma with resection, depression, mild RUT but not on CPAP, here for lap band removal and lap sleeve gastrectomy.    Lap band removal and lap sleeve gastrectomy  - post-op care as per bariatrics ->"cleared to start her bariatric clear liquid diet (stage I) she will monitor p.o. intake with anticipated discharge home later this afternoon."  - no medical contraindications for discharge  - adv diet as per bariatrics  - anti-emetics  - pain control  - incentive spirometer    Afib - [id number] of 3  - tele this AM with afib with HR in 70-80s  - no hx of strokes and just had surgery - would prefer to not bridge with therapeutic lovenox at this time  - however, would continue anticoagulation once can tolerate PO  - will defer switching to Eliquis to her cardiologist -> in the meantime, cont with Xarelto when able to take PO    HTN  - cont with lopressor (IV while NPO)  - restart statin when able to take PO    Depression  - restart venlafaxine when able to take PO    Preventive measures  - for now, use lovenox ppx dosing for DVT ppx    
Doing well this morning with no complaints.  Has been out of bed and ambulating throughout the night and peeing frequently.  Denies any nausea or vomiting.  Minor incisional tenderness as would be expected.    Abdomen remains soft, nontender, nondistended with positive bowel sounds in all 4 quadrants.  Surgical incisions remain clean and dry.    Patient is cleared to start her bariatric clear liquid diet (stage I) she will monitor p.o. intake with anticipated discharge home later this afternoon.  Dietary progression is again been reviewed.    Postoperative instructions have been provided including avoidance of heavy lifting and strenuous activities in excess of 20-25 pounds for duration of 4-6 weeks. The patient may shower keeping the incisions clean, dry, covered as needed.    Given patient's history of atrial fibrillation, she continues on subcutaneous Lovenox.  She will resume Xarelto so long as she is tolerating her p.o. intake.  First dose tomorrow.

## 2024-07-18 NOTE — DISCHARGE NOTE NURSING/CASE MANAGEMENT/SOCIAL WORK - NSDCPEFALRISK_GEN_ALL_CORE
For information on Fall & Injury Prevention, visit: [email or web address] OR  [email or web address] OR  https://www.cdc.gov/steadi/patient.html

## 2024-07-19 ENCOUNTER — NON-APPOINTMENT (OUTPATIENT)
Age: 69
End: 2024-07-19

## 2024-07-24 ENCOUNTER — RX RENEWAL (OUTPATIENT)
Age: 69
End: 2024-07-24

## 2024-07-25 ENCOUNTER — APPOINTMENT (OUTPATIENT)
Dept: BARIATRICS | Facility: CLINIC | Age: 69
End: 2024-07-25
Payer: MEDICARE

## 2024-07-25 VITALS
DIASTOLIC BLOOD PRESSURE: 85 MMHG | OXYGEN SATURATION: 96 % | SYSTOLIC BLOOD PRESSURE: 139 MMHG | HEART RATE: 103 BPM | TEMPERATURE: 97.4 F

## 2024-07-25 VITALS — BODY MASS INDEX: 37.86 KG/M2 | HEIGHT: 64 IN | WEIGHT: 221.78 LBS

## 2024-07-25 DIAGNOSIS — Z98.84 BARIATRIC SURGERY STATUS: ICD-10-CM

## 2024-07-25 DIAGNOSIS — E46 UNSPECIFIED PROTEIN-CALORIE MALNUTRITION: ICD-10-CM

## 2024-07-25 DIAGNOSIS — E56.9 VITAMIN DEFICIENCY, UNSPECIFIED: ICD-10-CM

## 2024-07-25 DIAGNOSIS — K91.2 POSTSURGICAL MALABSORPTION, NOT ELSEWHERE CLASSIFIED: ICD-10-CM

## 2024-07-25 DIAGNOSIS — Z98.890 OTHER SPECIFIED POSTPROCEDURAL STATES: ICD-10-CM

## 2024-07-25 DIAGNOSIS — E61.8 DEFICIENCY OF OTHER SPECIFIED NUTRIENT ELEMENTS: ICD-10-CM

## 2024-07-25 DIAGNOSIS — Z01.812 ENCOUNTER FOR PREPROCEDURAL LABORATORY EXAMINATION: ICD-10-CM

## 2024-07-25 DIAGNOSIS — R63.2 POLYPHAGIA: ICD-10-CM

## 2024-07-25 DIAGNOSIS — Z90.3 POSTSURGICAL MALABSORPTION, NOT ELSEWHERE CLASSIFIED: ICD-10-CM

## 2024-07-25 DIAGNOSIS — R63.5 ABNORMAL WEIGHT GAIN: ICD-10-CM

## 2024-07-25 DIAGNOSIS — R63.8 OTHER SYMPTOMS AND SIGNS CONCERNING FOOD AND FLUID INTAKE: ICD-10-CM

## 2024-07-25 DIAGNOSIS — Z00.00 ENCOUNTER FOR GENERAL ADULT MEDICAL EXAMINATION W/OUT ABNORMAL FINDINGS: ICD-10-CM

## 2024-07-25 DIAGNOSIS — R79.9 ABNORMAL FINDING OF BLOOD CHEMISTRY, UNSPECIFIED: ICD-10-CM

## 2024-07-25 PROCEDURE — 99024 POSTOP FOLLOW-UP VISIT: CPT

## 2024-07-25 NOTE — REVIEW OF SYSTEMS
[Patient Intake Form Reviewed] : Patient intake form was reviewed [Negative] : Allergic/Immunologic [Constipation] : constipation [Reflux/Heartburn] : reflux/heartburn [Abdominal Pain] : no abdominal pain [Vomiting] : no vomiting [Diarrhea] : no diarrhea [Hernia] : no hernia [FreeTextEntry7] : mild abdominal soreness, occasional nausea after protein drinks, managed constipation

## 2024-07-25 NOTE — ASSESSMENT
[FreeTextEntry1] : 68-year-old woman 1 week s/p Single stage conversion from Lap-Band to Sleeve Gastrectomy presents for follow-up. Patient is doing well. Nutrition and exercise guidelines were reviewed with the patient.   Encourage zero calorie liquids (aim for 64 oz/day); once drinking 64 oz water/day can start drinking one small decaffeinated coffee per day (avoid liquid calories; limit liquid calories to 40 calories/day).  2 protein drinks/60 g protein per day; advance to soft food at 2 weeks post op (e.g. pureed black bean or lentil soup, Greek yogurt, cottage cheese, etc.) Begin vitamins after today's visit (2 in the AM, and 2 in the PM); bariatric vitamin list given to pt at today's visit; can switch to capsule vitamins at 4 weeks post op. No heavy lifting until 6 weeks post op. Increase cardio to 8,000 - 10,000 steps/day, track steps, and begin strength training at 6 weeks postop Follow-up with nutritionist Follow-up in 1 month with labs (ordered) Call with any questions or concerns   All questions answered  Pt seen with Dr Bean

## 2024-07-25 NOTE — HISTORY OF PRESENT ILLNESS
[Procedure: ___] : Procedure performed: [unfilled]  [Date of Surgery: ___] : Date of Surgery:   [unfilled] [Surgeon Name:   ___] : Surgeon Name: Dr. BUSCH [Pre-Op Weight ___] : Pre-op weight was [unfilled] lbs [de-identified] : 68-year-old woman 1 week s/p Single stage conversion from Lap-Band to Sleeve Gastrectomy presents for follow-up. Patient is doing well. Reports some nausea after protein drinks, occasional reflux, mild incisional soreness, and managed constipation; reports no leg cramps, fever/chills/sweats, vomiting, diarrhea. Patient trying to consume 2 protein shakes per day, trying to drink adequate zero-calorie liquids per day (reports clear urine), will start bariatric vitamins after today's visit, and is ambulating for exercise.   Opioids Used (Outpatient): total oxycodone taken by POD#7: 0 Morphine MSO4 equivalent: total at POD#7 MME: 0 Outpatient Pain Score at initial postop visit POD#7: pt reports pain level of 0, on pain scale of 0-10 Unused Opioids Returned: Discussed with the pt that unused opioids can be returned to the pt's local pharmacy Additional Opioids Provided: 0  Next nutritionist appointment is 9/11/24 [de-identified] : Lap-Band APS with plication, 7/29/13, Dr. Kothari

## 2024-07-25 NOTE — PHYSICAL EXAM
[Normal] : supple without JVD, no thyromegaly or masses appreciated [de-identified] : Equal chest rise, non-labored respirations, no audible wheezing.  [de-identified] : Incisions healing appropriately without erythema or drainage, soft, NT, ND, no evidence of hernia, Steri-Strips removed

## 2024-08-22 ENCOUNTER — APPOINTMENT (OUTPATIENT)
Dept: BARIATRICS | Facility: CLINIC | Age: 69
End: 2024-08-22

## 2024-08-22 VITALS — BODY MASS INDEX: 35.99 KG/M2 | HEIGHT: 64 IN | WEIGHT: 210.8 LBS

## 2024-08-22 PROCEDURE — 99024 POSTOP FOLLOW-UP VISIT: CPT

## 2024-08-22 RX ORDER — LORATADINE 5 MG
17 TABLET,CHEWABLE ORAL
Refills: 0 | Status: ACTIVE | COMMUNITY

## 2024-09-03 PROBLEM — Z87.19 HISTORY OF GASTROESOPHAGEAL REFLUX (GERD): Status: RESOLVED | Noted: 2023-04-20 | Resolved: 2024-09-03

## 2024-09-11 ENCOUNTER — APPOINTMENT (OUTPATIENT)
Dept: BARIATRICS/WEIGHT MGMT | Facility: CLINIC | Age: 69
End: 2024-09-11
Payer: MEDICARE

## 2024-09-11 ENCOUNTER — TRANSCRIPTION ENCOUNTER (OUTPATIENT)
Age: 69
End: 2024-09-11

## 2024-09-11 DIAGNOSIS — Z98.84 BARIATRIC SURGERY STATUS: ICD-10-CM

## 2024-09-11 DIAGNOSIS — R73.03 PREDIABETES.: ICD-10-CM

## 2024-09-11 PROCEDURE — 97803 MED NUTRITION INDIV SUBSEQ: CPT | Mod: 2W

## 2024-09-12 VITALS — HEIGHT: 64 IN | BODY MASS INDEX: 35.51 KG/M2 | WEIGHT: 208 LBS

## 2024-09-13 PROBLEM — E66.01 MORBID (SEVERE) OBESITY DUE TO EXCESS CALORIES: Chronic | Status: ACTIVE | Noted: 2024-07-01

## 2024-09-13 PROBLEM — G47.33 OBSTRUCTIVE SLEEP APNEA (ADULT) (PEDIATRIC): Chronic | Status: ACTIVE | Noted: 2024-07-01

## 2024-09-13 PROBLEM — I48.91 UNSPECIFIED ATRIAL FIBRILLATION: Chronic | Status: ACTIVE | Noted: 2024-07-01

## 2024-09-30 ENCOUNTER — NON-APPOINTMENT (OUTPATIENT)
Age: 69
End: 2024-09-30

## 2024-10-01 ENCOUNTER — TRANSCRIPTION ENCOUNTER (OUTPATIENT)
Age: 69
End: 2024-10-01

## 2024-10-09 ENCOUNTER — APPOINTMENT (OUTPATIENT)
Dept: MRI IMAGING | Facility: IMAGING CENTER | Age: 69
End: 2024-10-09
Payer: MEDICARE

## 2024-10-09 ENCOUNTER — OUTPATIENT (OUTPATIENT)
Dept: OUTPATIENT SERVICES | Facility: HOSPITAL | Age: 69
LOS: 1 days | End: 2024-10-09
Payer: MEDICARE

## 2024-10-09 ENCOUNTER — APPOINTMENT (OUTPATIENT)
Dept: NEUROSURGERY | Facility: CLINIC | Age: 69
End: 2024-10-09
Payer: MEDICARE

## 2024-10-09 VITALS
HEIGHT: 64 IN | HEART RATE: 60 BPM | DIASTOLIC BLOOD PRESSURE: 91 MMHG | WEIGHT: 204 LBS | SYSTOLIC BLOOD PRESSURE: 135 MMHG | RESPIRATION RATE: 16 BRPM | OXYGEN SATURATION: 97 % | BODY MASS INDEX: 34.83 KG/M2

## 2024-10-09 DIAGNOSIS — Z98.890 OTHER SPECIFIED POSTPROCEDURAL STATES: Chronic | ICD-10-CM

## 2024-10-09 DIAGNOSIS — Z98.84 BARIATRIC SURGERY STATUS: Chronic | ICD-10-CM

## 2024-10-09 DIAGNOSIS — D32.9 BENIGN NEOPLASM OF MENINGES, UNSPECIFIED: ICD-10-CM

## 2024-10-09 PROCEDURE — 99214 OFFICE O/P EST MOD 30 MIN: CPT

## 2024-10-09 PROCEDURE — 70551 MRI BRAIN STEM W/O DYE: CPT | Mod: 26,MH

## 2024-10-14 ENCOUNTER — RX RENEWAL (OUTPATIENT)
Age: 69
End: 2024-10-14

## 2024-10-23 ENCOUNTER — APPOINTMENT (OUTPATIENT)
Dept: BARIATRICS/WEIGHT MGMT | Facility: CLINIC | Age: 69
End: 2024-10-23

## 2024-10-24 ENCOUNTER — APPOINTMENT (OUTPATIENT)
Dept: BARIATRICS | Facility: CLINIC | Age: 69
End: 2024-10-24
Payer: MEDICARE

## 2024-10-24 VITALS
DIASTOLIC BLOOD PRESSURE: 70 MMHG | WEIGHT: 201 LBS | TEMPERATURE: 97.2 F | SYSTOLIC BLOOD PRESSURE: 110 MMHG | BODY MASS INDEX: 34.31 KG/M2 | HEART RATE: 100 BPM | OXYGEN SATURATION: 97 % | HEIGHT: 64 IN

## 2024-10-24 DIAGNOSIS — Z90.3 POSTSURGICAL MALABSORPTION, NOT ELSEWHERE CLASSIFIED: ICD-10-CM

## 2024-10-24 DIAGNOSIS — Z98.84 BARIATRIC SURGERY STATUS: ICD-10-CM

## 2024-10-24 DIAGNOSIS — E56.9 VITAMIN DEFICIENCY, UNSPECIFIED: ICD-10-CM

## 2024-10-24 DIAGNOSIS — E66.812 OBESITY, CLASS 2: ICD-10-CM

## 2024-10-24 DIAGNOSIS — K21.9 GASTRO-ESOPHAGEAL REFLUX DISEASE W/OUT ESOPHAGITIS: ICD-10-CM

## 2024-10-24 DIAGNOSIS — G47.33 OBSTRUCTIVE SLEEP APNEA (ADULT) (PEDIATRIC): ICD-10-CM

## 2024-10-24 DIAGNOSIS — I10 ESSENTIAL (PRIMARY) HYPERTENSION: ICD-10-CM

## 2024-10-24 DIAGNOSIS — K91.2 POSTSURGICAL MALABSORPTION, NOT ELSEWHERE CLASSIFIED: ICD-10-CM

## 2024-10-24 DIAGNOSIS — R63.4 ABNORMAL WEIGHT LOSS: ICD-10-CM

## 2024-10-24 PROCEDURE — 99214 OFFICE O/P EST MOD 30 MIN: CPT

## 2024-10-24 RX ORDER — OMEPRAZOLE 20 MG/1
20 CAPSULE, DELAYED RELEASE ORAL
Qty: 30 | Refills: 3 | Status: ACTIVE | COMMUNITY
Start: 2024-10-24 | End: 1900-01-01

## 2024-11-20 PROCEDURE — 70551 MRI BRAIN STEM W/O DYE: CPT

## 2024-12-24 PROBLEM — F10.90 ALCOHOL USE: Status: ACTIVE | Noted: 2019-08-22

## 2025-01-02 ENCOUNTER — APPOINTMENT (OUTPATIENT)
Dept: BARIATRICS | Facility: CLINIC | Age: 70
End: 2025-01-02

## 2025-01-09 ENCOUNTER — RX RENEWAL (OUTPATIENT)
Age: 70
End: 2025-01-09

## 2025-01-16 ENCOUNTER — NON-APPOINTMENT (OUTPATIENT)
Age: 70
End: 2025-01-16

## 2025-01-16 ENCOUNTER — APPOINTMENT (OUTPATIENT)
Dept: BARIATRICS | Facility: CLINIC | Age: 70
End: 2025-01-16
Payer: MEDICARE

## 2025-01-16 VITALS
DIASTOLIC BLOOD PRESSURE: 92 MMHG | OXYGEN SATURATION: 98 % | HEIGHT: 64 IN | BODY MASS INDEX: 33.84 KG/M2 | TEMPERATURE: 97.3 F | WEIGHT: 198.19 LBS | HEART RATE: 70 BPM | SYSTOLIC BLOOD PRESSURE: 135 MMHG

## 2025-01-16 DIAGNOSIS — E66.812 OBESITY, CLASS 2: ICD-10-CM

## 2025-01-16 DIAGNOSIS — R63.4 ABNORMAL WEIGHT LOSS: ICD-10-CM

## 2025-01-16 DIAGNOSIS — I10 ESSENTIAL (PRIMARY) HYPERTENSION: ICD-10-CM

## 2025-01-16 DIAGNOSIS — R14.2 ERUCTATION: ICD-10-CM

## 2025-01-16 DIAGNOSIS — Z98.84 BARIATRIC SURGERY STATUS: ICD-10-CM

## 2025-01-16 PROCEDURE — 99214 OFFICE O/P EST MOD 30 MIN: CPT

## 2025-02-06 ENCOUNTER — RX RENEWAL (OUTPATIENT)
Age: 70
End: 2025-02-06

## 2025-02-27 ENCOUNTER — RX RENEWAL (OUTPATIENT)
Age: 70
End: 2025-02-27

## 2025-03-05 ENCOUNTER — RX RENEWAL (OUTPATIENT)
Age: 70
End: 2025-03-05

## 2025-03-20 NOTE — DISCHARGE NOTE PROVIDER - YES NO FOR MLM POSITIVE OR NEGATIVE COVID RESULT
Detail Level: Detailed
Quality 226: Preventive Care And Screening: Tobacco Use: Screening And Cessation Intervention: Patient screened for tobacco use and is an ex/non-smoker
Quality 47: Advance Care Plan: Advance Care Planning discussed and documented; advance care plan or surrogate decision maker documented in the medical record.
,

## 2025-03-24 ENCOUNTER — TRANSCRIPTION ENCOUNTER (OUTPATIENT)
Age: 70
End: 2025-03-24

## 2025-03-25 ENCOUNTER — APPOINTMENT (OUTPATIENT)
Dept: INTERNAL MEDICINE | Facility: CLINIC | Age: 70
End: 2025-03-25
Payer: MEDICARE

## 2025-03-25 VITALS
RESPIRATION RATE: 15 BRPM | HEIGHT: 64 IN | BODY MASS INDEX: 32.61 KG/M2 | WEIGHT: 191 LBS | HEART RATE: 90 BPM | TEMPERATURE: 98.6 F | OXYGEN SATURATION: 96 %

## 2025-03-25 VITALS — DIASTOLIC BLOOD PRESSURE: 72 MMHG | SYSTOLIC BLOOD PRESSURE: 122 MMHG | HEART RATE: 76 BPM

## 2025-03-25 DIAGNOSIS — F41.9 ANXIETY DISORDER, UNSPECIFIED: ICD-10-CM

## 2025-03-25 DIAGNOSIS — D32.9 BENIGN NEOPLASM OF MENINGES, UNSPECIFIED: ICD-10-CM

## 2025-03-25 DIAGNOSIS — R73.09 OTHER ABNORMAL GLUCOSE: ICD-10-CM

## 2025-03-25 DIAGNOSIS — I10 ESSENTIAL (PRIMARY) HYPERTENSION: ICD-10-CM

## 2025-03-25 DIAGNOSIS — Z00.00 ENCOUNTER FOR GENERAL ADULT MEDICAL EXAMINATION W/OUT ABNORMAL FINDINGS: ICD-10-CM

## 2025-03-25 DIAGNOSIS — E78.5 HYPERLIPIDEMIA, UNSPECIFIED: ICD-10-CM

## 2025-03-25 DIAGNOSIS — R73.03 PREDIABETES.: ICD-10-CM

## 2025-03-25 PROCEDURE — G0439: CPT

## 2025-03-25 PROCEDURE — G2211 COMPLEX E/M VISIT ADD ON: CPT

## 2025-03-25 PROCEDURE — 99213 OFFICE O/P EST LOW 20 MIN: CPT | Mod: 25

## 2025-03-26 LAB
25(OH)D3 SERPL-MCNC: 18.2 NG/ML
ALBUMIN SERPL ELPH-MCNC: 5 G/DL
ALP BLD-CCNC: 72 U/L
ALT SERPL-CCNC: 20 U/L
ANION GAP SERPL CALC-SCNC: 15 MMOL/L
APPEARANCE: ABNORMAL
AST SERPL-CCNC: 19 U/L
BACTERIA: NEGATIVE /HPF
BASOPHILS # BLD AUTO: 0.04 K/UL
BASOPHILS NFR BLD AUTO: 0.7 %
BILIRUB SERPL-MCNC: 0.6 MG/DL
BILIRUBIN URINE: NEGATIVE
BLOOD URINE: ABNORMAL
BUN SERPL-MCNC: 21 MG/DL
CALCIUM OXALATE CRYSTALS: PRESENT
CALCIUM SERPL-MCNC: 9.6 MG/DL
CAST: 3 /LPF
CHLORIDE SERPL-SCNC: 102 MMOL/L
CHOLEST SERPL-MCNC: 263 MG/DL
CO2 SERPL-SCNC: 25 MMOL/L
COLOR: YELLOW
CREAT SERPL-MCNC: 0.57 MG/DL
EGFRCR SERPLBLD CKD-EPI 2021: 100 ML/MIN/1.73M2
EOSINOPHIL # BLD AUTO: 0.12 K/UL
EOSINOPHIL NFR BLD AUTO: 2.1 %
EPITHELIAL CELLS: 6 /HPF
ESTIMATED AVERAGE GLUCOSE: 117 MG/DL
FERRITIN SERPL-MCNC: 640 NG/ML
FOLATE SERPL-MCNC: 3.8 NG/ML
GLUCOSE QUALITATIVE U: NEGATIVE MG/DL
GLUCOSE SERPL-MCNC: 98 MG/DL
HBA1C MFR BLD HPLC: 5.7 %
HCT VFR BLD CALC: 49.3 %
HDLC SERPL-MCNC: 81 MG/DL
HGB BLD-MCNC: 15.4 G/DL
IMM GRANULOCYTES NFR BLD AUTO: 0 %
IRON SATN MFR SERPL: 30 %
IRON SERPL-MCNC: 90 UG/DL
KETONES URINE: NEGATIVE MG/DL
LDLC SERPL-MCNC: 163 MG/DL
LEUKOCYTE ESTERASE URINE: NEGATIVE
LYMPHOCYTES # BLD AUTO: 2.2 K/UL
LYMPHOCYTES NFR BLD AUTO: 39.4 %
MAN DIFF?: NORMAL
MCHC RBC-ENTMCNC: 28.8 PG
MCHC RBC-ENTMCNC: 31.2 G/DL
MCV RBC AUTO: 92.3 FL
MICROSCOPIC-UA: NORMAL
MONOCYTES # BLD AUTO: 0.38 K/UL
MONOCYTES NFR BLD AUTO: 6.8 %
NEUTROPHILS # BLD AUTO: 2.85 K/UL
NEUTROPHILS NFR BLD AUTO: 51 %
NITRITE URINE: NEGATIVE
NONHDLC SERPL-MCNC: 182 MG/DL
PH URINE: 5
PLATELET # BLD AUTO: 248 K/UL
POTASSIUM SERPL-SCNC: 3.9 MMOL/L
PROT SERPL-MCNC: 7.4 G/DL
PROTEIN URINE: NORMAL MG/DL
RBC # BLD: 5.34 M/UL
RBC # FLD: 13.5 %
RED BLOOD CELLS URINE: NORMAL /HPF
REVIEW: NORMAL
SODIUM SERPL-SCNC: 143 MMOL/L
SPECIFIC GRAVITY URINE: 1.03
TIBC SERPL-MCNC: 303 UG/DL
TRIGL SERPL-MCNC: 113 MG/DL
TSH SERPL-ACNC: 1.68 UIU/ML
UIBC SERPL-MCNC: 213 UG/DL
UROBILINOGEN URINE: 0.2 MG/DL
VIT B12 SERPL-MCNC: 375 PG/ML
WBC # FLD AUTO: 5.59 K/UL
WHITE BLOOD CELLS URINE: 3 /HPF

## 2025-04-17 ENCOUNTER — APPOINTMENT (OUTPATIENT)
Dept: BARIATRICS | Facility: CLINIC | Age: 70
End: 2025-04-17
Payer: MEDICARE

## 2025-04-17 VITALS
HEIGHT: 64 IN | TEMPERATURE: 97.2 F | SYSTOLIC BLOOD PRESSURE: 140 MMHG | HEART RATE: 102 BPM | DIASTOLIC BLOOD PRESSURE: 80 MMHG | OXYGEN SATURATION: 95 % | BODY MASS INDEX: 33.39 KG/M2 | WEIGHT: 195.6 LBS

## 2025-04-17 DIAGNOSIS — E66.811 OBESITY, CLASS 1: ICD-10-CM

## 2025-04-17 DIAGNOSIS — Z98.84 BARIATRIC SURGERY STATUS: ICD-10-CM

## 2025-04-17 DIAGNOSIS — R11.0 NAUSEA: ICD-10-CM

## 2025-04-17 DIAGNOSIS — R63.4 ABNORMAL WEIGHT LOSS: ICD-10-CM

## 2025-04-17 DIAGNOSIS — Z90.3 POSTSURGICAL MALABSORPTION, NOT ELSEWHERE CLASSIFIED: ICD-10-CM

## 2025-04-17 DIAGNOSIS — K91.2 POSTSURGICAL MALABSORPTION, NOT ELSEWHERE CLASSIFIED: ICD-10-CM

## 2025-04-17 DIAGNOSIS — I10 ESSENTIAL (PRIMARY) HYPERTENSION: ICD-10-CM

## 2025-04-17 PROCEDURE — 99214 OFFICE O/P EST MOD 30 MIN: CPT

## 2025-04-17 RX ORDER — ONDANSETRON 4 MG/1
4 TABLET ORAL EVERY 8 HOURS
Qty: 60 | Refills: 0 | Status: ACTIVE | COMMUNITY
Start: 2025-04-17 | End: 1900-01-01

## 2025-04-29 ENCOUNTER — APPOINTMENT (OUTPATIENT)
Dept: BARIATRICS/WEIGHT MGMT | Facility: CLINIC | Age: 70
End: 2025-04-29

## 2025-05-17 NOTE — H&P PST ADULT - PRO INTERPRETER NEED 2
Patient: Darnell Grant    Procedure: Procedure(s):  Cystoscopy, Left Ureteroscopy, Left Retrograde Pyelogram, Left Laser Lithotripsy, Left Ureteral Stent Placement       Anesthesia Type:  General    Note:  Disposition: Outpatient   Postop Pain Control: Uneventful            Sign Out: Well controlled pain   PONV: No   Neuro/Psych: Uneventful            Sign Out: Acceptable/Baseline neuro status   Airway/Respiratory: Uneventful            Sign Out: Acceptable/Baseline resp. status   CV/Hemodynamics: Uneventful            Sign Out: Acceptable CV status; No obvious hypovolemia; No obvious fluid overload   Other NRE: NONE   DID A NON-ROUTINE EVENT OCCUR? No           Last vitals:  Vitals Value Taken Time   /71 05/16/25 15:25   Temp 97.4  F (36.3  C) 05/16/25 15:12   Pulse 73 05/16/25 15:26   Resp 14 05/16/25 15:12   SpO2 96 % 05/16/25 15:26   Vitals shown include unfiled device data.    Electronically Signed By: Joe Moody MD  May 17, 2025  6:25 AM   English

## 2025-06-24 ENCOUNTER — RX RENEWAL (OUTPATIENT)
Age: 70
End: 2025-06-24

## 2025-07-17 ENCOUNTER — APPOINTMENT (OUTPATIENT)
Dept: BARIATRICS | Facility: CLINIC | Age: 70
End: 2025-07-17

## 2025-07-31 ENCOUNTER — APPOINTMENT (OUTPATIENT)
Dept: BARIATRICS | Facility: CLINIC | Age: 70
End: 2025-07-31
Payer: MEDICARE

## 2025-07-31 VITALS
TEMPERATURE: 97.5 F | HEART RATE: 60 BPM | BODY MASS INDEX: 33.27 KG/M2 | OXYGEN SATURATION: 99 % | HEIGHT: 64 IN | SYSTOLIC BLOOD PRESSURE: 134 MMHG | DIASTOLIC BLOOD PRESSURE: 88 MMHG | WEIGHT: 194.89 LBS

## 2025-07-31 DIAGNOSIS — Z98.84 BARIATRIC SURGERY STATUS: ICD-10-CM

## 2025-07-31 DIAGNOSIS — R14.2 ERUCTATION: ICD-10-CM

## 2025-07-31 DIAGNOSIS — Z00.00 ENCOUNTER FOR GENERAL ADULT MEDICAL EXAMINATION W/OUT ABNORMAL FINDINGS: ICD-10-CM

## 2025-07-31 DIAGNOSIS — K21.9 GASTRO-ESOPHAGEAL REFLUX DISEASE W/OUT ESOPHAGITIS: ICD-10-CM

## 2025-07-31 DIAGNOSIS — E66.811 OBESITY, CLASS 1: ICD-10-CM

## 2025-07-31 DIAGNOSIS — R11.0 NAUSEA: ICD-10-CM

## 2025-07-31 PROCEDURE — 99214 OFFICE O/P EST MOD 30 MIN: CPT

## (undated) DEVICE — FOLEY TRAY 16FR LF URINE METER SURESTEP

## (undated) DEVICE — SUT ETHILON 3-0 18" FS-1

## (undated) DEVICE — BIOPSY FORCEP COLD DISP

## (undated) DEVICE — GLV 7 PROTEXIS (WHITE)

## (undated) DEVICE — SUCTION YANKAUER NO CONTROL VENT

## (undated) DEVICE — TROCAR ETHICON ENDOPATH XCEL BLADELESS 5MM X 100MM STABILITY

## (undated) DEVICE — CATH ELECHMSTAT  INJ 7FR 210CM

## (undated) DEVICE — DRSG BANDAID 0.75X3"

## (undated) DEVICE — PACK IV START WITH CHG

## (undated) DEVICE — AESCULAP SCALPFIX 10 CLIPS

## (undated) DEVICE — SPECIMEN CONTAINER 100ML

## (undated) DEVICE — ELCTR BOVIE TIP BLADE INSULATED 2.75" EDGE

## (undated) DEVICE — SALIVA EJECTOR (BLUE)

## (undated) DEVICE — TUBING STRYKEFLOW II SUCTION / IRRIGATOR

## (undated) DEVICE — MIDAS REX MR7 LUBRICANT DIFFUSER CARTRIDGE

## (undated) DEVICE — ELCTR SUBDERMAL NDL CLASSIC 1.5M X 59" (6 COLOR)

## (undated) DEVICE — DENTURE CUP PINK

## (undated) DEVICE — NDL HYPO SAFE 22G X 1.5" (BLACK)

## (undated) DEVICE — TROCAR ETHICON ENDOPATH XCEL BLADELESS 15MM X 100MM STABILITY

## (undated) DEVICE — BLADE SCALPEL SAFETYLOCK #15

## (undated) DEVICE — TUBING STRYKER PNEUMOSURE HI FLOW RTP

## (undated) DEVICE — ENDOCATCH II 15MM

## (undated) DEVICE — SOL IRR POUR H2O 250ML

## (undated) DEVICE — SHEARS COVIDIEN ENDO SHEAR 5MM X 45CM LONG W MONOPOLAR CAUTERY

## (undated) DEVICE — SUT MAXON 4-0 30" P-12

## (undated) DEVICE — SUT ENDOSTITCH DEVICE 10MM

## (undated) DEVICE — SUT SOFSILK 0 30" V-20

## (undated) DEVICE — SMOKE EVACUTATION SYS LAPROSCOPIC AC/PA

## (undated) DEVICE — GLV 7.5 PROTEXIS (WHITE)

## (undated) DEVICE — MARKING PEN W RULER

## (undated) DEVICE — LIGASURE L-HOOK 44CM

## (undated) DEVICE — RUBBER BANDS STERILE

## (undated) DEVICE — POSITIONER FOAM EGG CRATE ULNAR 2PCS (PINK)

## (undated) DEVICE — MEDICATION LABELS W MARKER

## (undated) DEVICE — D HELP - CLEARVIEW CLEARIFY SYSTEM

## (undated) DEVICE — DRAPE CAMERA VIDEO 7"X96"

## (undated) DEVICE — POSITIONER FOAM HEADREST (PINK)

## (undated) DEVICE — DRAPE TOWEL BLUE 17" X 24"

## (undated) DEVICE — TUBING BIPOLAR IRRIGATOR AND CORD SET

## (undated) DEVICE — DRAPE 3/4 SHEET 52X76"

## (undated) DEVICE — PREP BETADINE KIT

## (undated) DEVICE — Device

## (undated) DEVICE — DRAIN JACKSON PRATT 3 SPRING RESERVOIR W 10FR PVC DRAIN

## (undated) DEVICE — FOLEY TRAY 14FR 5CC LF BAG CLOSED

## (undated) DEVICE — ELCTR SUBDERMAL CORKSCREW NDL 1.2MM

## (undated) DEVICE — WOUND IRR SURGIPHOR

## (undated) DEVICE — CONTAINER FORMALIN 80ML YELLOW

## (undated) DEVICE — SOL IRR POUR NS 0.9% 1000ML

## (undated) DEVICE — VENODYNE/SCD SLEEVE CALF MEDIUM

## (undated) DEVICE — DRAPE 1/2 SHEET 40X57"

## (undated) DEVICE — DRSG OPSITE 13.75 X 4"

## (undated) DEVICE — WARMING BLANKET FULL UNDERBODY

## (undated) DEVICE — BASIN EMESIS 10IN GRADUATED MAUVE

## (undated) DEVICE — TROCAR ETHICON ENDOPATH XCEL BLADELESS 12MM X 100MM STABILITY

## (undated) DEVICE — BITE BLOCK ADULT 20 X 27MM (GREEN)

## (undated) DEVICE — CODMAN PERFORATOR 14MM (BLUE)

## (undated) DEVICE — VENODYNE/SCD SLEEVE CALF LARGE

## (undated) DEVICE — ELCTR 4-DISC 20MM 49" (RED, BLUE, GREEN, BLACK)

## (undated) DEVICE — DRSG 2X2

## (undated) DEVICE — INSUFFLATION NDL COVIDIEN SURGINEEDLE VERESS 120MM

## (undated) DEVICE — MIDAS REX MR8 TAPERED SM BORE 2.3MM X 7CM FOOTED

## (undated) DEVICE — SOL IRR POUR H2O 1000ML

## (undated) DEVICE — ELCTR SUBDERMAL NDL 27G X 1/2" WITH TWISTED PAIR

## (undated) DEVICE — PROBE FIAPC DIA 2.3MM/7FR LNTH 220CM/7.2FT

## (undated) DEVICE — TUBING MEDI-VAC W MAXIGRIP CONNECTORS 1/4"X6'

## (undated) DEVICE — STRYKER SONOPET IQ TIP 12CM STANDARD

## (undated) DEVICE — DRAPE 3/4 SHEET W REINFORCEMENT 56X77"

## (undated) DEVICE — DRAPE MAYO STAND 30"

## (undated) DEVICE — DRSG TELFA .5 X 3

## (undated) DEVICE — DRSG MASTISOL

## (undated) DEVICE — TROCAR ETHICON ENDOPATH XCEL UNIVERSAL SLEEVE WITH OPTIVIEW 5MM X 100MM

## (undated) DEVICE — SUT VICRYL 3-0 18" CP-2 UNDYED (POP-OFF)

## (undated) DEVICE — GLV 8 PROTEXIS (WHITE)

## (undated) DEVICE — PACK GENERAL LAPAROSCOPY

## (undated) DEVICE — DRAPE SURGICAL #1010

## (undated) DEVICE — STAPLER COVIDIEN ENDO GIA XL HANDLE

## (undated) DEVICE — DRSG TELFA .25 X 3

## (undated) DEVICE — SOL INJ NS 0.9% 1000ML

## (undated) DEVICE — DRSG XEROFORM 1 X 8"

## (undated) DEVICE — SUT ENDOSTITCH SOFSILK 0 48" ES-9

## (undated) DEVICE — SYR LUER LOK 10CC

## (undated) DEVICE — DRSG CURITY GAUZE SPONGE 4 X 4" 12-PLY NON-STERILE

## (undated) DEVICE — SUT POLYSORB 3-0 30" V-20 UNDYED

## (undated) DEVICE — GOWN LG

## (undated) DEVICE — CATH IV SAFE BC 22G X 1" (BLUE)

## (undated) DEVICE — TROCAR COVIDIEN ENDO CLOSE SUTURING DEVICE

## (undated) DEVICE — SOL IRR POUR NS 0.9% 500ML

## (undated) DEVICE — ELCTR STRYKER NEPTUNE SMOKE EVACUATION PENCIL (GREEN)

## (undated) DEVICE — UNDERPAD LINEN SAVER 17 X 24"

## (undated) DEVICE — CLAMP BX HOT RAD JAW 3

## (undated) DEVICE — ELCTR ECG CONDUCTIVE ADHESIVE

## (undated) DEVICE — LINE BREATHE SAMPLNG

## (undated) DEVICE — LUBRICATING JELLY HR ONE SHOT 3G

## (undated) DEVICE — SUT SOFSILK 4-0 18" CV-23

## (undated) DEVICE — SUT NUROLON 4-0 8-18" TF (POP-OFF)

## (undated) DEVICE — BIOPSY FORCEP RADIAL JAW 4 STANDARD WITH NEEDLE

## (undated) DEVICE — TUBING IV SET GRAVITY 3Y 100" MACRO

## (undated) DEVICE — DRSG CURITY GAUZE SPONGE 4 X 4" 12-PLY

## (undated) DEVICE — WARMING BLANKET FULL ADULT

## (undated) DEVICE — TUBING SUCTION 20FT

## (undated) DEVICE — SUT POLYSORB 0 30" GU-46

## (undated) DEVICE — SHEARS COVIDIEN ENDO SHEAR 5MM X 31CM W UNIPOLAR CAUTERY

## (undated) DEVICE — SUT VICRYL 2-0 18" CP-2 UNDYED (POP-OFF)